# Patient Record
Sex: MALE | Race: WHITE | Employment: OTHER | URBAN - METROPOLITAN AREA
[De-identification: names, ages, dates, MRNs, and addresses within clinical notes are randomized per-mention and may not be internally consistent; named-entity substitution may affect disease eponyms.]

---

## 2017-05-12 ENCOUNTER — HOSPITAL ENCOUNTER (OUTPATIENT)
Dept: OTHER | Age: 77
Discharge: HOME | End: 2017-05-12
Attending: INTERNAL MEDICINE | Admitting: INTERNAL MEDICINE

## 2017-05-12 LAB
ANION GAP SERPL CALCULATED.3IONS-SCNC: 10 MMOL/L (ref 3–16)
BUN BLDV-MCNC: 28 MG/DL (ref 7–20)
CALCIUM SERPL-MCNC: 9.1 MG/DL (ref 8.3–10.6)
CHLORIDE BLD-SCNC: 104 MMOL/L (ref 99–110)
CHOLESTEROL, TOTAL: 94 MG/DL (ref 0–199)
CO2: 26 MMOL/L (ref 21–32)
CREAT SERPL-MCNC: 1 MG/DL (ref 0.8–1.3)
GFR AFRICAN AMERICAN: >60
GFR NON-AFRICAN AMERICAN: >60
GLUCOSE BLD-MCNC: 85 MG/DL (ref 70–99)
HDLC SERPL-MCNC: 46 MG/DL (ref 40–60)
LDL CHOLESTEROL CALCULATED: 32 MG/DL
POTASSIUM SERPL-SCNC: 4.2 MMOL/L (ref 3.5–5.1)
SODIUM BLD-SCNC: 140 MMOL/L (ref 136–145)
TRIGL SERPL-MCNC: 78 MG/DL (ref 0–150)
VLDLC SERPL CALC-MCNC: 16 MG/DL

## 2017-12-06 PROBLEM — J10.1 INFLUENZA A: Status: ACTIVE | Noted: 2017-12-06

## 2017-12-06 PROBLEM — J02.9 SORE THROAT: Status: ACTIVE | Noted: 2017-12-06

## 2017-12-06 PROBLEM — R55 SYNCOPE AND COLLAPSE: Status: ACTIVE | Noted: 2017-12-06

## 2017-12-06 PROBLEM — R00.0 TACHYCARDIA: Status: ACTIVE | Noted: 2017-12-06

## 2017-12-07 PROBLEM — A41.9 SEPSIS (HCC): Status: RESOLVED | Noted: 2017-12-06 | Resolved: 2017-12-07

## 2017-12-07 PROBLEM — R55 SYNCOPE AND COLLAPSE: Status: RESOLVED | Noted: 2017-12-06 | Resolved: 2017-12-07

## 2017-12-07 PROBLEM — R00.0 TACHYCARDIA: Status: RESOLVED | Noted: 2017-12-06 | Resolved: 2017-12-07

## 2018-01-15 PROBLEM — K85.10 ACUTE GALLSTONE PANCREATITIS: Status: ACTIVE | Noted: 2018-01-15

## 2018-01-15 PROBLEM — K83.09 ASCENDING CHOLANGITIS: Status: ACTIVE | Noted: 2018-01-15

## 2018-01-15 PROBLEM — J10.1 INFLUENZA A: Status: RESOLVED | Noted: 2017-12-06 | Resolved: 2018-01-15

## 2018-01-19 ENCOUNTER — TELEPHONE (OUTPATIENT)
Dept: SURGERY | Age: 78
End: 2018-01-19

## 2018-01-19 DIAGNOSIS — R11.0 NAUSEA: Primary | ICD-10-CM

## 2018-01-19 RX ORDER — ONDANSETRON 4 MG/1
4 TABLET, FILM COATED ORAL EVERY 4 HOURS PRN
Qty: 15 TABLET | Refills: 0 | Status: SHIPPED | OUTPATIENT
Start: 2018-01-19

## 2018-01-23 ENCOUNTER — OFFICE VISIT (OUTPATIENT)
Dept: SURGERY | Age: 78
End: 2018-01-23

## 2018-01-23 VITALS — WEIGHT: 176 LBS | DIASTOLIC BLOOD PRESSURE: 80 MMHG | SYSTOLIC BLOOD PRESSURE: 120 MMHG | BODY MASS INDEX: 24.55 KG/M2

## 2018-01-23 DIAGNOSIS — K85.90 ACUTE PANCREATITIS, UNSPECIFIED COMPLICATION STATUS, UNSPECIFIED PANCREATITIS TYPE: ICD-10-CM

## 2018-01-23 DIAGNOSIS — K85.10 ACUTE GALLSTONE PANCREATITIS: ICD-10-CM

## 2018-01-23 DIAGNOSIS — K85.90 ACUTE PANCREATITIS, UNSPECIFIED COMPLICATION STATUS, UNSPECIFIED PANCREATITIS TYPE: Primary | ICD-10-CM

## 2018-01-23 LAB
A/G RATIO: 1.1 (ref 1.1–2.2)
ALBUMIN SERPL-MCNC: 3.4 G/DL (ref 3.4–5)
ALP BLD-CCNC: 132 U/L (ref 40–129)
ALT SERPL-CCNC: 85 U/L (ref 10–40)
ANION GAP SERPL CALCULATED.3IONS-SCNC: 10 MMOL/L (ref 3–16)
AST SERPL-CCNC: 74 U/L (ref 15–37)
BASOPHILS ABSOLUTE: 0 K/UL (ref 0–0.2)
BASOPHILS RELATIVE PERCENT: 0.1 %
BILIRUB SERPL-MCNC: 1.4 MG/DL (ref 0–1)
BUN BLDV-MCNC: 28 MG/DL (ref 7–20)
CALCIUM SERPL-MCNC: 8.7 MG/DL (ref 8.3–10.6)
CHLORIDE BLD-SCNC: 104 MMOL/L (ref 99–110)
CO2: 26 MMOL/L (ref 21–32)
CREAT SERPL-MCNC: 1.1 MG/DL (ref 0.8–1.3)
EOSINOPHILS ABSOLUTE: 0.1 K/UL (ref 0–0.6)
EOSINOPHILS RELATIVE PERCENT: 0.7 %
GFR AFRICAN AMERICAN: >60
GFR NON-AFRICAN AMERICAN: >60
GLOBULIN: 3 G/DL
GLUCOSE BLD-MCNC: 111 MG/DL (ref 70–99)
HCT VFR BLD CALC: 43.3 % (ref 40.5–52.5)
HEMOGLOBIN: 14.5 G/DL (ref 13.5–17.5)
LIPASE: 218 U/L (ref 13–60)
LYMPHOCYTES ABSOLUTE: 1.3 K/UL (ref 1–5.1)
LYMPHOCYTES RELATIVE PERCENT: 16.5 %
MCH RBC QN AUTO: 31.6 PG (ref 26–34)
MCHC RBC AUTO-ENTMCNC: 33.5 G/DL (ref 31–36)
MCV RBC AUTO: 94.2 FL (ref 80–100)
MONOCYTES ABSOLUTE: 0.9 K/UL (ref 0–1.3)
MONOCYTES RELATIVE PERCENT: 11.1 %
NEUTROPHILS ABSOLUTE: 5.6 K/UL (ref 1.7–7.7)
NEUTROPHILS RELATIVE PERCENT: 71.6 %
PDW BLD-RTO: 14.1 % (ref 12.4–15.4)
PLATELET # BLD: 299 K/UL (ref 135–450)
PMV BLD AUTO: 8.2 FL (ref 5–10.5)
POTASSIUM SERPL-SCNC: 4.4 MMOL/L (ref 3.5–5.1)
RBC # BLD: 4.6 M/UL (ref 4.2–5.9)
SODIUM BLD-SCNC: 140 MMOL/L (ref 136–145)
TOTAL PROTEIN: 6.4 G/DL (ref 6.4–8.2)
WBC # BLD: 7.8 K/UL (ref 4–11)

## 2018-01-23 PROCEDURE — 99024 POSTOP FOLLOW-UP VISIT: CPT | Performed by: SURGERY

## 2018-01-23 NOTE — PROGRESS NOTES
[facility]                      PATIENT NAME: Grisel Ren     TODAY'S DATE: 1/23/2018    SUBJECTIVE:      Pt. returns to the office today following a laparoscopic cholecystectomy. He had surgery on 1/17 at [facility]. He has been recovering well to date, with progression towards normal activity and diet. He has some complaints today of some mid tenderness and nausea. OBJECTIVE:     VITALS:  /80   Wt 176 lb (79.8 kg)   BMI 24.55 kg/m²                                  CONSTITUTIONAL:  awake and alert  LUNGS:  clear to auscultation  ABDOMEN:   normal bowel sounds, soft, non-distended, non-tender   INCISIONS: clean, dry, no drainage, healing      Data:    Radiology Review:  Intraoperative cholangiogram was normal.      ASSESSMENT AND PLAN:    76 y.o. male status post LC, IOC. Pathology shows chronic cholecystitis and cholelithiasis. Liver had cholestasis from CBD stone. This was reviewed with the patient today. His recovery is progressing uneventfully, and he is released to normal activity.   Will check fu labs and CT to fu on pancreatitis, still having some panc sx's      Powell Couch

## 2018-01-30 ENCOUNTER — TELEPHONE (OUTPATIENT)
Dept: SURGERY | Age: 78
End: 2018-01-30

## 2018-01-30 NOTE — TELEPHONE ENCOUNTER
1/17 LAPAROSCOPIC CHOLECYSTECTOMY WITH CHOLANGIOGRAM/nausea and not eating much    Pt is calling stating he's lost about 15lbs total and he keeps losing. He's not in pain.      Thank You

## 2018-01-31 ENCOUNTER — HOSPITAL ENCOUNTER (OUTPATIENT)
Dept: CT IMAGING | Age: 78
Discharge: HOME | End: 2018-01-31
Attending: SURGERY | Admitting: SURGERY

## 2018-01-31 DIAGNOSIS — K85.90 ACUTE PANCREATITIS, UNSPECIFIED COMPLICATION STATUS, UNSPECIFIED PANCREATITIS TYPE: Primary | ICD-10-CM

## 2018-01-31 DIAGNOSIS — K85.90 ACUTE PANCREATITIS WITHOUT INFECTION OR NECROSIS: ICD-10-CM

## 2018-01-31 DIAGNOSIS — K85.90 ACUTE PANCREATITIS, UNSPECIFIED COMPLICATION STATUS, UNSPECIFIED PANCREATITIS TYPE: ICD-10-CM

## 2018-04-12 PROBLEM — J02.9 SORE THROAT: Status: RESOLVED | Noted: 2017-12-06 | Resolved: 2018-04-12

## 2018-07-12 ENCOUNTER — HOSPITAL ENCOUNTER (OUTPATIENT)
Dept: OTHER | Age: 78
Discharge: HOME | End: 2018-07-12
Attending: INTERNAL MEDICINE | Admitting: INTERNAL MEDICINE

## 2018-07-12 LAB
ALBUMIN SERPL-MCNC: 3.8 G/DL (ref 3.4–5)
ANION GAP SERPL CALCULATED.3IONS-SCNC: 12 MMOL/L (ref 3–16)
BUN BLDV-MCNC: 23 MG/DL (ref 7–20)
CALCIUM SERPL-MCNC: 8.8 MG/DL (ref 8.3–10.6)
CHLORIDE BLD-SCNC: 107 MMOL/L (ref 99–110)
CHOLESTEROL, TOTAL: 89 MG/DL (ref 0–199)
CO2: 24 MMOL/L (ref 21–32)
CREAT SERPL-MCNC: 1.2 MG/DL (ref 0.8–1.3)
GFR AFRICAN AMERICAN: >60
GFR NON-AFRICAN AMERICAN: 58
GLUCOSE BLD-MCNC: 91 MG/DL (ref 70–99)
HCT VFR BLD CALC: 45.9 % (ref 40.5–52.5)
HDLC SERPL-MCNC: 49 MG/DL (ref 40–60)
HEMOGLOBIN: 15.8 G/DL (ref 13.5–17.5)
LDL CHOLESTEROL CALCULATED: 24 MG/DL
MCH RBC QN AUTO: 31.4 PG (ref 26–34)
MCHC RBC AUTO-ENTMCNC: 34.3 G/DL (ref 31–36)
MCV RBC AUTO: 91.4 FL (ref 80–100)
PDW BLD-RTO: 13.9 % (ref 12.4–15.4)
PHOSPHORUS: 2.9 MG/DL (ref 2.5–4.9)
PLATELET # BLD: 164 K/UL (ref 135–450)
PMV BLD AUTO: 8.1 FL (ref 5–10.5)
POTASSIUM SERPL-SCNC: 4.2 MMOL/L (ref 3.5–5.1)
RBC # BLD: 5.02 M/UL (ref 4.2–5.9)
SODIUM BLD-SCNC: 143 MMOL/L (ref 136–145)
TRIGL SERPL-MCNC: 81 MG/DL (ref 0–150)
VLDLC SERPL CALC-MCNC: 16 MG/DL
WBC # BLD: 6.2 K/UL (ref 4–11)

## 2018-08-24 ENCOUNTER — HOSPITAL ENCOUNTER (OUTPATIENT)
Dept: OTHER | Age: 78
Discharge: HOME | End: 2018-08-24
Attending: INTERNAL MEDICINE | Admitting: INTERNAL MEDICINE

## 2018-08-24 LAB
ALBUMIN SERPL-MCNC: 4 G/DL (ref 3.4–5)
ANION GAP SERPL CALCULATED.3IONS-SCNC: 9 MMOL/L (ref 3–16)
BUN BLDV-MCNC: 26 MG/DL (ref 7–20)
CALCIUM SERPL-MCNC: 9.1 MG/DL (ref 8.3–10.6)
CHLORIDE BLD-SCNC: 108 MMOL/L (ref 99–110)
CO2: 27 MMOL/L (ref 21–32)
CREAT SERPL-MCNC: 1.2 MG/DL (ref 0.8–1.3)
GFR AFRICAN AMERICAN: >60
GFR NON-AFRICAN AMERICAN: 58
GLUCOSE BLD-MCNC: 130 MG/DL (ref 70–99)
PHOSPHORUS: 2.8 MG/DL (ref 2.5–4.9)
POTASSIUM SERPL-SCNC: 4.4 MMOL/L (ref 3.5–5.1)
SODIUM BLD-SCNC: 144 MMOL/L (ref 136–145)

## 2019-10-29 ENCOUNTER — HOSPITAL ENCOUNTER (OUTPATIENT)
Age: 79
Discharge: HOME | End: 2019-10-29
Payer: MEDICARE

## 2019-10-29 LAB
ALBUMIN SERPL-MCNC: 4.2 G/DL (ref 3.4–5)
ANION GAP SERPL CALCULATED.3IONS-SCNC: 11 MMOL/L (ref 3–16)
BUN BLDV-MCNC: 20 MG/DL (ref 7–20)
CALCIUM SERPL-MCNC: 9.1 MG/DL (ref 8.3–10.6)
CHLORIDE BLD-SCNC: 108 MMOL/L (ref 99–110)
CHOLESTEROL, TOTAL: 80 MG/DL (ref 0–199)
CO2: 23 MMOL/L (ref 21–32)
CREAT SERPL-MCNC: 1.3 MG/DL (ref 0.8–1.3)
GFR AFRICAN AMERICAN: >60
GFR NON-AFRICAN AMERICAN: 53
GLUCOSE BLD-MCNC: 95 MG/DL (ref 70–99)
HCT VFR BLD CALC: 46 % (ref 40.5–52.5)
HDLC SERPL-MCNC: 43 MG/DL (ref 40–60)
HEMOGLOBIN: 15.3 G/DL (ref 13.5–17.5)
LDL CHOLESTEROL CALCULATED: 22 MG/DL
MCH RBC QN AUTO: 31 PG (ref 26–34)
MCHC RBC AUTO-ENTMCNC: 33.2 G/DL (ref 31–36)
MCV RBC AUTO: 93.3 FL (ref 80–100)
PDW BLD-RTO: 13.5 % (ref 12.4–15.4)
PHOSPHORUS: 2.9 MG/DL (ref 2.5–4.9)
PLATELET # BLD: 178 K/UL (ref 135–450)
PMV BLD AUTO: 8.3 FL (ref 5–10.5)
POTASSIUM SERPL-SCNC: 4.6 MMOL/L (ref 3.5–5.1)
RBC # BLD: 4.93 M/UL (ref 4.2–5.9)
SODIUM BLD-SCNC: 142 MMOL/L (ref 136–145)
TRIGL SERPL-MCNC: 76 MG/DL (ref 0–150)
VLDLC SERPL CALC-MCNC: 15 MG/DL
WBC # BLD: 5.8 K/UL (ref 4–11)

## 2019-10-29 PROCEDURE — 80061 LIPID PANEL: CPT

## 2019-10-29 PROCEDURE — 36415 COLL VENOUS BLD VENIPUNCTURE: CPT

## 2019-10-29 PROCEDURE — 80069 RENAL FUNCTION PANEL: CPT

## 2019-10-29 PROCEDURE — 85027 COMPLETE CBC AUTOMATED: CPT

## 2020-10-01 ENCOUNTER — HOSPITAL ENCOUNTER (OUTPATIENT)
Age: 80
Discharge: HOME | End: 2020-10-01
Payer: MEDICARE

## 2020-10-01 LAB
ANION GAP SERPL CALCULATED.3IONS-SCNC: 10 MMOL/L (ref 3–16)
BUN BLDV-MCNC: 24 MG/DL (ref 7–20)
CALCIUM SERPL-MCNC: 9.2 MG/DL (ref 8.3–10.6)
CHLORIDE BLD-SCNC: 107 MMOL/L (ref 99–110)
CHOLESTEROL, TOTAL: 85 MG/DL (ref 0–199)
CO2: 25 MMOL/L (ref 21–32)
CREAT SERPL-MCNC: 1.2 MG/DL (ref 0.8–1.3)
GFR AFRICAN AMERICAN: >60
GFR NON-AFRICAN AMERICAN: 58
GLUCOSE BLD-MCNC: 101 MG/DL (ref 70–99)
HCT VFR BLD CALC: 47.6 % (ref 40.5–52.5)
HDLC SERPL-MCNC: 41 MG/DL (ref 40–60)
HEMOGLOBIN: 16.3 G/DL (ref 13.5–17.5)
LDL CHOLESTEROL CALCULATED: 25 MG/DL
MCH RBC QN AUTO: 31.3 PG (ref 26–34)
MCHC RBC AUTO-ENTMCNC: 34.3 G/DL (ref 31–36)
MCV RBC AUTO: 91 FL (ref 80–100)
PDW BLD-RTO: 13.4 % (ref 12.4–15.4)
PLATELET # BLD: 188 K/UL (ref 135–450)
PMV BLD AUTO: 7.9 FL (ref 5–10.5)
POTASSIUM SERPL-SCNC: 4.5 MMOL/L (ref 3.5–5.1)
RBC # BLD: 5.23 M/UL (ref 4.2–5.9)
SODIUM BLD-SCNC: 142 MMOL/L (ref 136–145)
TRIGL SERPL-MCNC: 94 MG/DL (ref 0–150)
VLDLC SERPL CALC-MCNC: 19 MG/DL
WBC # BLD: 6.1 K/UL (ref 4–11)

## 2020-10-01 PROCEDURE — 80061 LIPID PANEL: CPT

## 2020-10-01 PROCEDURE — 36415 COLL VENOUS BLD VENIPUNCTURE: CPT

## 2020-10-01 PROCEDURE — 85027 COMPLETE CBC AUTOMATED: CPT

## 2020-10-01 PROCEDURE — 80048 BASIC METABOLIC PNL TOTAL CA: CPT

## 2021-04-28 ENCOUNTER — APPOINTMENT (OUTPATIENT)
Dept: CT IMAGING | Age: 81
End: 2021-04-28
Payer: MEDICARE

## 2021-04-28 ENCOUNTER — APPOINTMENT (OUTPATIENT)
Dept: GENERAL RADIOLOGY | Age: 81
End: 2021-04-28
Payer: MEDICARE

## 2021-04-28 ENCOUNTER — HOSPITAL ENCOUNTER (EMERGENCY)
Age: 81
Discharge: HOME | End: 2021-04-28
Attending: EMERGENCY MEDICINE
Payer: MEDICARE

## 2021-04-28 VITALS
TEMPERATURE: 98.4 F | SYSTOLIC BLOOD PRESSURE: 134 MMHG | RESPIRATION RATE: 18 BRPM | HEART RATE: 66 BPM | BODY MASS INDEX: 23.62 KG/M2 | WEIGHT: 165 LBS | OXYGEN SATURATION: 99 % | DIASTOLIC BLOOD PRESSURE: 74 MMHG | HEIGHT: 70 IN

## 2021-04-28 DIAGNOSIS — K76.89 HEPATIC CYST: ICD-10-CM

## 2021-04-28 DIAGNOSIS — R10.13 ABDOMINAL PAIN, EPIGASTRIC: Primary | ICD-10-CM

## 2021-04-28 LAB
A/G RATIO: 1.1 (ref 1.1–2.2)
ALBUMIN SERPL-MCNC: 3.9 G/DL (ref 3.4–5)
ALP BLD-CCNC: 73 U/L (ref 40–129)
ALT SERPL-CCNC: 18 U/L (ref 10–40)
ANION GAP SERPL CALCULATED.3IONS-SCNC: 11 MMOL/L (ref 3–16)
AST SERPL-CCNC: 22 U/L (ref 15–37)
BASOPHILS ABSOLUTE: 0 K/UL (ref 0–0.2)
BASOPHILS RELATIVE PERCENT: 0.2 %
BILIRUB SERPL-MCNC: 0.5 MG/DL (ref 0–1)
BILIRUBIN URINE: ABNORMAL
BLOOD, URINE: NEGATIVE
BUN BLDV-MCNC: 23 MG/DL (ref 7–20)
CALCIUM SERPL-MCNC: 9.3 MG/DL (ref 8.3–10.6)
CHLORIDE BLD-SCNC: 110 MMOL/L (ref 99–110)
CLARITY: ABNORMAL
CO2: 21 MMOL/L (ref 21–32)
COLOR: YELLOW
CREAT SERPL-MCNC: 1.2 MG/DL (ref 0.8–1.3)
EOSINOPHILS ABSOLUTE: 0 K/UL (ref 0–0.6)
EOSINOPHILS RELATIVE PERCENT: 0.5 %
EPITHELIAL CELLS, UA: 1 /HPF (ref 0–5)
GFR AFRICAN AMERICAN: >60
GFR NON-AFRICAN AMERICAN: 58
GLOBULIN: 3.4 G/DL
GLUCOSE BLD-MCNC: 158 MG/DL (ref 70–99)
GLUCOSE URINE: 250 MG/DL
HCT VFR BLD CALC: 44.6 % (ref 40.5–52.5)
HEMOGLOBIN: 14.3 G/DL (ref 13.5–17.5)
HYALINE CASTS: 4 /LPF (ref 0–8)
KETONES, URINE: ABNORMAL MG/DL
LEUKOCYTE ESTERASE, URINE: ABNORMAL
LIPASE: 41 U/L (ref 13–60)
LYMPHOCYTES ABSOLUTE: 1.5 K/UL (ref 1–5.1)
LYMPHOCYTES RELATIVE PERCENT: 17.9 %
MCH RBC QN AUTO: 28.6 PG (ref 26–34)
MCHC RBC AUTO-ENTMCNC: 32.1 G/DL (ref 31–36)
MCV RBC AUTO: 89.1 FL (ref 80–100)
MICROSCOPIC EXAMINATION: YES
MONOCYTES ABSOLUTE: 0.9 K/UL (ref 0–1.3)
MONOCYTES RELATIVE PERCENT: 11.4 %
NEUTROPHILS ABSOLUTE: 5.8 K/UL (ref 1.7–7.7)
NEUTROPHILS RELATIVE PERCENT: 70 %
NITRITE, URINE: NEGATIVE
PDW BLD-RTO: 13.9 % (ref 12.4–15.4)
PH UA: 5.5 (ref 5–8)
PLATELET # BLD: 195 K/UL (ref 135–450)
PMV BLD AUTO: 7.6 FL (ref 5–10.5)
POTASSIUM SERPL-SCNC: 3.6 MMOL/L (ref 3.5–5.1)
PROTEIN UA: NEGATIVE MG/DL
RBC # BLD: 5.01 M/UL (ref 4.2–5.9)
RBC UA: 3 /HPF (ref 0–4)
SODIUM BLD-SCNC: 142 MMOL/L (ref 136–145)
SPECIFIC GRAVITY UA: 1.02 (ref 1–1.03)
TOTAL PROTEIN: 7.3 G/DL (ref 6.4–8.2)
TROPONIN: <0.01 NG/ML
URINE REFLEX TO CULTURE: ABNORMAL
URINE TYPE: ABNORMAL
UROBILINOGEN, URINE: 0.2 E.U./DL
WBC # BLD: 8.3 K/UL (ref 4–11)
WBC UA: 5 /HPF (ref 0–5)

## 2021-04-28 PROCEDURE — 74176 CT ABD & PELVIS W/O CONTRAST: CPT

## 2021-04-28 PROCEDURE — 99283 EMERGENCY DEPT VISIT LOW MDM: CPT

## 2021-04-28 PROCEDURE — 93005 ELECTROCARDIOGRAM TRACING: CPT | Performed by: EMERGENCY MEDICINE

## 2021-04-28 PROCEDURE — 85025 COMPLETE CBC W/AUTO DIFF WBC: CPT

## 2021-04-28 PROCEDURE — 6370000000 HC RX 637 (ALT 250 FOR IP): Performed by: PHYSICIAN ASSISTANT

## 2021-04-28 PROCEDURE — 81001 URINALYSIS AUTO W/SCOPE: CPT

## 2021-04-28 PROCEDURE — 83690 ASSAY OF LIPASE: CPT

## 2021-04-28 PROCEDURE — 84484 ASSAY OF TROPONIN QUANT: CPT

## 2021-04-28 PROCEDURE — 71045 X-RAY EXAM CHEST 1 VIEW: CPT

## 2021-04-28 PROCEDURE — 80053 COMPREHEN METABOLIC PANEL: CPT

## 2021-04-28 RX ORDER — LORATADINE 10 MG/1
10 TABLET ORAL PRN
COMMUNITY

## 2021-04-28 RX ORDER — FAMOTIDINE 20 MG/1
20 TABLET, FILM COATED ORAL ONCE
Status: COMPLETED | OUTPATIENT
Start: 2021-04-28 | End: 2021-04-28

## 2021-04-28 RX ADMIN — FAMOTIDINE 20 MG: 20 TABLET, FILM COATED ORAL at 15:33

## 2021-04-28 RX ADMIN — ALUMINUM HYDROXIDE, MAGNESIUM HYDROXIDE, AND SIMETHICONE: 200; 200; 20 SUSPENSION ORAL at 15:33

## 2021-04-28 ASSESSMENT — ENCOUNTER SYMPTOMS
WHEEZING: 0
RHINORRHEA: 0
DIARRHEA: 0
COUGH: 0
ABDOMINAL PAIN: 1
NAUSEA: 0
SHORTNESS OF BREATH: 0
VOMITING: 0

## 2021-04-28 ASSESSMENT — PAIN SCALES - GENERAL: PAINLEVEL_OUTOF10: 1

## 2021-04-28 NOTE — ED PROVIDER NOTES
[address] [facility]        Pt Name: Neville Dow  MRN: [id number]  [place] 1940  Date of evaluation: 4/28/2021  Provider: Dru Jacome PA-C  PCP: Vinayak Rhoades MD     I have seen and evaluated this patient with my supervising physician Dr. Jonathan Hanson. CHIEF COMPLAINT       Chief Complaint   Patient presents with    Abdominal Pain     pt with c/o abd pain began last night - no n/v/d       HISTORY OF PRESENT ILLNESS   (Location, Timing/Onset, Context/Setting, Quality, Duration, Modifying Factors, Severity, Associated Signs and Symptoms)  Note limiting factors. Neville Dow is a 78 y.o. male who presents for evaluation of epigastric and periumbilical abdominal pain that started last night while eating dinner. States he was eating spaghetti, hot dog and eating fruit at the time. States he had to leave dinner several times because he felt like he needed to pass gas or have a bowel movement but was not able to. States that he is feeling mostly better. Never had nausea or vomiting. No diarrhea or urinary complaints, however, states that he talked to his PCP who wanted him to come to the ER for cardiac evaluation. Patient denies any chest pain or shortness of breath. There is no other radiation of the pain. No dizziness/lightheadedness, weakness, visual disturbance or syncope. He does have history of reflux and takes Prilosec for this. He has no other complaints or concerns at this time. Nursing Notes were all reviewed and agreed with or any disagreements were addressed in the HPI. REVIEW OF SYSTEMS    (2-9 systems for level 4, 10 or more for level 5)     Review of Systems   Constitutional: Negative for appetite change, chills and fever. HENT: Negative for congestion and rhinorrhea. Respiratory: Negative for cough, shortness of breath and wheezing. Cardiovascular: Negative for chest pain.    Gastrointestinal: Positive for abdominal pain. Negative for diarrhea, nausea and vomiting. Genitourinary: Negative for difficulty urinating, dysuria and hematuria. Musculoskeletal: Negative for neck pain and neck stiffness. Skin: Negative for rash. Neurological: Negative for headaches. Positives and Pertinent negatives as per HPI. Except as noted above in the ROS, all other systems were reviewed and negative.        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    Esophageal reflux 9/6/2007    Essential hypertension 12/7/2007    Overview:  ICD-10 Transition    Glaucoma     left eye    Influenza 12/06/2017    Insomnia 9/24/2008    Other and unspecified hyperlipidemia 9/24/2008         SURGICAL HISTORY     Past Surgical History:   Procedure Laterality Date    CHOLECYSTECTOMY, LAPAROSCOPIC  01/17/2018    LAPAROSCOPIC CHOLECYSTECTOMY WITH CHOLANGIOGRAM    COLONOSCOPY      total of 3, first one polyps, 2nd and 3rd no polyps    [place] [facility]      Dr Lita Apley ERCP  01/16/2018    EYE SURGERY      bilateral cataracts removed, \"floaters\"    HERNIA REPAIR      bilateral inguinal hernia repairs         CURRENTMEDICATIONS       Discharge Medication List as of 4/28/2021  6:14 PM      CONTINUE these medications which have NOT CHANGED    Details   loratadine (CLARITIN) 10 MG tablet Take 10 mg by mouth as neededHistorical Med      pantoprazole (PROTONIX) 20 MG tablet Take 1 tablet by mouth daily, Disp-30 tablet, R-0Normal      aspirin 81 MG tablet Take 81 mg by mouth dailyHistorical Med      atenolol (TENORMIN) 25 MG tablet Take 50 mg by mouth daily Historical Med      ALPHAGAN P 0.1 % SOLN Place 1 drop into the left eye 3 times daily , DAWHistorical Med dorzolamide (TRUSOPT) 2 % ophthalmic solution Place 1 drop into the right eye 3 times daily Historical Med      lisinopril (PRINIVIL;ZESTRIL) 5 MG tablet Take 5 mg by mouth daily Historical Med      simvastatin (ZOCOR) 40 MG tablet Take 40 mg by mouth nightly Historical Med      zolpidem (AMBIEN) 5 MG tablet TAKE ONE TABLET BY MOUTH EVERY NIGHT AT BEDTIME AS NEEDEDHistorical Med      ondansetron (ZOFRAN) 4 MG tablet Take 1 tablet by mouth every 4 hours as needed for Nausea or Vomiting, Disp-15 tablet, R-0Normal      acetaminophen (TYLENOL) 325 MG tablet Take 2 tablets by mouth every 4 hours as needed for Pain or Fever, Disp-120 tablet, R-3Normal               ALLERGIES     Patient has no known allergies. FAMILYHISTORY       Family History   Problem Relation Age of Onset    Cancer Mother     Dementia Mother     Stroke Father           SOCIAL HISTORY       Social History     Tobacco Use    Smoking status: Former Smoker     Packs/day: 1.00     Years: 35.00     Pack years: 35.00     Types: Cigarettes     Quit date: 1988     Years since quittin.6    Smokeless tobacco: Never Used   Substance Use Topics    Alcohol use: Yes     Comment: beer occasionally    Drug use: No       SCREENINGS             PHYSICAL EXAM    (up to 7 for level 4, 8 or more for level 5)     ED Triage Vitals   BP Temp Temp src Pulse Resp SpO2 Height Weight   -- -- -- -- -- -- -- --       Physical Exam  Vitals signs and nursing note reviewed. Constitutional:       General: He is not in acute distress. Appearance: He is well-developed. He is not ill-appearing, toxic-appearing or diaphoretic. HENT:      Head: Normocephalic and atraumatic. Right Ear: External ear normal.      Left Ear: External ear normal.      Nose: Nose normal.   Eyes:      General:         Right eye: No discharge. Left eye: No discharge. Neck:      Musculoskeletal: Normal range of motion and neck supple.    Cardiovascular:      Rate and Rhythm: Normal rate and regular rhythm. Heart sounds: Normal heart sounds. Pulmonary:      Effort: Pulmonary effort is normal. No respiratory distress. Breath sounds: Normal breath sounds. Chest:      Chest wall: No tenderness. Abdominal:      General: Abdomen is flat. Bowel sounds are normal. There is no distension. Palpations: Abdomen is soft. There is no mass. Tenderness: There is no abdominal tenderness. There is no guarding or rebound. Hernia: No hernia is present. Musculoskeletal: Normal range of motion. Skin:     General: Skin is warm and dry. Neurological:      Mental Status: He is alert and oriented to person, place, and time.    Psychiatric:         Behavior: Behavior normal.         DIAGNOSTIC RESULTS   LABS:    Labs Reviewed   COMPREHENSIVE METABOLIC PANEL - Abnormal; Notable for the following components:       Result Value    Glucose 158 (*)     BUN 23 (*)     GFR Non- 58 (*)     All other components within normal limits    Narrative:     Performed at:  [facility] [address], [place]   Phone [phone]   URINE RT REFLEX TO CULTURE - Abnormal; Notable for the following components:    Clarity, UA CLOUDY (*)     Glucose, Ur 250 (*)     Bilirubin Urine SMALL (*)     Ketones, Urine TRACE (*)     Leukocyte Esterase, Urine TRACE (*)     All other components within normal limits    Narrative:     Performed at:  [facility] [address], [place]   Phone [phone]   CBC WITH AUTO DIFFERENTIAL    Narrative:     Performed at:  [facility] [address], [place]   Phone [phone]   LIPASE    Narrative:     Performed at:  [facility] [address],  [place], [place]   Phone [phone]   TROPONIN    Narrative:     Performed at:  [facility]  [address], [place]   Phone [phone]   MICROSCOPIC URINALYSIS    Narrative:     Performed at:  [facility]  [address], [place]   Phone [phone]       All other labs were within normal range or not returned as of this dictation. EKG: All EKG's are interpreted by the Emergency Department Physician in the absence of a cardiologist.  Please see their note for interpretation of EKG. RADIOLOGY:   Non-plain film images such as CT, Ultrasound and MRI are read by the radiologist. Plain radiographic images are visualized and preliminarily interpreted by the ED Provider with the below findings:        Interpretation per the Radiologist below, if available at the time of this note:    CT ABDOMEN PELVIS WO CONTRAST Additional Contrast? None   Final Result   1. Sliding hiatal hernia. 2. Mild emphysema and coronary artery calcifications noted at lung bases. 3. Hepatic cysts largest 3.5 cm in the right lobe. 4. No acute infective or inflammatory process. 5. No urinary tract calculi. XR CHEST PORTABLE   Final Result   No acute cardiac or pulmonary disease. No results found. PROCEDURES   Unless otherwise noted below, none     Procedures    CRITICAL CARE TIME   N/A    CONSULTS:  None      EMERGENCY DEPARTMENT COURSE and DIFFERENTIAL DIAGNOSIS/MDM:   Vitals:    Vitals:    04/28/21 1514   BP: 134/74   Pulse: 66   Resp: 18   Temp: 98.4 °F (36.9 °C)   TempSrc: Oral   SpO2: 99%   Weight: 165 lb (74.8 kg)   Height: 5' 10\" (1.778 m)       Patient was given the following medications:  Medications   aluminum & magnesium hydroxide-simethicone (MAALOX) 30 mL, lidocaine viscous hcl (XYLOCAINE) 5 mL (GI COCKTAIL) ( Oral Given 4/28/21 1533)   famotidine (PEPCID) tablet 20 mg (20 mg Oral Given 4/28/21 1533)           Patient presents for evaluation of epigastric abdominal pain.   On exam, he is resting comfortably in bed no acute distress nontoxic. Vitals are stable and he is afebrile. Lungs are clear to auscultation bilaterally, chest is nontender and abdomen is benign without focal reproducible tenderness or peritoneal signs. He is not distended or rigid. He was given GI cocktail and Pepcid for symptomatic relief and will be reevaluated. Please see attending note for EKG interpretation. CBC and CMP are unremarkable. Lipase 41. Troponin is negative. Urinalysis negative. Chest x-ray shows no acute process. CT abdomen pelvis shows mild emphysema and a sliding hiatal hernia. There is no infective or inflammatory process. Incidental finding of hepatic cyst in the right lobe. Patient improvement of symptoms on reevaluation. The patient has been evaluated and the history and physical exam suggest a benign etiology. I see nothing to suggest acute coronary syndrome, myocardial infarction, pulmonary embolism, thoracic aortic dissection, significant pericarditis, pneumonia, pneumothorax, or acute abdomen. I see nothing that would suggest an acute abdomen at this time. Based on history, physical exam, risk factors, and tests my suspicion for bowel obstruction, incarcerated hernia, acute pancreatitis, intra-abdominal abscess, perforated viscus, diverticulitis, cholecystitis, appendicitis, testicular torsion and cardiac ischemia is very low. There is no evidence of peritonitis, sepsis or toxicity at this time. I feel the patient can be managed as an outpatient with follow-up with his family doctor in 24-48 hours. Instructions have been given for the patient to return to the emergency department for worsening of the pain, high fevers, intractable vomiting, bleeding or any other concerns. He is agreeable to this plan and stable for discharge at this time. FINAL IMPRESSION      1. Abdominal pain, epigastric    2.  Hepatic cyst          DISPOSITION/PLAN   DISPOSITION Decision To Discharge 04/28/2021 06:05:06 PM      PATIENT REFERREDTO:  Alicja Corral MD  [address] [facility]    Call   For a re-check in  2-3  days    [facility] Emergency Department  [address]  Go to   If symptoms worsen      DISCHARGE MEDICATIONS:  Discharge Medication List as of 4/28/2021  6:14 PM          DISCONTINUED MEDICATIONS:  Discharge Medication List as of 4/28/2021  6:14 PM                 (Please note that portions of this note were completed with a voice recognition program.  Efforts were made to edit the dictations but occasionally words are mis-transcribed.)    Jordin Ignacio PA-C (electronically signed)           Lu Che [place]  04/28/21 [id number]

## 2021-04-28 NOTE — ED PROVIDER NOTES
This patient was seen by the Mid-Level Provider. I have seen and examined the patient, agree with the workup, evaluation, management and diagnosis. Care plan has been discussed. My assessment reveals an 64-year-old male who presents with abdominal pain. This is an 64-year-old male who presents with some abdominal pain he had last evening. The patient was apparently sent in by his primary care physician to make sure this was not cardiac related. He denies any chest pain or having any. Radiology results:    CT ABDOMEN PELVIS WO CONTRAST Additional Contrast? None   Final Result   1. Sliding hiatal hernia. 2. Mild emphysema and coronary artery calcifications noted at lung bases. 3. Hepatic cysts largest 3.5 cm in the right lobe. 4. No acute infective or inflammatory process. 5. No urinary tract calculi. XR CHEST PORTABLE   Final Result   No acute cardiac or pulmonary disease.                LABS:    Labs Reviewed   COMPREHENSIVE METABOLIC PANEL - Abnormal; Notable for the following components:       Result Value    Glucose 158 (*)     BUN 23 (*)     GFR Non- 58 (*)     All other components within normal limits    Narrative:     Performed at:  [facility] [address] [place]   Phone [phone]   URINE RT REFLEX TO CULTURE - Abnormal; Notable for the following components:    Clarity, UA CLOUDY (*)     Glucose, Ur 250 (*)     Bilirubin Urine SMALL (*)     Ketones, Urine TRACE (*)     Leukocyte Esterase, Urine TRACE (*)     All other components within normal limits    Narrative:     Performed at:  [facility] [address], [place]   Phone [phone]   CBC WITH AUTO DIFFERENTIAL    Narrative:     Performed at:  [facility] [address] [employer]   Phone [phone]   LIPASE    Narrative:     Performed at:  [facility]  [address]  [place], [place]   Phone [phone]   TROPONIN    Narrative:     Performed at:  [facility]  [address],  [place], [address]   Phone [phone]   MICROSCOPIC URINALYSIS    Narrative:     Performed at:  [facility]  [address]  [place], [address] [employer] [address]   Phone [phone]           EKG:    Sinus rhythm at a rate of 63 beats a minute with no acute ST elevations or depressions or pathologic Q waves. Normal axis. Exam:    Well-nourished male in no acute distress. He was sitting up in bed comfortably. He had no pain to palpation of his abdomen. Medical decision makin-year-old male presents with some abdominal pain last evening. The patient's work-up was unremarkable normal including a CT that shows some incidental findings and a cardiac work-up that was negative. The patient will be discharged with referral back to his primary physician and instructed to return if worse. FINAL IMPRESSION:    1.  Abdominal pain, epigastric           Yvette Kemp MD  [id number]

## 2021-04-29 LAB
EKG ATRIAL RATE: 63 BPM
EKG DIAGNOSIS: NORMAL
EKG P AXIS: 95 DEGREES
EKG P-R INTERVAL: 208 MS
EKG Q-T INTERVAL: 432 MS
EKG QRS DURATION: 80 MS
EKG QTC CALCULATION (BAZETT): 442 MS
EKG R AXIS: 63 DEGREES
EKG T AXIS: 47 DEGREES
EKG VENTRICULAR RATE: 63 BPM

## 2021-04-29 PROCEDURE — 93010 ELECTROCARDIOGRAM REPORT: CPT | Performed by: INTERNAL MEDICINE

## 2022-02-01 ENCOUNTER — APPOINTMENT (OUTPATIENT)
Dept: CT IMAGING | Age: 82
DRG: 378 | End: 2022-02-01
Payer: MEDICARE

## 2022-02-01 ENCOUNTER — ANESTHESIA EVENT (OUTPATIENT)
Dept: ENDOSCOPY | Age: 82
DRG: 378 | End: 2022-02-01
Payer: MEDICARE

## 2022-02-01 ENCOUNTER — APPOINTMENT (OUTPATIENT)
Dept: GENERAL RADIOLOGY | Age: 82
DRG: 378 | End: 2022-02-01
Payer: MEDICARE

## 2022-02-01 ENCOUNTER — HOSPITAL ENCOUNTER (INPATIENT)
Age: 82
LOS: 3 days | Discharge: HOME | DRG: 378 | End: 2022-02-04
Attending: INTERNAL MEDICINE | Admitting: INTERNAL MEDICINE
Payer: MEDICARE

## 2022-02-01 DIAGNOSIS — K76.9 LIVER LESION: ICD-10-CM

## 2022-02-01 DIAGNOSIS — R91.8 MULTIPLE LUNG NODULES ON CT: ICD-10-CM

## 2022-02-01 DIAGNOSIS — R59.0 MEDIASTINAL LYMPHADENOPATHY: ICD-10-CM

## 2022-02-01 DIAGNOSIS — R53.83 FATIGUE, UNSPECIFIED TYPE: ICD-10-CM

## 2022-02-01 DIAGNOSIS — R77.8 ELEVATED TROPONIN: ICD-10-CM

## 2022-02-01 DIAGNOSIS — D50.0 BLOOD LOSS ANEMIA: Primary | ICD-10-CM

## 2022-02-01 PROBLEM — K92.2 ACUTE GI BLEEDING: Status: ACTIVE | Noted: 2022-02-01

## 2022-02-01 LAB
ABO/RH: NORMAL
ALBUMIN SERPL-MCNC: 3.3 G/DL (ref 3.4–5)
ALP BLD-CCNC: 91 U/L (ref 40–129)
ALT SERPL-CCNC: 11 U/L (ref 10–40)
ANION GAP SERPL CALCULATED.3IONS-SCNC: 10 MMOL/L (ref 3–16)
ANTIBODY SCREEN: NORMAL
APTT: 28.2 SEC (ref 26.2–38.6)
AST SERPL-CCNC: 17 U/L (ref 15–37)
BASOPHILS ABSOLUTE: 0 K/UL (ref 0–0.2)
BASOPHILS RELATIVE PERCENT: 0.4 %
BILIRUB SERPL-MCNC: 0.6 MG/DL (ref 0–1)
BILIRUBIN DIRECT: 0.3 MG/DL (ref 0–0.3)
BILIRUBIN, INDIRECT: 0.3 MG/DL (ref 0–1)
BLOOD BANK DISPENSE STATUS: NORMAL
BLOOD BANK PRODUCT CODE: NORMAL
BPU ID: NORMAL
BUN BLDV-MCNC: 35 MG/DL (ref 7–20)
CALCIUM SERPL-MCNC: 8.2 MG/DL (ref 8.3–10.6)
CHLORIDE BLD-SCNC: 111 MMOL/L (ref 99–110)
CO2: 18 MMOL/L (ref 21–32)
CREAT SERPL-MCNC: 0.9 MG/DL (ref 0.8–1.3)
DESCRIPTION BLOOD BANK: NORMAL
EKG ATRIAL RATE: 72 BPM
EKG DIAGNOSIS: NORMAL
EKG P AXIS: 101 DEGREES
EKG P-R INTERVAL: 222 MS
EKG Q-T INTERVAL: 438 MS
EKG QRS DURATION: 78 MS
EKG QTC CALCULATION (BAZETT): 479 MS
EKG R AXIS: 47 DEGREES
EKG T AXIS: 12 DEGREES
EKG VENTRICULAR RATE: 72 BPM
EOSINOPHILS ABSOLUTE: 0.1 K/UL (ref 0–0.6)
EOSINOPHILS RELATIVE PERCENT: 2.2 %
FERRITIN: 29.2 NG/ML (ref 30–400)
FOLATE: 13.69 NG/ML (ref 4.78–24.2)
GFR AFRICAN AMERICAN: >60
GFR NON-AFRICAN AMERICAN: >60
GLUCOSE BLD-MCNC: 111 MG/DL (ref 70–99)
HCT VFR BLD CALC: 21.2 % (ref 40.5–52.5)
HCT VFR BLD CALC: 23.7 % (ref 40.5–52.5)
HEMOGLOBIN: 6.5 G/DL (ref 13.5–17.5)
HEMOGLOBIN: 7.3 G/DL (ref 13.5–17.5)
INR BLD: 1.16 (ref 0.88–1.12)
IRON SATURATION: 10 % (ref 20–50)
IRON: 27 UG/DL (ref 59–158)
LYMPHOCYTES ABSOLUTE: 0.5 K/UL (ref 1–5.1)
LYMPHOCYTES RELATIVE PERCENT: 10.4 %
MCH RBC QN AUTO: 27.1 PG (ref 26–34)
MCHC RBC AUTO-ENTMCNC: 30.7 G/DL (ref 31–36)
MCV RBC AUTO: 88.2 FL (ref 80–100)
MONOCYTES ABSOLUTE: 0.5 K/UL (ref 0–1.3)
MONOCYTES RELATIVE PERCENT: 11 %
NEUTROPHILS ABSOLUTE: 3.7 K/UL (ref 1.7–7.7)
NEUTROPHILS RELATIVE PERCENT: 76 %
OCCULT BLOOD DIAGNOSTIC: ABNORMAL
PDW BLD-RTO: 18.2 % (ref 12.4–15.4)
PLATELET # BLD: 277 K/UL (ref 135–450)
PMV BLD AUTO: 7 FL (ref 5–10.5)
POTASSIUM REFLEX MAGNESIUM: 3.9 MMOL/L (ref 3.5–5.1)
PROTHROMBIN TIME: 13.2 SEC (ref 9.9–12.7)
RBC # BLD: 2.4 M/UL (ref 4.2–5.9)
SARS-COV-2, NAAT: NOT DETECTED
SODIUM BLD-SCNC: 139 MMOL/L (ref 136–145)
TOTAL IRON BINDING CAPACITY: 267 UG/DL (ref 260–445)
TOTAL PROTEIN: 5.6 G/DL (ref 6.4–8.2)
TROPONIN: 0.03 NG/ML
VITAMIN B-12: 456 PG/ML (ref 211–911)
WBC # BLD: 4.9 K/UL (ref 4–11)

## 2022-02-01 PROCEDURE — 71045 X-RAY EXAM CHEST 1 VIEW: CPT

## 2022-02-01 PROCEDURE — 71250 CT THORAX DX C-: CPT

## 2022-02-01 PROCEDURE — 85730 THROMBOPLASTIN TIME PARTIAL: CPT

## 2022-02-01 PROCEDURE — 83550 IRON BINDING TEST: CPT

## 2022-02-01 PROCEDURE — 86850 RBC ANTIBODY SCREEN: CPT

## 2022-02-01 PROCEDURE — 6370000000 HC RX 637 (ALT 250 FOR IP): Performed by: PHYSICIAN ASSISTANT

## 2022-02-01 PROCEDURE — 99223 1ST HOSP IP/OBS HIGH 75: CPT | Performed by: INTERNAL MEDICINE

## 2022-02-01 PROCEDURE — P9016 RBC LEUKOCYTES REDUCED: HCPCS

## 2022-02-01 PROCEDURE — 86900 BLOOD TYPING SEROLOGIC ABO: CPT

## 2022-02-01 PROCEDURE — 85018 HEMOGLOBIN: CPT

## 2022-02-01 PROCEDURE — 2580000003 HC RX 258: Performed by: PHYSICIAN ASSISTANT

## 2022-02-01 PROCEDURE — 83540 ASSAY OF IRON: CPT

## 2022-02-01 PROCEDURE — 86901 BLOOD TYPING SEROLOGIC RH(D): CPT

## 2022-02-01 PROCEDURE — 84484 ASSAY OF TROPONIN QUANT: CPT

## 2022-02-01 PROCEDURE — 85014 HEMATOCRIT: CPT

## 2022-02-01 PROCEDURE — 82728 ASSAY OF FERRITIN: CPT

## 2022-02-01 PROCEDURE — 6360000002 HC RX W HCPCS: Performed by: PHYSICIAN ASSISTANT

## 2022-02-01 PROCEDURE — 87635 SARS-COV-2 COVID-19 AMP PRB: CPT

## 2022-02-01 PROCEDURE — C9113 INJ PANTOPRAZOLE SODIUM, VIA: HCPCS | Performed by: PHYSICIAN ASSISTANT

## 2022-02-01 PROCEDURE — 93010 ELECTROCARDIOGRAM REPORT: CPT | Performed by: INTERNAL MEDICINE

## 2022-02-01 PROCEDURE — 96374 THER/PROPH/DIAG INJ IV PUSH: CPT

## 2022-02-01 PROCEDURE — 82607 VITAMIN B-12: CPT

## 2022-02-01 PROCEDURE — 99284 EMERGENCY DEPT VISIT MOD MDM: CPT

## 2022-02-01 PROCEDURE — 93005 ELECTROCARDIOGRAM TRACING: CPT | Performed by: PHYSICIAN ASSISTANT

## 2022-02-01 PROCEDURE — 2060000000 HC ICU INTERMEDIATE R&B

## 2022-02-01 PROCEDURE — 85610 PROTHROMBIN TIME: CPT

## 2022-02-01 PROCEDURE — G0328 FECAL BLOOD SCRN IMMUNOASSAY: HCPCS

## 2022-02-01 PROCEDURE — 86923 COMPATIBILITY TEST ELECTRIC: CPT

## 2022-02-01 PROCEDURE — 36430 TRANSFUSION BLD/BLD COMPNT: CPT

## 2022-02-01 PROCEDURE — 80048 BASIC METABOLIC PNL TOTAL CA: CPT

## 2022-02-01 PROCEDURE — 6360000004 HC RX CONTRAST MEDICATION: Performed by: NURSE PRACTITIONER

## 2022-02-01 PROCEDURE — 36415 COLL VENOUS BLD VENIPUNCTURE: CPT

## 2022-02-01 PROCEDURE — 6360000004 HC RX CONTRAST MEDICATION

## 2022-02-01 PROCEDURE — 74177 CT ABD & PELVIS W/CONTRAST: CPT

## 2022-02-01 PROCEDURE — 85025 COMPLETE CBC W/AUTO DIFF WBC: CPT

## 2022-02-01 PROCEDURE — 82746 ASSAY OF FOLIC ACID SERUM: CPT

## 2022-02-01 PROCEDURE — 96361 HYDRATE IV INFUSION ADD-ON: CPT

## 2022-02-01 PROCEDURE — 80076 HEPATIC FUNCTION PANEL: CPT

## 2022-02-01 RX ORDER — PANTOPRAZOLE SODIUM 40 MG/10ML
80 INJECTION, POWDER, LYOPHILIZED, FOR SOLUTION INTRAVENOUS ONCE
Status: COMPLETED | OUTPATIENT
Start: 2022-02-01 | End: 2022-02-01

## 2022-02-01 RX ORDER — ACETAMINOPHEN 325 MG/1
650 TABLET ORAL EVERY 6 HOURS PRN
Status: DISCONTINUED | OUTPATIENT
Start: 2022-02-01 | End: 2022-02-04 | Stop reason: HOSPADM

## 2022-02-01 RX ORDER — ONDANSETRON 4 MG/1
4 TABLET, ORALLY DISINTEGRATING ORAL EVERY 8 HOURS PRN
Status: DISCONTINUED | OUTPATIENT
Start: 2022-02-01 | End: 2022-02-04 | Stop reason: HOSPADM

## 2022-02-01 RX ORDER — PEG-3350, SODIUM SULFATE, SODIUM CHLORIDE, POTASSIUM CHLORIDE, SODIUM ASCORBATE AND ASCORBIC ACID 7.5-2.691G
100 KIT ORAL ONCE
Status: COMPLETED | OUTPATIENT
Start: 2022-02-01 | End: 2022-02-01

## 2022-02-01 RX ORDER — 0.9 % SODIUM CHLORIDE 0.9 %
500 INTRAVENOUS SOLUTION INTRAVENOUS ONCE
Status: COMPLETED | OUTPATIENT
Start: 2022-02-01 | End: 2022-02-01

## 2022-02-01 RX ORDER — SODIUM CHLORIDE 9 MG/ML
INJECTION, SOLUTION INTRAVENOUS PRN
Status: DISCONTINUED | OUTPATIENT
Start: 2022-02-01 | End: 2022-02-04 | Stop reason: HOSPADM

## 2022-02-01 RX ORDER — SODIUM CHLORIDE 0.9 % (FLUSH) 0.9 %
5-40 SYRINGE (ML) INJECTION PRN
Status: DISCONTINUED | OUTPATIENT
Start: 2022-02-01 | End: 2022-02-04 | Stop reason: HOSPADM

## 2022-02-01 RX ORDER — SODIUM CHLORIDE 0.9 % (FLUSH) 0.9 %
5-40 SYRINGE (ML) INJECTION EVERY 12 HOURS SCHEDULED
Status: DISCONTINUED | OUTPATIENT
Start: 2022-02-01 | End: 2022-02-04 | Stop reason: HOSPADM

## 2022-02-01 RX ORDER — SODIUM CHLORIDE 9 MG/ML
25 INJECTION, SOLUTION INTRAVENOUS PRN
Status: DISCONTINUED | OUTPATIENT
Start: 2022-02-01 | End: 2022-02-04 | Stop reason: HOSPADM

## 2022-02-01 RX ORDER — ACETAMINOPHEN 650 MG/1
650 SUPPOSITORY RECTAL EVERY 6 HOURS PRN
Status: DISCONTINUED | OUTPATIENT
Start: 2022-02-01 | End: 2022-02-04 | Stop reason: HOSPADM

## 2022-02-01 RX ORDER — POLYETHYLENE GLYCOL 3350 17 G/17G
17 POWDER, FOR SOLUTION ORAL DAILY PRN
Status: DISCONTINUED | OUTPATIENT
Start: 2022-02-01 | End: 2022-02-04 | Stop reason: HOSPADM

## 2022-02-01 RX ORDER — ONDANSETRON 2 MG/ML
4 INJECTION INTRAMUSCULAR; INTRAVENOUS EVERY 6 HOURS PRN
Status: DISCONTINUED | OUTPATIENT
Start: 2022-02-01 | End: 2022-02-04 | Stop reason: HOSPADM

## 2022-02-01 RX ADMIN — PANTOPRAZOLE SODIUM 80 MG: 40 INJECTION, POWDER, FOR SOLUTION INTRAVENOUS at 10:40

## 2022-02-01 RX ADMIN — PEG-3350, SODIUM SULFATE, SODIUM CHLORIDE, POTASSIUM CHLORIDE, SODIUM ASCORBATE AND ASCORBIC ACID 100 G: KIT at 18:26

## 2022-02-01 RX ADMIN — IOPAMIDOL 75 ML: 755 INJECTION, SOLUTION INTRAVENOUS at 18:10

## 2022-02-01 RX ADMIN — SODIUM CHLORIDE 500 ML: 9 INJECTION, SOLUTION INTRAVENOUS at 10:06

## 2022-02-01 RX ADMIN — IOHEXOL: 240 INJECTION, SOLUTION INTRATHECAL; INTRAVASCULAR; INTRAVENOUS; ORAL at 15:45

## 2022-02-01 RX ADMIN — PEG-3350, SODIUM SULFATE, SODIUM CHLORIDE, POTASSIUM CHLORIDE, SODIUM ASCORBATE AND ASCORBIC ACID 100 G: KIT at 20:34

## 2022-02-01 ASSESSMENT — ENCOUNTER SYMPTOMS
VOMITING: 0
DIARRHEA: 0
SHORTNESS OF BREATH: 0
BLOOD IN STOOL: 0
NAUSEA: 0
RECTAL PAIN: 0
CONSTIPATION: 0
COLOR CHANGE: 0
ABDOMINAL PAIN: 0

## 2022-02-01 NOTE — ACP (ADVANCE CARE PLANNING)
Advanced Care Planning Note.     Purpose of Encounter: Advanced care planning in light of hospitalization  Parties In Attendance: Patient,    Decisional Capacity: Yes  Subjective: Patient  understand that this conversation is to address long term care goal  Objective: patient admitted with gi bleed and blood loss anemia  Goals of Care Determination: Patient would not want cpr if required, but would consider intubation if required and possiblity of being extubated  Code Status: DNR CCA yes to intubated if required   Time spent on Advanced care Plannin minutes  Advanced Care Planning Documents: documented patient's wishes, would like Wife Zuleima Pascal to make medical decisions if unable to make decisions    Jalen Thibodeaux MD  2022 12:30 PM

## 2022-02-01 NOTE — CONSULTS
[facility]   CONSULT NOTE    2/1/2022 3:02 PM    Patient Information: Jeffrey Venegas   Date of Admit:  2/1/2022  Primary Care Physician:  Jude Ayon MD  Requesting Physician:  Joey Nolan MD    Reason for consult:   Evaluation and recommendations for lung nodules, hepatic mass    Chief complaint:    Chief Complaint   Patient presents with    Fatigue     Pt brought in by EMS from home states he had blood drawn yesterday and was told by his pcp his hgb is low and to come to ED       History of Present Illness:  Jeffrey Venegas is a 78 y.o. male on Joey Nolan MD service who was admitted on 2/1/2022 for fatigue. He was found to have severe anemia upon admission. He denies melena and hematochezia. Guaiac positive. CT of the chest today shows multiple bibasilar pulmonary nodules, mediastinal and hilar adenopathy, low attenuation lesions in the liver, retroperitoneal adenopathy and nodularity throughout the wall of the esophagus. He has no prior history of cancer. He was recently treated for iron deficiency by Dr. Delilah Mortimer with [facility]. He received 2 doses of Injectafer in December 2021. He has noticed decreased appetite over the past 6 months and has lost about 10 lbs in that time. He is a former smoker and quit in the early 90's, he reports smoking 1 ppd and pipe. He drinks one beer every 2 weeks. Past Medical History:     has a past medical history of Allergic rhinitis, Anxiety state, CAD (coronary artery disease), Cataract, Chronic ischemic heart disease, Esophageal reflux, Essential hypertension, Glaucoma, Influenza, Insomnia, and Other and unspecified hyperlipidemia.      Past Surgical History:    Past Surgical History:   Procedure Laterality Date    CHOLECYSTECTOMY, LAPAROSCOPIC  01/17/2018    LAPAROSCOPIC CHOLECYSTECTOMY WITH CHOLANGIOGRAM    COLONOSCOPY      total of 3, first one polyps, 2nd and 3rd no polyps    [address]      Dr Sayra Prather ERCP  01/16/2018    EYE SURGERY      bilateral cataracts removed, \"floaters\"    HERNIA REPAIR      bilateral inguinal hernia repairs        Current Medications:     sodium chloride flush  5-40 mL IntraVENous 2 times per day    PEG-KCl-NaCl-NaSulf-Na Asc-C  100 g Oral Once    PEG-KCl-NaCl-NaSulf-Na Asc-C  100 g Oral Once       Allergies:    No Known Allergies     Social History:    reports that he quit smoking about 33 years ago. His smoking use included cigarettes. He has a 35.00 pack-year smoking history. He has never used smokeless tobacco. He reports current alcohol use. He reports that he does not use drugs. Family History:     family history includes Cancer in his mother; Dementia in his mother; Stroke in his father. ROS:      Constitutional: No fever, No chills, No night sweats.  +Fatigue, 10 lb weight loss  Eyes:  No impairment or change in vision  ENT / Mouth:  No pain, abnormal ulceration, bleeding, nasal drip or change in voice or hearing  Cardiovascular:  No chest pain, palpitations, new edema, or calf discomfort  Respiratory:  No pain, hemoptysis, change to breathing  Gastrointestinal:  No pain, cramping, jaundice, change to eating and bowel habits  Urinary:  No pain, bleeding or change in continence  Musculoskeletal:  No redness, pain, edema or weakness  Skin:  No pruritus, rash, change to nodules or lesions  Neurologic:  No discomfort, change in mental status, speech, sensory or motor activity  Psychiatric:  No change in concentration or change to affect or mood  Endocrine:  No hot flashes, increased thirst, or change to urine production  Hematologic: No petechiae, ecchymosis or bleeding  Lymphatic:  No lymphadenopathy or lymphedema  Allergy / Immunologic:  No eczema, hives, frequent or recurrent infections      PHYSICAL EXAM:    Vitals:  Vitals:    02/01/22 1409   BP: 136/63   Pulse: 56   Resp: 15   Temp: 98.2 °F (36.8 °C)   SpO2: 100%      No intake or output data in the 24 hours ending 02/01/22 1502   Wt Readings from Last 3 Encounters:   02/01/22 155 lb (70.3 kg)   04/28/21 165 lb (74.8 kg)   01/23/18 176 lb (79.8 kg)        General appearance: Appears comfortable. Pale. Eyes: Sclera clear, pupils equal  ENT: Moist mucus membranes, no thrush  Neck: Trachea midline, symmetrical  Cardiovascular: Regular rhythm, normal S1, S2. No murmur, gallop, rub. No edema in  lower extremities  Respiratory: Clear to auscultation bilaterally. No wheeze. Good inspiratory effort  Gastrointestinal: Abdomen soft, not tender, not distended, normal bowel sounds  Musculoskeletal: No cyanosis in digits, warm extremities  Neurologic: Cranial nerves grossly intact, no motor or speech deficits. Psychiatric: Normal affect. Alert and oriented to time, place and person.   Skin: Warm, dry, normal turgor, no rash    DATA:  CBC:   Lab Results   Component Value Date    WBC 4.9 02/01/2022    RBC 2.40 02/01/2022    HGB 6.5 02/01/2022    HCT 21.2 02/01/2022    MCV 88.2 02/01/2022    MCH 27.1 02/01/2022    MCHC 30.7 02/01/2022    RDW 18.2 02/01/2022     02/01/2022    MPV 7.0 02/01/2022     BMP:  Lab Results   Component Value Date     02/01/2022    K 3.9 02/01/2022     02/01/2022    CO2 18 02/01/2022    BUN 35 02/01/2022    CREATININE 0.9 02/01/2022    CALCIUM 8.2 02/01/2022    GFRAA >60 02/01/2022    GFRAA >60 04/12/2013    LABGLOM >60 02/01/2022    GLUCOSE 111 02/01/2022     Magnesium:   Lab Results   Component Value Date    MG 1.90 01/16/2018    MG 2.10 01/15/2018    MG 1.70 12/06/2017     LIVER PROFILE:   Recent Labs     02/01/22  1427   AST 17   ALT 11   BILIDIR 0.3   BILITOT 0.6   ALKPHOS 91     PT/INR:    Lab Results   Component Value Date    PROTIME 13.2 02/01/2022    PROTIME 12.8 01/15/2018    PROTIME 14.2 12/06/2017    INR 1.16 02/01/2022    INR 1.13 01/15/2018    INR 1.26 12/06/2017     IMAGING:    Narrative   EXAMINATION:   ONE XRAY VIEW OF THE CHEST       2/1/2022 9:43 am       COMPARISON:   Chest radiograph 04/28/2021       HISTORY:   ORDERING SYSTEM PROVIDED HISTORY: fatigue   TECHNOLOGIST PROVIDED HISTORY:   Reason for exam:->fatigue   Reason for Exam: Fatigue (Pt brought in by EMS from home states he had blood   drawn yesterday and was told by his pcp his hgb is low and to come to ED)       FINDINGS:   Interval development of a rounded opacity seen in the left lung base. Lungs   otherwise clear. No pleural fluid or pneumothorax. Cardiomediastinal   silhouette is unchanged with mild tortuosity of the descending thoracic aorta           Impression   New opacity seen at the left lung base indeterminate etiology. Pneumonia,   atelectasis or neoplasm are all on differential.  Recommend a follow-up   noncontrast chest CT for further evaluation. i         Narrative   EXAMINATION:   CT OF THE CHEST WITHOUT CONTRAST 2/1/2022 11:09 am       TECHNIQUE:   CT of the chest was performed without the administration of intravenous   contrast. Multiplanar reformatted images are provided for review. Dose   modulation, iterative reconstruction, and/or weight based adjustment of the   mA/kV was utilized to reduce the radiation dose to as low as reasonably   achievable. COMPARISON:   None. HISTORY:   ORDERING SYSTEM PROVIDED HISTORY: new left lung opacity   TECHNOLOGIST PROVIDED HISTORY:   Reason for exam:->new left lung opacity   Decision Support Exception - unselect if not a suspected or confirmed   emergency medical condition->Emergency Medical Condition (MA)   Reason for Exam: new left lung opacity       FINDINGS:   Mediastinum: Small mediastinal and small bilateral hilar lymph nodes evident. Not optimally evaluated on this noncontrast chest CT. No pericardial   effusion. Coronary artery calcifications. Lungs/pleura: 16 mm nodule right lower lobe. Multiple small solid nodules in   the left lower lobe.   These nodules are all new compared to the previous   exam.  The largest nodule in the left lower lobe measures 14 mm. No pleural   effusion. No pneumothorax. Central airways are patent. Nodularity seen   throughout the esophageal wall. This appears new compared to the previous   exams. Upper Abdomen: Hiatal hernia. Low-attenuation lesions seen in the liver. Retro peer Antonietta Bear in the visualized superior aspect of the abdomen. Soft Tissues/Bones: No acute fracture. No lytic or blastic lesion. Impression   Multiple solid nodules seen in the lower lobes, largest measuring 16 mm in   the superior segment of the right lower lobe. There are mediastinal and   hilar lymph nodes. Most of these are not pathologically enlarged. However,   there is a 15 mm lymph nodes seen in the AP window. Low-attenuation lesions in the liver. This also is suboptimally evaluated   with out contrast.  However, several of these lesions appear new. Metastatic   disease in the liver needs to be considered. There also appears to be   retroperitoneal adenopathy in the visualized portion of the superior aspect   of the abdomen. Findings in the lungs, mediastinum, visualized portion of the retroperitoneum   and the liver suggest a neoplastic process       In addition, nodularity is seen throughout the wall of the esophagus, not   evident on the previous exam.  Esophagitis could give this appearance. RECOMMENDATIONS:   Unavailable           IMPRESSION/RECOMMENDATIONS:    Active Problems:    Acute GI bleeding  Resolved Problems:    * No resolved hospital problems. *       78 year old male with a history of allergic rhinitis, CAD, GERD, glaucoma, HTN, and hyperlipidemia. He has:    1.  CT of the chest with multiple bibasilar pulmonary nodules, mediastinal and hilar adenopathy, low attenuation lesions in the liver, retroperitoneal adenopathy and nodularity throughout the wall of the esophagus  -Findings suggestive of metastatic disease.   -Will need tissue biopsy for diagnosis.    -Will obtain CT of the abdomen and pelvis with contrast.  -GI following. Planning for EGD and colonoscopy tomorrow.   -Pulmonology is following. If no source of malignancy is found during EGD or colonoscopy a CT guided biopsy of the right lower lobe nodule will be done. 2. Anemia  -Due to GI blood loss. -Will have EGD and colonoscopy tomorrow.  -Check iron studies, B12 and folate.  -Transfuse if hgb <7. This plan was discussed with the patient and he/she verbalized understanding. Thank you for allowing us to participate in the care of this patient. Angeline Crvaen, [place]  [facility] 13  [phone]      Agree with above note. Follow up on EGD and Colonoscopy.        Kev Steen MD  [facility]

## 2022-02-01 NOTE — CONSULTS
PULMONARY AND CRITICAL CARE CONSULTATION NOTE    CONSULTING PHYSICIAN:      REASON FOR CONSULT:   Chief Complaint   Patient presents with    Fatigue     Pt brought in by EMS from home states he had blood drawn yesterday and was told by his pcp his hgb is low and to come to ED       DATE OF CONSULT: 2/1/2022    HISTORY OF PRESENT ILLNESS: 78y.o. year old male with past medical history of CAD on aspirin 81 mg, GERD who presented to hospital with complaints of fatigue with poor appetite weight loss and dysphagia. Patient has the symptoms going on for almost 10 days. He had outpatient labs drawn by his primary care physician that showed a hemoglobin of 6.5 and patient was sent to the hospital.  Patient stated that he has been noticing some dark stools but no active bleeding in stools. No vomiting of blood. Denies any nausea vomiting or diarrhea. Denies any shortness of breath cough or chest pain or hemoptysis. He was noted to have positive stool occult. I noted platelets within normal limits. Patient stated that he was taking Tylenol, Excedrin as well as indomethacin 25 mg daily for pain. He was having some facial pain for which she was seen by neurologist and was advised to stop Excedrin and start indomethacin 25 mg daily. He took indomethacin for roughly 10 days and then stop the medication. Patient underwent CT chest that showed bilateral lung nodules, the largest of which was 1.6 cm in right lower lobe. Also mediastinal lymphadenopathy involving 4R lymph nodes with fatty hilum and subcarinal lymphadenopathy and small bilateral hilar lymphadenopathy. Esophagus was dilated with nodularity. Liver lesions were also noted. I personally viewed and interpreted the images. Patient is has been seen by GI and plan is to undergo EGD and colonoscopy tomorrow. Patient denies any history of cancer. He has remote history of smoking.   He had colonoscopy roughly 5 years ago which was normal.    REVIEW OF SYSTEMS:   CONSTITUTIONAL SYMPTOMS: The patient denies fever, fatigue, night sweats, weight loss or weight gain. HEENT: No vision changes. No tinnitus, Denies sinus pain. No hoarseness, or dysphagia. NECK: Patient denies swelling in the neck. CARDIOVASCULAR: Denies chest pain, palpitation, syncope. RESPIRATORY: See above  GASTROINTESTINAL: Denies nausea, abdominal pain or change in bowel function. GENITOURINARY: Denies obstructive symptoms. No history of incontinence. BREASTS: No masses or lumps in the breasts. SKIN: No rashes or itching. MUSCULOSKELETAL: Denies weakness or bone pain. NEUROLOGICAL: No headaches or seizures. PSYCHIATRIC: Denies mood swings or depression. ENDOCRINE: Denies heat or cold intolerance or excessive thirst.  HEMATOLOGIC/LYMPHATIC: Denies easy bruising or lymph node swelling. ALLERGIC/IMMUNOLOGIC: No environmental allergies.     PAST MEDICAL HISTORY:   Past Medical History:   Diagnosis Date    Allergic rhinitis 8/17/2009    Anxiety state 9/6/2007    Overview:  ICD-10 Transition    CAD (coronary artery disease)     Cataract 12/7/2007    Overview:  ICD-10 Transition    Chronic ischemic heart disease 9/24/2008    Esophageal reflux 9/6/2007    Essential hypertension 12/7/2007    Overview:  ICD-10 Transition    Glaucoma     left eye    Influenza 12/06/2017    Insomnia 9/24/2008    Other and unspecified hyperlipidemia 9/24/2008       PAST SURGICAL HISTORY:   Past Surgical History:   Procedure Laterality Date    CHOLECYSTECTOMY, LAPAROSCOPIC  01/17/2018    LAPAROSCOPIC CHOLECYSTECTOMY WITH CHOLANGIOGRAM    COLONOSCOPY      total of 3, first one polyps, 2nd and 3rd no polyps   [address]      Dr Abhi Ko ERCP  01/16/2018    EYE SURGERY      bilateral cataracts removed, \"floaters\"    HERNIA REPAIR      bilateral inguinal hernia repairs       SOCIAL HISTORY:   Social History     Tobacco Use    Smoking status: Former Smoker Packs/day: 1.00     Years: 35.00     Pack years: 35.00     Types: Cigarettes     Quit date: 1988     Years since quittin.4    Smokeless tobacco: Never Used   Substance Use Topics    Alcohol use: Yes     Comment: beer occasionally    Drug use: No       FAMILY HISTORY:   Family History   Problem Relation Age of Onset    Cancer Mother     Dementia Mother     Stroke Father        MEDICATIONS:     No current facility-administered medications on file prior to encounter.      Current Outpatient Medications on File Prior to Encounter   Medication Sig Dispense Refill    loratadine (CLARITIN) 10 MG tablet Take 10 mg by mouth as needed      ondansetron (ZOFRAN) 4 MG tablet Take 1 tablet by mouth every 4 hours as needed for Nausea or Vomiting 15 tablet 0    pantoprazole (PROTONIX) 20 MG tablet Take 1 tablet by mouth daily 30 tablet 0    acetaminophen (TYLENOL) 325 MG tablet Take 2 tablets by mouth every 4 hours as needed for Pain or Fever 120 tablet 3    aspirin 81 MG tablet Take 81 mg by mouth daily      atenolol (TENORMIN) 25 MG tablet Take 50 mg by mouth daily       ALPHAGAN P 0.1 % SOLN Place 1 drop into the left eye 3 times daily       dorzolamide (TRUSOPT) 2 % ophthalmic solution Place 1 drop into the right eye 3 times daily       lisinopril (PRINIVIL;ZESTRIL) 5 MG tablet Take 5 mg by mouth daily       simvastatin (ZOCOR) 40 MG tablet Take 40 mg by mouth nightly       zolpidem (AMBIEN) 5 MG tablet TAKE ONE TABLET BY MOUTH EVERY NIGHT AT BEDTIME AS NEEDED          sodium chloride flush  5-40 mL IntraVENous 2 times per day    PEG-KCl-NaCl-NaSulf-Na Asc-C  100 g Oral Once    PEG-KCl-NaCl-NaSulf-Na Asc-C  100 g Oral Once      sodium chloride      sodium chloride       sodium chloride, sodium chloride flush, sodium chloride, ondansetron **OR** ondansetron, polyethylene glycol, acetaminophen **OR** acetaminophen    ALLERGIES:   Allergies as of 2022    (No Known Allergies)      OBJECTIVE: height is 5' 10\" (1.778 m) and weight is 155 lb (70.3 kg). His oral temperature is 98.2 °F (36.8 °C). His blood pressure is 136/63 and his pulse is 56. His respiration is 15 and oxygen saturation is 100%. No intake/output data recorded. PHYSICAL EXAM:  CONSTITUTIONAL: He is a 78y.o.-year-old who appears his stated age. He is alert and oriented x 3 and in no acute distress. HEENT: PERRL. No scleral icterus. No thrush, atraumatic, normocephalic. NECK: Supple, without cervical or supraclavicular lymphadenopathy:  CARDIOVASCULAR: S1 S2 RRR. Without murmer  RESPIRATORY & CHEST: Lungs are clear to auscultation and percussion. No wheezing, no crackles. Good air movement  GASTROINTESTINAL & ABDOMEN: Soft, nontender, positive bowel sounds in all quadrants, non-distended, without hepatosplenomegaly. GENITOURINARY: Deferred. MUSCULOSKELETAL: No tenderness to palpation of the axial skeleton. There is no clubbing. No cyanosis. No edema of the lower extremities. SKIN OF BODY: No rash or jaundice. PSYCHIATRIC EVALUATION: Normal affect. Patient answers questions appropriately. HEMATOLOGIC/LYMPHATIC/ IMMUNOLOGIC: No palpable lymphadenopathy. NEUROLOGIC: Alert and oriented x 3. Groslly non-focal. Motor strength is 5+/5 in all muscle groups. The patient has a normal sensorium globally.       LABS:  Lab Results   Component Value Date    WBC 4.9 02/01/2022    HGB 6.5 (LL) 02/01/2022    HCT 21.2 (L) 02/01/2022     02/01/2022    CHOL 85 10/01/2020    TRIG 94 10/01/2020    HDL 41 10/01/2020    ALT 11 02/01/2022    AST 17 02/01/2022     02/01/2022    K 3.9 02/01/2022     (H) 02/01/2022    CREATININE 0.9 02/01/2022    BUN 35 (H) 02/01/2022    CO2 18 (L) 02/01/2022    INR 1.16 (H) 02/01/2022       Lab Results   Component Value Date    GLUCOSE 111 (H) 02/01/2022    CALCIUM 8.2 (L) 02/01/2022     02/01/2022    K 3.9 02/01/2022    CO2 18 (L) 02/01/2022     (H) 02/01/2022    BUN 35 (H) 02/01/2022 CREATININE 0.9 02/01/2022       IMPRESSION:   Bilateral pulmonary nodules  Mediastinal hilar lymphadenopathy  Dilated esophagus  Hepatic lesions  Upper GI bleed  Acute blood loss anemia      RECOMMENDATION:   Patient presents with likely upper GI bleed with low hemoglobin. Imaging studies showed bilateral multiple lung nodules likely metastatic along with small mediastinal and hilar lymphadenopathy. Esophagus was dilated with some liver lesions. Findings highly suggestive of malignancy, likely GI origin. Patient is scheduled for EGD and colonoscopy tomorrow. If endoscopy is suggestive of GI malignancy, then patient does not need any further work-up. If endoscopy findings are benign, then I would recommend to proceed with IR guided biopsy of left lower lobe lung nodule. Plan was discussed with patient and his wife. Thank you for your consultation. We will continue to follow the patient. Johny Lundberg MD  P[facility] Care and [facility] Medicine  2/1/2022, 3:22 PM    This note was completed using dragon medical speech recognition software. Grammatical errors, random word insertions, pronoun errors and incomplete sentences are occasional consequences of this technology due to software limitations. If there are questions or concerns about the content of this note of information contained within the body of this dictation they should be addressed with the provider for clarification.

## 2022-02-01 NOTE — ANESTHESIA PRE PROCEDURE
Department of Anesthesiology  Preprocedure Note       Name:  Monica Troncoso   Age:  78 y.o.  :  1940                                          MRN:  [id number]         Date:  2022      Surgeon: Doug Otoole):  Aurora Teresa MD    Procedure: Procedure(s):  EGD DIAGNOSTIC ONLY  COLONOSCOPY DIAGNOSTIC    Medications prior to admission:   Prior to Admission medications    Medication Sig Start Date End Date Taking?  Authorizing Provider   loratadine (CLARITIN) 10 MG tablet Take 10 mg by mouth as needed    Historical Provider, MD   ondansetron (ZOFRAN) 4 MG tablet Take 1 tablet by mouth every 4 hours as needed for Nausea or Vomiting 18   Celeste Bowen MD   pantoprazole (PROTONIX) 20 MG tablet Take 1 tablet by mouth daily 18   Fernando Sullivan MD   acetaminophen (TYLENOL) 325 MG tablet Take 2 tablets by mouth every 4 hours as needed for Pain or Fever 17   Fernando Sullivan MD   aspirin 81 MG tablet Take 81 mg by mouth daily    Historical Provider, MD   atenolol (TENORMIN) 25 MG tablet Take 50 mg by mouth daily  10/15/17   Historical Provider, MD   ALPHAGAN P 0.1 % SOLN Place 1 drop into the left eye 3 times daily  17   Historical Provider, MD   dorzolamide (TRUSOPT) 2 % ophthalmic solution Place 1 drop into the right eye 3 times daily  3/6/16   Historical Provider, MD   lisinopril (PRINIVIL;ZESTRIL) 5 MG tablet Take 5 mg by mouth daily  10/15/17   Historical Provider, MD   simvastatin (ZOCOR) 40 MG tablet Take 40 mg by mouth nightly  17   Historical Provider, MD   zolpidem (AMBIEN) 5 MG tablet TAKE ONE TABLET BY MOUTH EVERY NIGHT AT BEDTIME AS NEEDED 10/25/16   Historical Provider, MD       Current medications:    Current Facility-Administered Medications   Medication Dose Route Frequency Provider Last Rate Last Admin    0.9 % sodium chloride infusion   IntraVENous PRN EVELYNE Winkler        PEG-KCl-NaCl-NaSulf-Na Asc-C (MOVIPREP) solution 100 g  880 g Oral Once Scarlet Peach MAINOR Gracia        PEG-KCl-NaCl-NaSulf-Na Asc-C (MOVIPREP) solution 100 g  109 g Oral Once Milly Wall PA-C         Current Outpatient Medications   Medication Sig Dispense Refill    loratadine (CLARITIN) 10 MG tablet Take 10 mg by mouth as needed      ondansetron (ZOFRAN) 4 MG tablet Take 1 tablet by mouth every 4 hours as needed for Nausea or Vomiting 15 tablet 0    pantoprazole (PROTONIX) 20 MG tablet Take 1 tablet by mouth daily 30 tablet 0    acetaminophen (TYLENOL) 325 MG tablet Take 2 tablets by mouth every 4 hours as needed for Pain or Fever 120 tablet 3    aspirin 81 MG tablet Take 81 mg by mouth daily      atenolol (TENORMIN) 25 MG tablet Take 50 mg by mouth daily       ALPHAGAN P 0.1 % SOLN Place 1 drop into the left eye 3 times daily       dorzolamide (TRUSOPT) 2 % ophthalmic solution Place 1 drop into the right eye 3 times daily       lisinopril (PRINIVIL;ZESTRIL) 5 MG tablet Take 5 mg by mouth daily       simvastatin (ZOCOR) 40 MG tablet Take 40 mg by mouth nightly       zolpidem (AMBIEN) 5 MG tablet TAKE ONE TABLET BY MOUTH EVERY NIGHT AT BEDTIME AS NEEDED         Allergies:  No Known Allergies    Problem List:    Patient Active Problem List   Diagnosis Code    Chronic ischemic heart disease I25.9    Cataract H26.9    Allergic rhinitis J30.9    Essential hypertension I10    Esophageal reflux K21.9    Other and unspecified hyperlipidemia E78.5    Insomnia G47.00    Glaucoma H40.9    Anxiety state F41.1    SVT (supraventricular tachycardia) (HCC) I47.1    Coronary artery disease involving native coronary artery of native heart without angina pectoris I25.10    Ascending cholangitis K83.09    Acute pancreatitis K85.90    Acute gallstone pancreatitis K85.10    Cholecystitis K81.9    Abnormal LFTs R94.5    Acute GI bleeding K92.2       Past Medical History:        Diagnosis Date    Allergic rhinitis 8/17/2009    Anxiety state 9/6/2007    Overview:  ICD-10 Transition    CAD (coronary artery disease)     Cataract 2007    Overview:  ICD-10 Transition    Chronic ischemic heart disease 2008    Esophageal reflux 2007    Essential hypertension 2007    Overview:  ICD-10 Transition    Glaucoma     left eye    Influenza 2017    Insomnia 2008    Other and unspecified hyperlipidemia 2008       Past Surgical History:        Procedure Laterality Date    CHOLECYSTECTOMY, LAPAROSCOPIC  2018    LAPAROSCOPIC CHOLECYSTECTOMY WITH CHOLANGIOGRAM    COLONOSCOPY      total of 3, first one polyps, 2nd and 3rd no polyps   Read Ours      Dr Julius Meade ERCP  2018    EYE SURGERY      bilateral cataracts removed, \"floaters\"    HERNIA REPAIR      bilateral inguinal hernia repairs       Social History:    Social History     Tobacco Use    Smoking status: Former Smoker     Packs/day: 1.00     Years: 35.00     Pack years: 35.00     Types: Cigarettes     Quit date: 1988     Years since quittin.4    Smokeless tobacco: Never Used   Substance Use Topics    Alcohol use: Yes     Comment: beer occasionally                                Counseling given: Not Answered      Vital Signs (Current):   Vitals:    22 1245 22 1300 22 1315 22 1330   BP: 133/66 122/62 125/71 129/61   Pulse: 56 57 60 58   Resp: 14 19 19 18   Temp:       TempSrc:       SpO2:  100% 100% 100%   Weight:                                                  BP Readings from Last 3 Encounters:   22 129/61   21 134/74   18 120/80       NPO Status:                                                                                 BMI:   Wt Readings from Last 3 Encounters:   22 155 lb (70.3 kg)   21 165 lb (74.8 kg)   18 176 lb (79.8 kg)     Body mass index is 22.24 kg/m².     CBC:   Lab Results   Component Value Date    WBC 4.9 2022    RBC 2.40 2022    HGB 6.5 2022 HCT 21.2 02/01/2022    MCV 88.2 02/01/2022    RDW 18.2 02/01/2022     02/01/2022       CMP:   Lab Results   Component Value Date     02/01/2022    K 3.9 02/01/2022     02/01/2022    CO2 18 02/01/2022    BUN 35 02/01/2022    CREATININE 0.9 02/01/2022    GFRAA >60 02/01/2022    GFRAA >60 04/12/2013    AGRATIO 1.1 04/28/2021    LABGLOM >60 02/01/2022    GLUCOSE 111 02/01/2022    PROT 7.3 04/28/2021    PROT 7.0 03/09/2012    CALCIUM 8.2 02/01/2022    BILITOT 0.5 04/28/2021    ALKPHOS 73 04/28/2021    AST 22 04/28/2021    ALT 18 04/28/2021       POC Tests: No results for input(s): POCGLU, POCNA, POCK, POCCL, POCBUN, POCHEMO, POCHCT in the last 72 hours. Coags:   Lab Results   Component Value Date    PROTIME 13.2 02/01/2022    INR 1.16 02/01/2022    APTT 28.2 02/01/2022       HCG (If Applicable): No results found for: PREGTESTUR, PREGSERUM, HCG, HCGQUANT     ABGs: No results found for: PHART, PO2ART, UWF2WBF, GPP4DKF, BEART, A0FFLEHE     Type & Screen (If Applicable):  No results found for: LABABO, LABRH    Drug/Infectious Status (If Applicable):  No results found for: HIV, HEPCAB    COVID-19 Screening (If Applicable): No results found for: COVID19        Anesthesia Evaluation  Nursing notes reviewed no history of anesthetic complications:   Airway:         Dental:          Pulmonary:                              Cardiovascular:    (+) hypertension:, CAD:, dysrhythmias (hx SVT):,                   Neuro/Psych:               GI/Hepatic/Renal:   (+) GERD:,          ROS comment: GI Bleed. Endo/Other:                     Abdominal:             Vascular: Other Findings:             Anesthesia Plan      MAC     ASA 3                 Plan discussed with attending.                   Eliseo WEBER, APRN - CRNA   2/1/2022

## 2022-02-01 NOTE — ED PROVIDER NOTES
I did not see this patient personally. I only interpreted their EKG.   Reveals:  Normal sinus rhythm  No ST changes  Heart rate 72  1st deg av block  Similar to prior EKG performed in 4/21       Emilie Shukla MD  02/01/22 [id number]

## 2022-02-01 NOTE — ED NOTES
Bed: 11  Expected date:   Expected time:   Means of arrival:   Comments:  [place]-low H/H     Lorena Canas RN  02/01/22 0054

## 2022-02-01 NOTE — CONSULTS
Gastroenterology Consult Note        Patient: Ritchie Goel  : 1940  Acct#:      Date:  2022    Subjective:       History of Present Illness  Patient is a 78 y.o.  male admitted with No admission diagnoses are documented for this encounter. who is seen in consult for anemia. History of hypertension, GERD, CAD. Patient was seen in 2019 for gallstone pancreatitis. Underwent ERCP and cholecystectomy as below. Patient states he began feeling sick 10 days ago. Had cold-like symptoms and then had fatigue and was in bed most of the week. Reports having 2 negative Covid tests. PCP checked labs and his hemoglobin was 6.5 so he was sent to the ER. Patient denies any black stools or bloody stools. No abdominal pain, nausea, vomiting. Did have some constipation this week. States has intermittent heartburn so just takes PPI as needed. Has not needed any of this lately. Has noticed some slight dysphagia over the past couple weeks. Lost about 5 pounds in the past week.     Past Medical History:   Diagnosis Date    Allergic rhinitis 2009    Anxiety state 2007    Overview:  ICD-10 Transition    CAD (coronary artery disease)     Cataract 2007    Overview:  ICD-10 Transition    Chronic ischemic heart disease 2008    Esophageal reflux 2007    Essential hypertension 2007    Overview:  ICD-10 Transition    Glaucoma     left eye    Influenza 2017    Insomnia 2008    Other and unspecified hyperlipidemia 2008      Past Surgical History:   Procedure Laterality Date    CHOLECYSTECTOMY, LAPAROSCOPIC  2018    LAPAROSCOPIC CHOLECYSTECTOMY WITH CHOLANGIOGRAM    COLONOSCOPY      total of 3, first one polyps, 2nd and 3rd no polyps    CORONARY Gabriel Spatz Dr Willy Dick ERCP  2018    EYE SURGERY      bilateral cataracts removed, \"floaters\"    HERNIA REPAIR      bilateral inguinal hernia repairs Past Endoscopic History  Patient reports last colonoscopy was over 5 years ago. ERCP 2018 with Dr Nerissa Gray    Indications: This is a 76y.o. year old male who presents today with obstructive jaundice with cholelithiasis and dilated common bile duct on CT. R/o Choledocholithiasis.       Impression:                1) Choledocholithiasis with obstruction                                      2) Biliary sphincterotomy and sludge extraction as above. Admission Meds  No current facility-administered medications on file prior to encounter.      Current Outpatient Medications on File Prior to Encounter   Medication Sig Dispense Refill    loratadine (CLARITIN) 10 MG tablet Take 10 mg by mouth as needed      ondansetron (ZOFRAN) 4 MG tablet Take 1 tablet by mouth every 4 hours as needed for Nausea or Vomiting 15 tablet 0    pantoprazole (PROTONIX) 20 MG tablet Take 1 tablet by mouth daily 30 tablet 0    acetaminophen (TYLENOL) 325 MG tablet Take 2 tablets by mouth every 4 hours as needed for Pain or Fever 120 tablet 3    aspirin 81 MG tablet Take 81 mg by mouth daily      atenolol (TENORMIN) 25 MG tablet Take 50 mg by mouth daily       ALPHAGAN P 0.1 % SOLN Place 1 drop into the left eye 3 times daily       dorzolamide (TRUSOPT) 2 % ophthalmic solution Place 1 drop into the right eye 3 times daily       lisinopril (PRINIVIL;ZESTRIL) 5 MG tablet Take 5 mg by mouth daily       simvastatin (ZOCOR) 40 MG tablet Take 40 mg by mouth nightly       zolpidem (AMBIEN) 5 MG tablet TAKE ONE TABLET BY MOUTH EVERY NIGHT AT BEDTIME AS NEEDED             Allergies  No Known Allergies   Social   Social History     Tobacco Use    Smoking status: Former Smoker     Packs/day: 1.00     Years: 35.00     Pack years: 35.00     Types: Cigarettes     Quit date: 1988     Years since quittin.4    Smokeless tobacco: Never Used   Substance Use Topics    Alcohol use: Yes     Comment: beer occasionally        Family History Problem Relation Age of Onset    Cancer Mother     Dementia Mother     Stroke Father           Review of Systems  Constitutional: negative for fevers, chills, sweats  + fatigue  Ears, nose, mouth, throat, and face: negative for nasal congestion and sore throat   Respiratory: negative for cough and shortness of breath   Cardiovascular: negative for chest pain and dyspnea   Gastrointestinal: see hpi   Genitourinary:negative for dysuria and frequency   Integument/breast: negative for pruritus and rash   Hematologic/lymphatic: negative for bleeding and easy bruising   Musculoskeletal:negative for arthralgias and myalgias   Neurological: negative for dizziness and weakness         Physical Exam  Blood pressure 124/70, pulse 54, temperature 97.9 °F (36.6 °C), resp. rate 16, weight 155 lb (70.3 kg), SpO2 100 %. General appearance: pale, alert, cooperative, no distress, appears stated age  Eyes: Anicteric  Head: Normocephalic, without obvious abnormality  Lungs: clear to auscultation bilaterally, Normal Effort  Heart: regular rate and rhythm, normal S1 and S2, no murmurs or rubs  Abdomen: soft, non-tender. Bowel sounds normal. No masses,  no organomegaly. Extremities: atraumatic, no cyanosis or edema  Skin: warm and dry, no jaundice  Neuro: Grossly intact, A&OX3  Musculoskeletal: 5/5  strength BUE      Data Review:    Recent Labs     02/01/22  0940   WBC 4.9   HGB 6.5*   HCT 21.2*   MCV 88.2        Recent Labs     02/01/22  0940      K 3.9   *   CO2 18*   BUN 35*   CREATININE 0.9     No results for input(s): AST, ALT, ALB, BILIDIR, BILITOT, ALKPHOS in the last 72 hours. No results for input(s): LIPASE, AMYLASE in the last 72 hours. Recent Labs     02/01/22  0940   PROTIME 13.2*   INR 1.16*     No results for input(s): PTT in the last 72 hours. No results for input(s): OCCULTBLD in the last 72 hours.     Imaging Studies:                 CT-scan of chest wo contrast 2/1/22  Impression Multiple solid nodules seen in the lower lobes, largest measuring 16 mm in   the superior segment of the right lower lobe.  There are mediastinal and   hilar lymph nodes.  Most of these are not pathologically enlarged.  However,   there is a 15 mm lymph nodes seen in the AP window.       Low-attenuation lesions in the liver.  This also is suboptimally evaluated   with out contrast.  However, several of these lesions appear new.  Metastatic   disease in the liver needs to be considered.  There also appears to be   retroperitoneal adenopathy in the visualized portion of the superior aspect   of the abdomen.       Findings in the lungs, mediastinum, visualized portion of the retroperitoneum   and the liver suggest a neoplastic process       In addition, nodularity is seen throughout the wall of the esophagus, not   evident on the previous exam.  Esophagitis could give this appearance.                        Assessment:     Active Problems:    * No active hospital problems. *  Resolved Problems:    * No resolved hospital problems. *    Anemia -hemoglobin is 14 last year. Hemoglobin currently 6.5 with MCV of 88. Patient denies any gross GI bleeding. Stools guaiac positive. Abnormal CT chest -CT with lung lesions, liver lesions concerning for metastatic disease, and lymphadenopathy. Also noted to have nodularity throughout the esophageal wall. Thurl Mutton Dysphagia -mild over past few weeks. Esophagus was abnormal on CT. Recommendations:   -PPI  -Getting 1 unit PRBC currently  -Monitor hemoglobin  -Await pulm and heme eval for abnormal CT findings  -Clear liquids  -Bowel prep today  -N.p.o. midnight  -EGD and colonoscopy tomorrow    Discussed with Dr. Nubia Wynne, PAALEXX  [facility]    I have personally performed a face to face diagnostic evaluation on this patient. I have interviewed and examined the patient and I agree with the findings and recommended plan of care.   In summary, my findings and plan are

## 2022-02-01 NOTE — ED PROVIDER NOTES
**EVALUATED BY RODOLFO**    [address] [facility]  eMERGENCY dEPARTMENT eNCOUnter    Pt Name: Santos Lopez  MRN: [id number]  [place] 1940  Date of evaluation: 2/1/2022  Provider: EVELYNE Luo    Chief Complaint:    Chief Complaint   Patient presents with    Fatigue     Pt brought in by EMS from home states he had blood drawn yesterday and was told by his pcp his hgb is low and to come to ED       Nursing Notes, Past Medical Hx, Past Surgical Hx, Social Hx, Allergies, and Family Hx were all reviewed and agreedwith or any disagreements were addressed in the HPI.    HPI:  (Location, Duration, Timing, Severity, Quality, Assoc Sx, Context, Modifying factors)  This is a  78 y.o. male who presents to the emergency department with complaints of feeling weak and fatigued for the past 7 to 10 days. Patient had blood drawn done yesterday and was told to come to the ER because hemoglobin was low at 6.6. Patient does have prior history of requiring blood transfusions. He states that he has had dark stools, not black but denies any bloody stools. Denies abdominal pain. He states at the beginning of last month he was having diarrhea but that has now subsided. Denies associated nausea or vomiting or urinary symptoms. Just feeling weak and fatigued. Denies any chest pain or shortness of breath. No aggravating or alleviating factors. All other systems were reviewed and are negative.      Past Medical/Surgical History:      Diagnosis Date    Allergic rhinitis 8/17/2009    Anxiety state 9/6/2007    Overview:  ICD-10 Transition    CAD (coronary artery disease)     Cataract 12/7/2007    Overview:  ICD-10 Transition    Chronic ischemic heart disease 9/24/2008    Esophageal reflux 9/6/2007    Essential hypertension 12/7/2007    Overview:  ICD-10 Transition    Glaucoma     left eye    Influenza 12/06/2017    Insomnia 9/24/2008    Other and unspecified hyperlipidemia 9/24/2008 Procedure Laterality Date    CHOLECYSTECTOMY, LAPAROSCOPIC  01/17/2018    LAPAROSCOPIC CHOLECYSTECTOMY WITH CHOLANGIOGRAM    COLONOSCOPY      total of 3, first one polyps, 2nd and 3rd no polyps    CORONARY ANGIOPLASTY Sarkis Colbert Conception    ERCP  01/16/2018    EYE SURGERY      bilateral cataracts removed, \"floaters\"    HERNIA REPAIR      bilateral inguinal hernia repairs       Medications:  Previous Medications    ACETAMINOPHEN (TYLENOL) 325 MG TABLET    Take 2 tablets by mouth every 4 hours as needed for Pain or Fever    ALPHAGAN P 0.1 % SOLN    Place 1 drop into the left eye 3 times daily     ASPIRIN 81 MG TABLET    Take 81 mg by mouth daily    ATENOLOL (TENORMIN) 25 MG TABLET    Take 50 mg by mouth daily     DORZOLAMIDE (TRUSOPT) 2 % OPHTHALMIC SOLUTION    Place 1 drop into the right eye 3 times daily     LISINOPRIL (PRINIVIL;ZESTRIL) 5 MG TABLET    Take 5 mg by mouth daily     LORATADINE (CLARITIN) 10 MG TABLET    Take 10 mg by mouth as needed    ONDANSETRON (ZOFRAN) 4 MG TABLET    Take 1 tablet by mouth every 4 hours as needed for Nausea or Vomiting    PANTOPRAZOLE (PROTONIX) 20 MG TABLET    Take 1 tablet by mouth daily    SIMVASTATIN (ZOCOR) 40 MG TABLET    Take 40 mg by mouth nightly     ZOLPIDEM (AMBIEN) 5 MG TABLET    TAKE ONE TABLET BY MOUTH EVERY NIGHT AT BEDTIME AS NEEDED     Review of Systems:  Review of Systems   Constitutional: Positive for fatigue. Negative for chills and fever. Respiratory: Negative for shortness of breath. Cardiovascular: Negative for chest pain. Gastrointestinal: Negative for abdominal pain, blood in stool, constipation, diarrhea, nausea, rectal pain and vomiting. Genitourinary: Negative for difficulty urinating, dysuria and hematuria. Skin: Negative for color change and rash. All other systems reviewed and are negative. Positives and Pertinent negatives as per HPI.   Except as noted above in the ROS, all other systems were reviewed/completed and are negative. Physical Exam:  Physical Exam  Vitals and nursing note reviewed. Constitutional:       Appearance: Normal appearance. He is well-developed. He is not toxic-appearing or diaphoretic. HENT:      Head: Normocephalic. Right Ear: External ear normal.      Left Ear: External ear normal.      Nose: Nose normal.   Eyes:      General:         Right eye: No discharge. Left eye: No discharge. Conjunctiva/sclera: Conjunctivae normal.   Cardiovascular:      Rate and Rhythm: Normal rate and regular rhythm. Heart sounds: Normal heart sounds. No murmur heard. No friction rub. No gallop. Pulmonary:      Effort: Pulmonary effort is normal. No respiratory distress. Breath sounds: Normal breath sounds. No wheezing or rales. Genitourinary:     Rectum: Guaiac result positive. Comments: Stool appears light brown in color, no alessandra blood. Hemoccult positive  Musculoskeletal:         General: Normal range of motion. Cervical back: Normal range of motion and neck supple. Skin:     General: Skin is warm and dry. Coloration: Skin is pale. Neurological:      Mental Status: He is alert and oriented to person, place, and time. Psychiatric:         Behavior: Behavior normal. Behavior is cooperative.          MEDICAL DECISION MAKING    Vitals:    Vitals:    02/01/22 1045 02/01/22 1100 02/01/22 1115 02/01/22 1130   BP: 125/63 130/77 (!) 128/57 124/69   Pulse: 57 69 59 56   Resp: 13 18 22 17   Temp:       TempSrc:       SpO2: 100% (!) 85%  100%   Weight:           LABS:   Results for orders placed or performed during the hospital encounter of 02/01/22   CBC Auto Differential   Result Value Ref Range    WBC 4.9 4.0 - 11.0 K/uL    RBC 2.40 (L) 4.20 - 5.90 M/uL    Hemoglobin 6.5 (LL) 13.5 - 17.5 g/dL    Hematocrit 21.2 (L) 40.5 - 52.5 %    MCV 88.2 80.0 - 100.0 fL    MCH 27.1 26.0 - 34.0 pg    MCHC 30.7 (L) 31.0 - 36.0 g/dL    RDW 18.2 (H) 12.4 - 15.4 % Platelets 333 244 - 618 K/uL    MPV 7.0 5.0 - 10.5 fL    Neutrophils % 76.0 %    Lymphocytes % 10.4 %    Monocytes % 11.0 %    Eosinophils % 2.2 %    Basophils % 0.4 %    Neutrophils Absolute 3.7 1.7 - 7.7 K/uL    Lymphocytes Absolute 0.5 (L) 1.0 - 5.1 K/uL    Monocytes Absolute 0.5 0.0 - 1.3 K/uL    Eosinophils Absolute 0.1 0.0 - 0.6 K/uL    Basophils Absolute 0.0 0.0 - 0.2 K/uL   Basic Metabolic Panel w/ Reflex to MG   Result Value Ref Range    Sodium 139 136 - 145 mmol/L    Potassium reflex Magnesium 3.9 3.5 - 5.1 mmol/L    Chloride 111 (H) 99 - 110 mmol/L    CO2 18 (L) 21 - 32 mmol/L    Anion Gap 10 3 - 16    Glucose 111 (H) 70 - 99 mg/dL    BUN 35 (H) 7 - 20 mg/dL    CREATININE 0.9 0.8 - 1.3 mg/dL    GFR Non-African American >60 >60    GFR African American >60 >60    Calcium 8.2 (L) 8.3 - 10.6 mg/dL   Protime-INR   Result Value Ref Range    Protime 13.2 (H) 9.9 - 12.7 sec    INR 1.16 (H) 0.88 - 1.12   APTT   Result Value Ref Range    aPTT 28.2 26.2 - 38.6 sec   Troponin   Result Value Ref Range    Troponin 0.03 (H) <0.01 ng/mL   Blood occult stool #1   Result Value Ref Range    Occult Blood Diagnostic Result: POSITIVE  Normal range: Negative   (A)    EKG 12 Lead   Result Value Ref Range    Ventricular Rate 72 BPM    Atrial Rate 72 BPM    P-R Interval 222 ms    QRS Duration 78 ms    Q-T Interval 438 ms    QTc Calculation (Bazett) 479 ms    P Axis 101 degrees    R Axis 47 degrees    T Axis 12 degrees    Diagnosis       Sinus rhythm with 1st degree A-V blockOtherwise normal ECG   TYPE AND SCREEN   Result Value Ref Range    ABO/Rh O POS     Antibody Screen NEG    PREPARE RBC (CROSSMATCH), 1 Units   Result Value Ref Range    Product Code Blood Bank [id number]     Description Blood Bank Red Blood Cells, Apheresis, Leuko-reduced     Unit Number [id number]     Dispense Status Blood Bank selected        Remainder of labs reviewed and were negative at this time or not returned at the time of this note.     RADIOLOGY: Non-plain film images suchas CT, Ultrasound and MRI are read by the radiologist. I, EVELYNE Ellington have directly visualized the radiologic plain film image(s) with the below findings:  Interpretation per the Radiologist below, if available at the time of this note:    CT CHEST WO CONTRAST   Final Result   Multiple solid nodules seen in the lower lobes, largest measuring 16 mm in   the superior segment of the right lower lobe. There are mediastinal and   hilar lymph nodes. Most of these are not pathologically enlarged. However,   there is a 15 mm lymph nodes seen in the AP window. Low-attenuation lesions in the liver. This also is suboptimally evaluated   with out contrast.  However, several of these lesions appear new. Metastatic   disease in the liver needs to be considered. There also appears to be   retroperitoneal adenopathy in the visualized portion of the superior aspect   of the abdomen. Findings in the lungs, mediastinum, visualized portion of the retroperitoneum   and the liver suggest a neoplastic process      In addition, nodularity is seen throughout the wall of the esophagus, not   evident on the previous exam.  Esophagitis could give this appearance. RECOMMENDATIONS:   Unavailable         XR CHEST PORTABLE   Final Result   New opacity seen at the left lung base indeterminate etiology. Pneumonia,   atelectasis or neoplasm are all on differential.  Recommend a follow-up   noncontrast chest CT for further evaluation. i           MEDICAL DECISION MAKING / ED COURSE:      PROCEDURES:   Procedures    Patient was given:  Medications   0.9 % sodium chloride infusion (has no administration in time range)   0.9 % sodium chloride bolus (500 mLs IntraVENous New Bag 2/1/22 1006)   pantoprazole (PROTONIX) injection 80 mg (80 mg IntraVENous Given 2/1/22 1040)   Patient presents to the emergency department with history of 10 days of fatigue and generalized weakness.   Had blood work done yesterday which revealed a hemoglobin of 6.6. Today's hemoglobin is 6.5. Previous hemoglobin of 14.3. He states that he has required blood transfusions in the past.  Denies any bloody or dark tarry stools, states that he has had some darker stools though. Guaiac is positive, stool is light brown in color. Platelets are normal.  BUN of 35 creatinine of 0.9. Troponin is elevated at 0.03. Chest x-ray was done with his fatigue which shows a new opacity seen at the left lung base, pneumonia, atelectasis or neoplasm differential, CT of chest for further evaluation has been ordered. Ordered 1 unit of blood. EKG no changes. IV bolus of Protonix given. CT imaging shows multiple solid nodules seen in lower lobes largest measuring 16 mm in the right lower lobe. Mediastinal and hilar lymph nodes. There are also lesions within the liver this is suboptimally evaluated without contrast however several of these lesions appear new. Metastatic disease of the liver is in consideration. There also appears to be retroperitoneal adenopathy and superior aspect of the abdomen. Findings within the lungs mediastinum and retroperitoneum and liver suggest neoplastic process. Discussed with hospitalist, will require admission for further care and management for blood loss anemia and concern for metastatic disease. The patient tolerated their visit well. I have evaluated the patient with physician available for consultation as needed. I have discussed the findings of today's workup with the patient and addressed the patient's questions and concerns. CLINICAL IMPRESSION:  1. Blood loss anemia    2. Elevated troponin    3. Fatigue, unspecified type    4. Multiple lung nodules on CT    5. Liver lesion    6. Mediastinal lymphadenopathy      DISPOSITION  admit    PATIENT REFERRED TO:  No follow-up provider specified.     DISCHARGE MEDICATIONS:  New Prescriptions    No medications on file              (Please note the MDM and HPI sections of this note were completed with avoice recognition program.  Efforts were made to edit the dictations but occasionally words are mis-transcribed.)    Electronically signed, EVELYNE Ellington,             EVELYNE Winkler  02/01/22 6209

## 2022-02-01 NOTE — PROGRESS NOTES
Patient called to inform this writer he has completed his oral contrast. Radiology notified; CT tentatively scheduled for 1745.

## 2022-02-01 NOTE — H&P
HOSPITALISTS HISTORY AND PHYSICAL    2/1/2022 12:26 PM    Patient Information:  Sheryl Nielson is a 78 y.o. male [id number]  PCP:  Concetta Lion MD (Tel: [phone] )    Chief complaint:    Chief Complaint   Patient presents with    Fatigue     Pt brought in by EMS from home states he had blood drawn yesterday and was told by his pcp his hgb is low and to come to ED       History of Present Illness:  Solitario Alcaraz is a 78 y.o. male having fatigue for the last 10 days no nausea vomiting abdominal pain or blood in the stool that he noticed. Patient denied any lightheadedness or dizziness no chest pain or shortness of breath. Patient had blood work done with PCP hemoglobin 6.4 test patient was sent to the ED patient has only on aspirin for blood thinner unsure if he had EGD in the past did have 3 - colonoscopies last one was 5 years ago. Patient did smoke in his 90s but since has not smoked occasional alcohol use      REVIEW OF SYSTEMS:   Constitutional: Negative for fever,chills or night sweats  ENT: Negative for rhinorrhea, epistaxis, hoarseness, sore throat. Respiratory: Negative for shortness of breath,wheezing  Cardiovascular: Negative for chest pain, palpitations   Gastrointestinal: Negative for nausea, vomiting, diarrhea  Genitourinary: Negative for polyuria, dysuria   Hematologic/Lymphatic: Negative for bleeding tendency, easy bruising  Musculoskeletal: Negative for myalgias and arthralgias  Neurologic: Negative for confusion,dysarthria. Skin: Negative for itching,rash  Psychiatric: Negative for depression,anxiety, agitation. Endocrine: Negative for polydipsia,polyuria,heat /cold intolerance.     Past Medical History:   has a past medical history of Allergic rhinitis, Anxiety state, CAD (coronary artery disease), Cataract, Chronic ischemic heart disease, Esophageal reflux, Essential hypertension, Glaucoma, Influenza, Insomnia, and Other and unspecified hyperlipidemia. Past Surgical History:   has a past surgical history that includes Coronary angioplasty with stent; eye surgery; hernia repair; Colonoscopy; ERCP (01/16/2018); and Cholecystectomy, laparoscopic (01/17/2018). Medications:  No current facility-administered medications on file prior to encounter. Current Outpatient Medications on File Prior to Encounter   Medication Sig Dispense Refill    loratadine (CLARITIN) 10 MG tablet Take 10 mg by mouth as needed      ondansetron (ZOFRAN) 4 MG tablet Take 1 tablet by mouth every 4 hours as needed for Nausea or Vomiting 15 tablet 0    pantoprazole (PROTONIX) 20 MG tablet Take 1 tablet by mouth daily 30 tablet 0    acetaminophen (TYLENOL) 325 MG tablet Take 2 tablets by mouth every 4 hours as needed for Pain or Fever 120 tablet 3    aspirin 81 MG tablet Take 81 mg by mouth daily      atenolol (TENORMIN) 25 MG tablet Take 50 mg by mouth daily       ALPHAGAN P 0.1 % SOLN Place 1 drop into the left eye 3 times daily       dorzolamide (TRUSOPT) 2 % ophthalmic solution Place 1 drop into the right eye 3 times daily       lisinopril (PRINIVIL;ZESTRIL) 5 MG tablet Take 5 mg by mouth daily       simvastatin (ZOCOR) 40 MG tablet Take 40 mg by mouth nightly       zolpidem (AMBIEN) 5 MG tablet TAKE ONE TABLET BY MOUTH EVERY NIGHT AT BEDTIME AS NEEDED         Allergies:  No Known Allergies     Social History:  Patient Lives at home   reports that he quit smoking about 33 years ago. His smoking use included cigarettes. He has a 35.00 pack-year smoking history. He has never used smokeless tobacco. He reports current alcohol use. He reports that he does not use drugs.      Family History:  family history includes Cancer in his mother; Dementia in his mother; Stroke in his father. ,     Physical Exam:  BP (!) 128/59   Pulse 61   Temp 98 °F (36.7 °C) (Oral)   Resp 23   Wt 155 lb (70.3 kg) SpO2 100%   BMI 22.24 kg/m²     General appearance:  Appears comfortable. AAOx3  HEENT: atraumatic, Pupils equal, muscous membranes moist, no masses appreciated  Cardiovascular: Regular rate and rhythm no murmurs appreciated  Respiratory: CTAB no wheezing  Gastrointestinal: Abdomen soft, non-tender, BS+  EXT: no edema  Neurology: no gross focal deficts  Psychiatry: Appropriate affect. Not agitated  Skin: Warm, dry, no rashes appreciated    Labs:  CBC:   Lab Results   Component Value Date    WBC 4.9 02/01/2022    RBC 2.40 02/01/2022    HGB 6.5 02/01/2022    HCT 21.2 02/01/2022    MCV 88.2 02/01/2022    MCH 27.1 02/01/2022    MCHC 30.7 02/01/2022    RDW 18.2 02/01/2022     02/01/2022    MPV 7.0 02/01/2022     BMP:    Lab Results   Component Value Date     02/01/2022    K 3.9 02/01/2022     02/01/2022    CO2 18 02/01/2022    BUN 35 02/01/2022    CREATININE 0.9 02/01/2022    CALCIUM 8.2 02/01/2022    GFRAA >60 02/01/2022    GFRAA >60 04/12/2013    LABGLOM >60 02/01/2022    GLUCOSE 111 02/01/2022     CT CHEST WO CONTRAST   Final Result   Multiple solid nodules seen in the lower lobes, largest measuring 16 mm in   the superior segment of the right lower lobe. There are mediastinal and   hilar lymph nodes. Most of these are not pathologically enlarged. However,   there is a 15 mm lymph nodes seen in the AP window. Low-attenuation lesions in the liver. This also is suboptimally evaluated   with out contrast.  However, several of these lesions appear new. Metastatic   disease in the liver needs to be considered. There also appears to be   retroperitoneal adenopathy in the visualized portion of the superior aspect   of the abdomen.       Findings in the lungs, mediastinum, visualized portion of the retroperitoneum   and the liver suggest a neoplastic process      In addition, nodularity is seen throughout the wall of the esophagus, not   evident on the previous exam.  Esophagitis could give this appearance. RECOMMENDATIONS:   Unavailable         XR CHEST PORTABLE   Final Result   New opacity seen at the left lung base indeterminate etiology. Pneumonia,   atelectasis or neoplasm are all on differential.  Recommend a follow-up   noncontrast chest CT for further evaluation. i             Recent imaging reviewed    Problem List  Active Problems:    Acute GI bleeding  Resolved Problems:    * No resolved hospital problems. *        Assessment/Plan:   Acute blood loss anemia secondary to acute GI bleed likely upper  - iv protonix gtt  - transfuse 1 unit prbcs  - h/h q6hrs transufe to keep >7  - gi consult    Multiple lung nodules 16mm, liver lesion former smoker  - onc and pulm consult      DVT prophylaxis scds  Code status DNR CCA yes to intubation if needed        Admit as inpatient I anticipate hospitalization spanning more than two midnights for investigation and treatment of the above medically necessary diagnoses. Please note that some part of this chart was generated using Dragon dictation software. Although every effort was made to ensure the accuracy of this automated transcription, some errors in transcription may have occurred inadvertently. If you may need any clarification, please do not hesitate to contact me through [facility].        Tonny Phalen, MD    2/1/2022 12:26 PM

## 2022-02-01 NOTE — PROGRESS NOTES
Patient has begun drinking the oral contrast for ordered CT. Patient and his wife instructed to call for nurse when he is finished drinking the contrast solution. Both v/u.

## 2022-02-02 ENCOUNTER — ANESTHESIA (OUTPATIENT)
Dept: ENDOSCOPY | Age: 82
DRG: 378 | End: 2022-02-02
Payer: MEDICARE

## 2022-02-02 VITALS
RESPIRATION RATE: 1 BRPM | OXYGEN SATURATION: 99 % | DIASTOLIC BLOOD PRESSURE: 57 MMHG | SYSTOLIC BLOOD PRESSURE: 98 MMHG

## 2022-02-02 LAB
ANION GAP SERPL CALCULATED.3IONS-SCNC: 14 MMOL/L (ref 3–16)
BUN BLDV-MCNC: 29 MG/DL (ref 7–20)
CALCIUM SERPL-MCNC: 8.2 MG/DL (ref 8.3–10.6)
CHLORIDE BLD-SCNC: 112 MMOL/L (ref 99–110)
CO2: 15 MMOL/L (ref 21–32)
CREAT SERPL-MCNC: 1 MG/DL (ref 0.8–1.3)
GFR AFRICAN AMERICAN: >60
GFR NON-AFRICAN AMERICAN: >60
GLUCOSE BLD-MCNC: 96 MG/DL (ref 70–99)
HCT VFR BLD CALC: 24.8 % (ref 40.5–52.5)
HEMOGLOBIN: 8 G/DL (ref 13.5–17.5)
LACTATE DEHYDROGENASE: 234 U/L (ref 100–190)
MAGNESIUM: 1.9 MG/DL (ref 1.8–2.4)
POTASSIUM REFLEX MAGNESIUM: 4.1 MMOL/L (ref 3.5–5.1)
SODIUM BLD-SCNC: 141 MMOL/L (ref 136–145)

## 2022-02-02 PROCEDURE — 7100000001 HC PACU RECOVERY - ADDTL 15 MIN: Performed by: INTERNAL MEDICINE

## 2022-02-02 PROCEDURE — 36415 COLL VENOUS BLD VENIPUNCTURE: CPT

## 2022-02-02 PROCEDURE — 3700000000 HC ANESTHESIA ATTENDED CARE: Performed by: INTERNAL MEDICINE

## 2022-02-02 PROCEDURE — 7100000000 HC PACU RECOVERY - FIRST 15 MIN: Performed by: INTERNAL MEDICINE

## 2022-02-02 PROCEDURE — 2580000003 HC RX 258: Performed by: REGISTERED NURSE

## 2022-02-02 PROCEDURE — 0DB58ZX EXCISION OF ESOPHAGUS, VIA NATURAL OR ARTIFICIAL OPENING ENDOSCOPIC, DIAGNOSTIC: ICD-10-PCS | Performed by: INTERNAL MEDICINE

## 2022-02-02 PROCEDURE — 2580000003 HC RX 258: Performed by: INTERNAL MEDICINE

## 2022-02-02 PROCEDURE — 83615 LACTATE (LD) (LDH) ENZYME: CPT

## 2022-02-02 PROCEDURE — 2060000000 HC ICU INTERMEDIATE R&B

## 2022-02-02 PROCEDURE — 6360000002 HC RX W HCPCS: Performed by: REGISTERED NURSE

## 2022-02-02 PROCEDURE — 3609010600 HC COLONOSCOPY POLYPECTOMY SNARE/COLD BIOPSY: Performed by: INTERNAL MEDICINE

## 2022-02-02 PROCEDURE — 83735 ASSAY OF MAGNESIUM: CPT

## 2022-02-02 PROCEDURE — 80048 BASIC METABOLIC PNL TOTAL CA: CPT

## 2022-02-02 PROCEDURE — 3609012400 HC EGD TRANSORAL BIOPSY SINGLE/MULTIPLE: Performed by: INTERNAL MEDICINE

## 2022-02-02 PROCEDURE — 3700000001 HC ADD 15 MINUTES (ANESTHESIA): Performed by: INTERNAL MEDICINE

## 2022-02-02 PROCEDURE — 85014 HEMATOCRIT: CPT

## 2022-02-02 PROCEDURE — 2500000003 HC RX 250 WO HCPCS: Performed by: REGISTERED NURSE

## 2022-02-02 PROCEDURE — 6370000000 HC RX 637 (ALT 250 FOR IP): Performed by: INTERNAL MEDICINE

## 2022-02-02 PROCEDURE — 0DBK8ZX EXCISION OF ASCENDING COLON, VIA NATURAL OR ARTIFICIAL OPENING ENDOSCOPIC, DIAGNOSTIC: ICD-10-PCS | Performed by: INTERNAL MEDICINE

## 2022-02-02 PROCEDURE — 6360000002 HC RX W HCPCS: Performed by: INTERNAL MEDICINE

## 2022-02-02 PROCEDURE — C9113 INJ PANTOPRAZOLE SODIUM, VIA: HCPCS | Performed by: INTERNAL MEDICINE

## 2022-02-02 PROCEDURE — 88305 TISSUE EXAM BY PATHOLOGIST: CPT

## 2022-02-02 PROCEDURE — 2709999900 HC NON-CHARGEABLE SUPPLY: Performed by: INTERNAL MEDICINE

## 2022-02-02 PROCEDURE — 85018 HEMOGLOBIN: CPT

## 2022-02-02 RX ORDER — PANTOPRAZOLE SODIUM 40 MG/10ML
40 INJECTION, POWDER, LYOPHILIZED, FOR SOLUTION INTRAVENOUS DAILY
Status: DISCONTINUED | OUTPATIENT
Start: 2022-02-02 | End: 2022-02-04 | Stop reason: HOSPADM

## 2022-02-02 RX ORDER — SODIUM CHLORIDE 9 MG/ML
INJECTION, SOLUTION INTRAVENOUS CONTINUOUS PRN
Status: DISCONTINUED | OUTPATIENT
Start: 2022-02-02 | End: 2022-02-02 | Stop reason: SDUPTHER

## 2022-02-02 RX ORDER — PROPOFOL 10 MG/ML
INJECTION, EMULSION INTRAVENOUS PRN
Status: DISCONTINUED | OUTPATIENT
Start: 2022-02-02 | End: 2022-02-02 | Stop reason: SDUPTHER

## 2022-02-02 RX ORDER — ATORVASTATIN CALCIUM 20 MG/1
20 TABLET, FILM COATED ORAL DAILY
Status: DISCONTINUED | OUTPATIENT
Start: 2022-02-02 | End: 2022-02-04 | Stop reason: HOSPADM

## 2022-02-02 RX ORDER — ATENOLOL 50 MG/1
50 TABLET ORAL DAILY
Status: DISCONTINUED | OUTPATIENT
Start: 2022-02-02 | End: 2022-02-04 | Stop reason: HOSPADM

## 2022-02-02 RX ORDER — LIDOCAINE HYDROCHLORIDE 20 MG/ML
INJECTION, SOLUTION EPIDURAL; INFILTRATION; INTRACAUDAL; PERINEURAL PRN
Status: DISCONTINUED | OUTPATIENT
Start: 2022-02-02 | End: 2022-02-02 | Stop reason: SDUPTHER

## 2022-02-02 RX ORDER — PROPOFOL 10 MG/ML
INJECTION, EMULSION INTRAVENOUS CONTINUOUS PRN
Status: DISCONTINUED | OUTPATIENT
Start: 2022-02-02 | End: 2022-02-02 | Stop reason: SDUPTHER

## 2022-02-02 RX ORDER — LISINOPRIL 5 MG/1
5 TABLET ORAL DAILY
Status: DISCONTINUED | OUTPATIENT
Start: 2022-02-02 | End: 2022-02-04 | Stop reason: HOSPADM

## 2022-02-02 RX ADMIN — Medication 10 ML: at 20:03

## 2022-02-02 RX ADMIN — PROPOFOL 80 MG: 10 INJECTION, EMULSION INTRAVENOUS at 10:04

## 2022-02-02 RX ADMIN — SODIUM CHLORIDE: 9 INJECTION, SOLUTION INTRAVENOUS at 09:58

## 2022-02-02 RX ADMIN — Medication 10 ML: at 00:32

## 2022-02-02 RX ADMIN — PHENYLEPHRINE HYDROCHLORIDE 100 MCG: 10 INJECTION INTRAVENOUS at 10:54

## 2022-02-02 RX ADMIN — ACETAMINOPHEN 650 MG: 325 TABLET ORAL at 19:59

## 2022-02-02 RX ADMIN — LISINOPRIL 5 MG: 5 TABLET ORAL at 20:00

## 2022-02-02 RX ADMIN — ATORVASTATIN CALCIUM 20 MG: 20 TABLET, FILM COATED ORAL at 20:00

## 2022-02-02 RX ADMIN — PANTOPRAZOLE SODIUM 40 MG: 40 INJECTION, POWDER, FOR SOLUTION INTRAVENOUS at 14:58

## 2022-02-02 RX ADMIN — PHENYLEPHRINE HYDROCHLORIDE 50 MCG: 10 INJECTION INTRAVENOUS at 10:49

## 2022-02-02 RX ADMIN — PROPOFOL 120 MCG/KG/MIN: 10 INJECTION, EMULSION INTRAVENOUS at 10:04

## 2022-02-02 RX ADMIN — ATENOLOL 50 MG: 50 TABLET ORAL at 20:00

## 2022-02-02 RX ADMIN — LIDOCAINE HYDROCHLORIDE 80 MG: 20 INJECTION, SOLUTION EPIDURAL; INFILTRATION; INTRACAUDAL; PERINEURAL at 10:04

## 2022-02-02 ASSESSMENT — PAIN SCALES - GENERAL
PAINLEVEL_OUTOF10: 0
PAINLEVEL_OUTOF10: 0
PAINLEVEL_OUTOF10: 2

## 2022-02-02 ASSESSMENT — PULMONARY FUNCTION TESTS
PIF_VALUE: 0
PIF_VALUE: 1
PIF_VALUE: 0

## 2022-02-02 ASSESSMENT — ENCOUNTER SYMPTOMS: SHORTNESS OF BREATH: 0

## 2022-02-02 NOTE — PROGRESS NOTES
Pt transferred to room 378 at this time. A&O with no signs of distress. Report given to  RN. V/u and denies questions or further needs at this time.

## 2022-02-02 NOTE — ANESTHESIA POSTPROCEDURE EVALUATION
Department of Anesthesiology  Postprocedure Note    Patient: Mayda Edwards  MRN: [id number]  YOB: 1940  Date of evaluation: 2/2/2022  Time:  12:31 PM     Procedure Summary     Date: 02/02/22 Room / Location: [address] [place] [address] / [facility]    Anesthesia Start: 7296 Anesthesia Stop: 0473    Procedures:       EGD BIOPSY (N/A Abdomen)      COLONOSCOPY POLYPECTOMY SNARE/COLD BIOPSY (N/A ) Diagnosis: (dysphagia/anemia)    Surgeons: Samson Fernandez MD Responsible Provider: Taylor Reed MD    Anesthesia Type: MAC ASA Status: 3          Anesthesia Type: MAC    Charles Phase I: Charles Score: 10    Charles Phase II:      Last vitals: Reviewed and per EMR flowsheets. Anesthesia Post Evaluation    Patient location during evaluation: PACU  Level of consciousness: awake  Airway patency: patent  Complications: no  Cardiovascular status: hemodynamically stable  Respiratory status: acceptable  There was medical reason for not using a multimodal analgesia pain management approach.

## 2022-02-02 NOTE — PROGRESS NOTES
Resting quietly in bed, finish with bowel prep, having yellow, clear,  watery stools.   SALINA brown

## 2022-02-02 NOTE — PROGRESS NOTES
Procedures on chart, colon small polyps and tics, esophagus extensive nonobstructive tumors biopsied, cardia tumor and nonbleeding ulcer, hiatal hernia  Rec; Follow up biopsies sent stat to pathology  Follow up with oncology for tumor results/treatment; I spoke with dr Windy Arvizu for very soft diet  ppi although nonbleeding ulcer is tumor related  Check mg and replete if low per primary team  I discussed results/plan with patient  Otherwise will sign off, call if needed.

## 2022-02-02 NOTE — PROGRESS NOTES
Pt stable and able to be transferred from PACU to room 378, awaiting  to speak with pt. Pt A&O , VSS, with no complaints at this time. 3T RN called, report given and notified that pt will be being transferred out of PACU and to room.

## 2022-02-02 NOTE — PROGRESS NOTES
Pt arrived from endo to PACU bay 4. Reported received from endo staff. Pt arousable to voice Pt on 4L simple mask, SR with PVCs, and VSS. Will continue to monitor.

## 2022-02-02 NOTE — PROGRESS NOTES
Awake, NPO except for bowel prep, tolerating well. Up to bathroom frequently, gait weak but steady. Denies having any pain, no nausea, no SOB.  VSS. Assessment completed, see flow charts. No distress noted.  kevin

## 2022-02-02 NOTE — PROGRESS NOTES
Patient with approximately two minutes of SVT while up to use bathroom. Completely asymptomatic and returned to [facility] when he ambulated back to bed.  Secure message sent to Dr Lugenia Halsted.

## 2022-02-02 NOTE — ANESTHESIA PRE PROCEDURE
Department of Anesthesiology  Preprocedure Note       Name:  Kaitlin Bland   Age:  78 y.o.  :  1940                                          MRN:  [id number]         Date:  2022      Surgeon: Mimi Sheridan):  Simon Zavala MD    Procedure: Procedure(s):  EGD DIAGNOSTIC ONLY  COLONOSCOPY DIAGNOSTIC    Medications prior to admission:   Prior to Admission medications    Medication Sig Start Date End Date Taking? Authorizing Provider   loratadine (CLARITIN) 10 MG tablet Take 10 mg by mouth as needed    Historical Provider, MD   ondansetron (ZOFRAN) 4 MG tablet Take 1 tablet by mouth every 4 hours as needed for Nausea or Vomiting 18   Landon Crawford MD   pantoprazole (PROTONIX) 20 MG tablet Take 1 tablet by mouth daily 18   Eliane Huynh MD   acetaminophen (TYLENOL) 325 MG tablet Take 2 tablets by mouth every 4 hours as needed for Pain or Fever 17   Eliane Huynh MD   aspirin 81 MG tablet Take 81 mg by mouth daily    Historical Provider, MD   atenolol (TENORMIN) 25 MG tablet Take 50 mg by mouth daily  10/15/17   Historical Provider, MD   ALPHAGAN P 0.1 % SOLN Place 1 drop into the left eye 3 times daily  17   Historical Provider, MD   dorzolamide (TRUSOPT) 2 % ophthalmic solution Place 1 drop into the right eye 3 times daily  3/6/16   Historical Provider, MD   lisinopril (PRINIVIL;ZESTRIL) 5 MG tablet Take 5 mg by mouth daily  10/15/17   Historical Provider, MD   simvastatin (ZOCOR) 40 MG tablet Take 40 mg by mouth nightly  17   Historical Provider, MD   zolpidem (AMBIEN) 5 MG tablet TAKE ONE TABLET BY MOUTH EVERY NIGHT AT BEDTIME AS NEEDED 10/25/16   Historical Provider, MD       Current medications:    No current facility-administered medications for this visit. No current outpatient medications on file.      Facility-Administered Medications Ordered in Other Visits   Medication Dose Route Frequency Provider Last Rate Last Admin    0.9 % sodium chloride infusion IntraVENous PRN EVELYNE Winkler        sodium chloride flush 0.9 % injection 5-40 mL  5-40 mL IntraVENous 2 times per day Will MD Shirley   10 mL at 02/02/22 0032    sodium chloride flush 0.9 % injection 5-40 mL  5-40 mL IntraVENous PRN Will MD Shirley        0.9 % sodium chloride infusion  25 mL IntraVENous PRN Will MD Shirley        ondansetron (ZOFRAN-ODT) disintegrating tablet 4 mg  4 mg Oral Q8H PRN Will MD Shirley        Or    ondansetron (ZOFRAN) injection 4 mg  4 mg IntraVENous Q6H PRN Will MD Shirley        polyethylene glycol (GLYCOLAX) packet 17 g  17 g Oral Daily PRN Will MD Shirley        acetaminophen (TYLENOL) tablet 650 mg  650 mg Oral Q6H PRN Will MD Shirley        Or   [employer] acetaminophen (TYLENOL) suppository 650 mg  650 mg Rectal Q6H PRN Will MD Shirley           Allergies:  No Known Allergies    Problem List:    Patient Active Problem List   Diagnosis Code    Chronic ischemic heart disease I25.9    Cataract H26.9    Allergic rhinitis J30.9    Essential hypertension I10    Esophageal reflux K21.9    Other and unspecified hyperlipidemia E78.5    Insomnia G47.00    Glaucoma H40.9    Anxiety state F41.1    SVT (supraventricular tachycardia) (HCC) I47.1    Coronary artery disease involving native coronary artery of native heart without angina pectoris I25.10    Ascending cholangitis K83.09    Acute pancreatitis K85.90    Acute gallstone pancreatitis K85.10    Cholecystitis K81.9    Abnormal LFTs R94.5    Acute GI bleeding K92.2       Past Medical History:        Diagnosis Date    Allergic rhinitis 8/17/2009    Anxiety state 9/6/2007    Overview:  ICD-10 Transition    CAD (coronary artery disease)     Cataract 12/7/2007    Overview:  ICD-10 Transition    Chronic ischemic heart disease 9/24/2008    Esophageal reflux 9/6/2007    Essential hypertension 12/7/2007    Overview:  ICD-10 Transition    Glaucoma     left eye    Influenza 2017    Insomnia 2008    Other and unspecified hyperlipidemia 2008       Past Surgical History:        Procedure Laterality Date    CHOLECYSTECTOMY, LAPAROSCOPIC  2018    LAPAROSCOPIC CHOLECYSTECTOMY WITH CHOLANGIOGRAM    COLONOSCOPY      total of 3, first one polyps, 2nd and 3rd no polyps   [address]      Dr Lacey Walter ERCP  2018    EYE SURGERY      bilateral cataracts removed, \"floaters\"    HERNIA REPAIR      bilateral inguinal hernia repairs       Social History:    Social History     Tobacco Use    Smoking status: Former Smoker     Packs/day: 1.00     Years: 35.00     Pack years: 35.00     Types: Cigarettes     Quit date: 1988     Years since quittin.4    Smokeless tobacco: Never Used   Substance Use Topics    Alcohol use: Yes     Comment: beer occasionally                                Counseling given: Not Answered      Vital Signs (Current): There were no vitals filed for this visit.                                            BP Readings from Last 3 Encounters:   22 (!) 135/55   21 134/74   18 120/80       NPO Status:                                                                                 BMI:   Wt Readings from Last 3 Encounters:   22 156 lb (70.8 kg)   21 165 lb (74.8 kg)   18 176 lb (79.8 kg)     There is no height or weight on file to calculate BMI.    CBC:   Lab Results   Component Value Date    WBC 4.9 2022    RBC 2.40 2022    HGB 7.3 2022    HCT 23.7 2022    MCV 88.2 2022    RDW 18.2 2022     2022       CMP:   Lab Results   Component Value Date     2022    K 4.1 2022     2022    CO2 15 2022    BUN 29 2022    CREATININE 1.0 2022    GFRAA >60 2022    GFRAA >60 2013    AGRATIO 1.1 2021    LABGLOM >60 2022    GLUCOSE 96 2022    PROT 5.6 2022    PROT 7.0 03/09/2012    CALCIUM 8.2 02/02/2022    BILITOT 0.6 02/01/2022    ALKPHOS 91 02/01/2022    AST 17 02/01/2022    ALT 11 02/01/2022       POC Tests: No results for input(s): POCGLU, POCNA, POCK, POCCL, POCBUN, POCHEMO, POCHCT in the last 72 hours. Coags:   Lab Results   Component Value Date    PROTIME 13.2 02/01/2022    INR 1.16 02/01/2022    APTT 28.2 02/01/2022       HCG (If Applicable): No results found for: PREGTESTUR, PREGSERUM, HCG, HCGQUANT     ABGs: No results found for: PHART, PO2ART, ZZP1JPC, ZBG0POY, BEART, V2ZNBJQT     Type & Screen (If Applicable):  No results found for: LABABO, LABRH    Drug/Infectious Status (If Applicable):  No results found for: HIV, HEPCAB    COVID-19 Screening (If Applicable):   Lab Results   Component Value Date    COVID19 Not Detected 02/01/2022           Anesthesia Evaluation  Patient summary reviewed and Nursing notes reviewed no history of anesthetic complications:   Airway: Mallampati: II  TM distance: >3 FB   Neck ROM: full  Mouth opening: > = 3 FB Dental: normal exam         Pulmonary:       (-) asthma and shortness of breath                           Cardiovascular:    (+) hypertension:, CAD:, CABG/stent: no interval change, dysrhythmias (hx SVT):,     (-)  angina                Neuro/Psych:      (-) CVA           GI/Hepatic/Renal:   (+) GERD:,      (-) liver disease      ROS comment: GI Bleed. Endo/Other:        (-) diabetes mellitus, hypothyroidism               Abdominal:             Vascular:     - PVD. Other Findings:             Anesthesia Plan      MAC     ASA 3       Induction: intravenous. Anesthetic plan and risks discussed with patient. Plan discussed with CRNA.                 Debbie Barron MD   2/2/2022

## 2022-02-02 NOTE — PROGRESS NOTES
[facility]    2/2/2022 2:29 PM        Name: Ernie Glez . Admitted: 2/1/2022  Primary Care Provider: Severo Reagin, MD (Tel: [phone])          Subjective:    Bed no chest pain shortness breath fevers or chills    Reviewed interval ancillary notes    Current Medications  pantoprazole (PROTONIX) injection 40 mg, Daily  0.9 % sodium chloride infusion, PRN  sodium chloride flush 0.9 % injection 5-40 mL, 2 times per day  sodium chloride flush 0.9 % injection 5-40 mL, PRN  0.9 % sodium chloride infusion, PRN  ondansetron (ZOFRAN-ODT) disintegrating tablet 4 mg, Q8H PRN   Or  ondansetron (ZOFRAN) injection 4 mg, Q6H PRN  polyethylene glycol (GLYCOLAX) packet 17 g, Daily PRN  acetaminophen (TYLENOL) tablet 650 mg, Q6H PRN   Or  acetaminophen (TYLENOL) suppository 650 mg, Q6H PRN        Objective:  /68   Pulse 59   Temp 97 °F (36.1 °C) (Infrared)   Resp 16   Ht 5' 10\" (1.778 m)   Wt 156 lb (70.8 kg)   SpO2 93%   BMI 22.38 kg/m²     Intake/Output Summary (Last 24 hours) at 2/2/2022 1429  Last data filed at 2/2/2022 1135  Gross per 24 hour   Intake 550 ml   Output    Net 550 ml      Wt Readings from Last 3 Encounters:   02/02/22 156 lb (70.8 kg)   04/28/21 165 lb (74.8 kg)   01/23/18 176 lb (79.8 kg)       General appearance:  Appears comfortable. AAOx3  HEENT: atraumatic, Pupils equal, muscous membranes moist, no masses appreciated  Cardiovascular: Regular rate and rhythm no murmurs appreciated  Respiratory: CTAB no wheezing  Gastrointestinal: Abdomen soft, non-tender, BS+  EXT: no edema  Neurology: no gross focal deficts  Psychiatry: Appropriate affect.  Not agitated  Skin: Warm, dry, no rashes appreciated    Labs and Tests:  CBC:   Recent Labs     02/01/22  0940 02/01/22  1427   WBC 4.9  --    HGB 6.5* 7.3*     --      BMP:    Recent Labs     02/01/22  0940 02/02/22  0542    794 K 3.9 4.1   * 112*   CO2 18* 15*   BUN 35* 29*   CREATININE 0.9 1.0   GLUCOSE 111* 96     Hepatic:   Recent Labs     02/01/22  1427   AST 17   ALT 11   BILITOT 0.6   ALKPHOS 80     CT ABDOMEN PELVIS W IV CONTRAST Additional Contrast? Oral   Final Result   Findings consistent with diffuse metastatic disease including multiple   pulmonary masses, mediastinal lymphadenopathy, right hilar lymphadenopathy,   upper abdominal lymphadenopathy and retroperitoneal lymphadenopathy as well   as numerous hepatic masses. Abdomen findings are new since prior 04/28/2021,   CT. See separate chest CT report for more detailed discussion of lower chest   findings. CT CHEST WO CONTRAST   Final Result   Multiple solid nodules seen in the lower lobes, largest measuring 16 mm in   the superior segment of the right lower lobe. There are mediastinal and   hilar lymph nodes. Most of these are not pathologically enlarged. However,   there is a 15 mm lymph nodes seen in the AP window. Low-attenuation lesions in the liver. This also is suboptimally evaluated   with out contrast.  However, several of these lesions appear new. Metastatic   disease in the liver needs to be considered. There also appears to be   retroperitoneal adenopathy in the visualized portion of the superior aspect   of the abdomen. Findings in the lungs, mediastinum, visualized portion of the retroperitoneum   and the liver suggest a neoplastic process      In addition, nodularity is seen throughout the wall of the esophagus, not   evident on the previous exam.  Esophagitis could give this appearance. RECOMMENDATIONS:   Unavailable         XR CHEST PORTABLE   Final Result   New opacity seen at the left lung base indeterminate etiology. Pneumonia,   atelectasis or neoplasm are all on differential.  Recommend a follow-up   noncontrast chest CT for further evaluation.  i             Recent imaging reviewed    Problem List  Active Problems: Acute GI bleeding  Resolved Problems:    * No resolved hospital problems.  *       Assessment/Plan:   Acute blood loss anemia secondary to acute GI bleed likely upper, s/p 1 units prbcs  - iv protonix gtt  - h/h q6hrs transufe to keep >7  - egd today     Multiple lung nodules 16mm, liver lesion former smoker  - onc and pulm on board        DVT prophylaxis scds  Code status DNR CCA yes to intubation if needed    Joy Ellis MD   2/2/2022 2:29 PM

## 2022-02-02 NOTE — PROGRESS NOTES
-- DO NOT REPLY / DO NOT REPLY ALL --  -- Message is from the [facility]--    General Patient Message      Reason for Call: Patient called to return attempt to call. Please follow up with patient after 6 pm at patient preference.    Caller Information       Type Contact Phone    02/05/2020 12:27 PM Phone (Incoming) Bernard Solis (Self) [phone] (M)     pt wants to discuss possibly decresing anafranil due to getting sweats all the time and ED-pt of Dr. Cain needs to speak with a md-          Alternative phone number: na    Turnaround time given to caller:   \"This message will be sent to [state Provider's name]. The clinical team will fulfill your request as soon as they review your message.\"}     Dr. Vitaliy Plata bedside to speak with pt.

## 2022-02-03 LAB
ANION GAP SERPL CALCULATED.3IONS-SCNC: 10 MMOL/L (ref 3–16)
BASOPHILS ABSOLUTE: 0 K/UL (ref 0–0.2)
BASOPHILS RELATIVE PERCENT: 0.3 %
BUN BLDV-MCNC: 27 MG/DL (ref 7–20)
CALCIUM SERPL-MCNC: 8.4 MG/DL (ref 8.3–10.6)
CHLORIDE BLD-SCNC: 113 MMOL/L (ref 99–110)
CO2: 18 MMOL/L (ref 21–32)
CREAT SERPL-MCNC: 1 MG/DL (ref 0.8–1.3)
EOSINOPHILS ABSOLUTE: 0.1 K/UL (ref 0–0.6)
EOSINOPHILS RELATIVE PERCENT: 1.3 %
GFR AFRICAN AMERICAN: >60
GFR NON-AFRICAN AMERICAN: >60
GLUCOSE BLD-MCNC: 102 MG/DL (ref 70–99)
HCT VFR BLD CALC: 22.6 % (ref 40.5–52.5)
HCT VFR BLD CALC: 24 % (ref 40.5–52.5)
HCT VFR BLD CALC: 24.4 % (ref 40.5–52.5)
HEMOGLOBIN: 7.3 G/DL (ref 13.5–17.5)
HEMOGLOBIN: 7.5 G/DL (ref 13.5–17.5)
HEMOGLOBIN: 7.7 G/DL (ref 13.5–17.5)
LYMPHOCYTES ABSOLUTE: 0.8 K/UL (ref 1–5.1)
LYMPHOCYTES RELATIVE PERCENT: 11 %
MCH RBC QN AUTO: 26.7 PG (ref 26–34)
MCHC RBC AUTO-ENTMCNC: 32.3 G/DL (ref 31–36)
MCV RBC AUTO: 82.4 FL (ref 80–100)
MONOCYTES ABSOLUTE: 0.7 K/UL (ref 0–1.3)
MONOCYTES RELATIVE PERCENT: 10.1 %
NEUTROPHILS ABSOLUTE: 5.7 K/UL (ref 1.7–7.7)
NEUTROPHILS RELATIVE PERCENT: 77.3 %
PDW BLD-RTO: 18.1 % (ref 12.4–15.4)
PLATELET # BLD: 244 K/UL (ref 135–450)
PMV BLD AUTO: 7.1 FL (ref 5–10.5)
POTASSIUM REFLEX MAGNESIUM: 4 MMOL/L (ref 3.5–5.1)
RBC # BLD: 2.74 M/UL (ref 4.2–5.9)
SODIUM BLD-SCNC: 141 MMOL/L (ref 136–145)
WBC # BLD: 7.4 K/UL (ref 4–11)

## 2022-02-03 PROCEDURE — 85018 HEMOGLOBIN: CPT

## 2022-02-03 PROCEDURE — 97161 PT EVAL LOW COMPLEX 20 MIN: CPT

## 2022-02-03 PROCEDURE — 6360000002 HC RX W HCPCS: Performed by: INTERNAL MEDICINE

## 2022-02-03 PROCEDURE — 85025 COMPLETE CBC W/AUTO DIFF WBC: CPT

## 2022-02-03 PROCEDURE — 97530 THERAPEUTIC ACTIVITIES: CPT

## 2022-02-03 PROCEDURE — C9113 INJ PANTOPRAZOLE SODIUM, VIA: HCPCS | Performed by: INTERNAL MEDICINE

## 2022-02-03 PROCEDURE — 97535 SELF CARE MNGMENT TRAINING: CPT

## 2022-02-03 PROCEDURE — 36415 COLL VENOUS BLD VENIPUNCTURE: CPT

## 2022-02-03 PROCEDURE — 2060000000 HC ICU INTERMEDIATE R&B

## 2022-02-03 PROCEDURE — 99233 SBSQ HOSP IP/OBS HIGH 50: CPT | Performed by: INTERNAL MEDICINE

## 2022-02-03 PROCEDURE — 80048 BASIC METABOLIC PNL TOTAL CA: CPT

## 2022-02-03 PROCEDURE — 6370000000 HC RX 637 (ALT 250 FOR IP): Performed by: INTERNAL MEDICINE

## 2022-02-03 PROCEDURE — 85014 HEMATOCRIT: CPT

## 2022-02-03 PROCEDURE — 2580000003 HC RX 258: Performed by: INTERNAL MEDICINE

## 2022-02-03 PROCEDURE — 97165 OT EVAL LOW COMPLEX 30 MIN: CPT

## 2022-02-03 RX ADMIN — Medication 10 ML: at 21:25

## 2022-02-03 RX ADMIN — ATORVASTATIN CALCIUM 20 MG: 20 TABLET, FILM COATED ORAL at 09:41

## 2022-02-03 RX ADMIN — ATENOLOL 50 MG: 50 TABLET ORAL at 09:41

## 2022-02-03 RX ADMIN — LISINOPRIL 5 MG: 5 TABLET ORAL at 09:41

## 2022-02-03 RX ADMIN — PANTOPRAZOLE SODIUM 40 MG: 40 INJECTION, POWDER, FOR SOLUTION INTRAVENOUS at 09:41

## 2022-02-03 RX ADMIN — Medication 10 ML: at 09:41

## 2022-02-03 NOTE — PLAN OF CARE
Pt remains free from falls and physical injury at this time. Bed alarm engaged. Pt at baseline functional status. VSS on room air at this time. Pt updated on plan of care. See flowsheet for assessment.    Problem: Falls - Risk of:  Goal: Will remain free from falls  Description: Will remain free from falls  Outcome: Ongoing  Goal: Absence of physical injury  Description: Absence of physical injury  Outcome: Ongoing     Problem: Musculor/Skeletal Functional Status  Goal: Highest potential functional level  Outcome: Ongoing  Goal: Absence of falls  Outcome: Ongoing

## 2022-02-03 NOTE — PROGRESS NOTES
[facility] PROGRESS NOTE    2/3/2022 9:16 AM        Name: Muna Street . Admitted: 2/1/2022  Primary Care Provider: Chelsi Tam MD (Tel: [phone])          Subjective:    Lying bed no chest pain shortness breath fevers or chills    Reviewed interval ancillary notes    Current Medications  pantoprazole (PROTONIX) injection 40 mg, Daily  atenolol (TENORMIN) tablet 50 mg, Daily  lisinopril (PRINIVIL;ZESTRIL) tablet 5 mg, Daily  atorvastatin (LIPITOR) tablet 20 mg, Daily  0.9 % sodium chloride infusion, PRN  sodium chloride flush 0.9 % injection 5-40 mL, 2 times per day  sodium chloride flush 0.9 % injection 5-40 mL, PRN  0.9 % sodium chloride infusion, PRN  ondansetron (ZOFRAN-ODT) disintegrating tablet 4 mg, Q8H PRN   Or  ondansetron (ZOFRAN) injection 4 mg, Q6H PRN  polyethylene glycol (GLYCOLAX) packet 17 g, Daily PRN  acetaminophen (TYLENOL) tablet 650 mg, Q6H PRN   Or  acetaminophen (TYLENOL) suppository 650 mg, Q6H PRN        Objective:  /68   Pulse 66   Temp 98.2 °F (36.8 °C) (Oral)   Resp 18   Ht 5' 10\" (1.778 m)   Wt 156 lb (70.8 kg)   SpO2 97%   BMI 22.38 kg/m²     Intake/Output Summary (Last 24 hours) at 2/3/2022 0916  Last data filed at 2/2/2022 1135  Gross per 24 hour   Intake 550 ml   Output    Net 550 ml      Wt Readings from Last 3 Encounters:   02/02/22 156 lb (70.8 kg)   04/28/21 165 lb (74.8 kg)   01/23/18 176 lb (79.8 kg)       General appearance:  Appears comfortable. AAOx3  HEENT: atraumatic, Pupils equal, muscous membranes moist, no masses appreciated  Cardiovascular: Regular rate and rhythm no murmurs appreciated  Respiratory: CTAB no wheezing  Gastrointestinal: Abdomen soft, non-tender, BS+  EXT: no edema  Neurology: no gross focal deficts  Psychiatry: Appropriate affect.  Not agitated  Skin: Warm, dry, no rashes appreciated    Labs and Tests:  CBC:   Recent Labs 02/01/22  0940 02/01/22  0940 02/01/22  1427 02/02/22  1818 02/03/22  0635   WBC 4.9  --   --   --  7.4   HGB 6.5*   < > 7.3* 8.0* 7.3*     --   --   --  244    < > = values in this interval not displayed. BMP:    Recent Labs     02/01/22  0940 02/02/22  0542 02/03/22  0635    141 141   K 3.9 4.1 4.0   * 112* 113*   CO2 18* 15* 18*   BUN 35* 29* 27*   CREATININE 0.9 1.0 1.0   GLUCOSE 111* 96 102*     Hepatic:   Recent Labs     02/01/22  1427   AST 17   ALT 11   BILITOT 0.6   ALKPHOS 80     CT ABDOMEN PELVIS W IV CONTRAST Additional Contrast? Oral   Final Result   Findings consistent with diffuse metastatic disease including multiple   pulmonary masses, mediastinal lymphadenopathy, right hilar lymphadenopathy,   upper abdominal lymphadenopathy and retroperitoneal lymphadenopathy as well   as numerous hepatic masses. Abdomen findings are new since prior 04/28/2021,   CT. See separate chest CT report for more detailed discussion of lower chest   findings. CT CHEST WO CONTRAST   Final Result   Multiple solid nodules seen in the lower lobes, largest measuring 16 mm in   the superior segment of the right lower lobe. There are mediastinal and   hilar lymph nodes. Most of these are not pathologically enlarged. However,   there is a 15 mm lymph nodes seen in the AP window. Low-attenuation lesions in the liver. This also is suboptimally evaluated   with out contrast.  However, several of these lesions appear new. Metastatic   disease in the liver needs to be considered. There also appears to be   retroperitoneal adenopathy in the visualized portion of the superior aspect   of the abdomen. Findings in the lungs, mediastinum, visualized portion of the retroperitoneum   and the liver suggest a neoplastic process      In addition, nodularity is seen throughout the wall of the esophagus, not   evident on the previous exam.  Esophagitis could give this appearance.

## 2022-02-03 NOTE — PROGRESS NOTES
Physical Therapy    Facility/Department: [address]  Initial Assessment/DischargeSummary     NAME: Asad Leong  : 1940  MRN: [id number]    Date of Service: 2/3/2022    Discharge Recommendations:  Asad Leong scored a 24/24 on the AM-PAC short mobility form. At this time, no further PT is recommended upon discharge due to independent function. Recommend patient returns to prior setting with prior services. Home independently   PT Equipment Recommendations  Equipment Needed: No    Assessment   Assessment: Pt presenting close to his baseline. Slightly weaker due to immobility from hospitalization and lower blood levels. Still independent and safe with mobility. No further PT needs at this time. Prognosis: Good  Decision Making: Low Complexity  PT Education: PT Role  Patient Education: verbalized understanding  Barriers to Learning: hearing  REQUIRES PT FOLLOW UP: No  Activity Tolerance  Activity Tolerance: Patient Tolerated treatment well       Patient Diagnosis(es): The primary encounter diagnosis was Blood loss anemia. Diagnoses of Elevated troponin, Fatigue, unspecified type, Multiple lung nodules on CT, Liver lesion, and Mediastinal lymphadenopathy were also pertinent to this visit. has a past medical history of Allergic rhinitis, Anxiety state, CAD (coronary artery disease), Cataract, Chronic ischemic heart disease, Esophageal reflux, Essential hypertension, Glaucoma, Influenza, Insomnia, and Other and unspecified hyperlipidemia. has a past surgical history that includes Coronary angioplasty with stent; eye surgery; hernia repair; Colonoscopy; ERCP (2018); Cholecystectomy, laparoscopic (2018); Upper gastrointestinal endoscopy (N/A, 2022); and Colonoscopy (N/A, 2022). Restrictions  Restrictions/Precautions  Restrictions/Precautions: Fall Risk (med fall risk)  Position Activity Restriction  Other position/activity restrictions: pt admitted with low hgb. colonoscopy and EGD done.  biopsy taken from mass found  Vision/Hearing  Vision: Impaired  Vision Exceptions: Wears glasses at all times  Hearing: Exceptions to [facility]  Hearing Exceptions: Bilateral hearing aid;Hard of hearing/hearing concerns     Subjective  General  Chart Reviewed: Yes  Family / Caregiver Present: No  Diagnosis: acute GI bleed  General Comment  Comments: supine in bed at arrival  Subjective  Subjective: pleasant and agreeable to therapy evaluation  Pain Screening  Patient Currently in Pain: Denies  Vital Signs  Patient Currently in Pain: Denies       Orientation  Orientation  Overall Orientation Status: Within Functional Limits  Social/Functional History  Social/Functional History  Lives With: Spouse  Type of Home: House  Home Layout: One level,Laundry in basement,Work area in basement  Home Access: Stairs to enter with rails  Entrance Stairs - Number of Steps: 12 steps from garage entry  [address] Shower/Tub: Tub/Shower unit  [employer]: Standard  Bathroom Equipment: Grab bars in shower,Hand-held shower  ADL Assistance: Independent  Homemaking Responsibilities: Yes  Ambulation Assistance: Independent  Transfer Assistance: Independent  Active : Yes  Occupation: Retired  Leisure & Hobbies: dance with wife  Additional Comments: no falls reported  Cognition        Objective     Observation/Palpation  Posture: Good    AROM RLE (degrees)  RLE AROM: WFL  AROM LLE (degrees)  LLE AROM : WFL  Strength RLE  Strength RLE: WFL  Strength LLE  Strength LLE: WFL        Bed mobility  Supine to Sit: Modified independent  Sit to Supine: Modified independent  Scooting: Modified independent  Comment: HOB slighlty elevated  Transfers  Sit to Stand: Supervision  Stand to sit: Supervision  Comment: transfer from EOB x 3 and commode  Ambulation  Ambulation?: Yes  Ambulation 1  Surface: level tile  Device: No Device  Quality of Gait: mildly unsteady at first but no significant LOB, improved over time  Gait Deviations: Slow Sasha  Distance: 400 ft  Stairs/Curb  Stairs?: Yes  Stairs  # Steps : 12  Stairs Height: 6\"  Rails: Right ascending  Assistance: Supervision  Comment: slow but safe, reciprocal pattern     Balance  Sitting - Static: Good  Sitting - Dynamic: Good  Standing - Static: Good  Standing - Dynamic: Good  Comments: Stood for LB dressing with grab bar and grooming tasks. 15 minutes of standing.         Plan   Plan  Times per week: eval and dc  Safety Devices  Type of devices: Call light within reach,Left in bed,Nurse notified    G-Code       OutComes Score                                                  AM-PAC Score  AM-PAC Inpatient Mobility Raw Score : 24 (02/03/22 1143)  AM-PAC Inpatient T-Scale Score : 61.14 (02/03/22 1143)  Mobility Inpatient CMS 0-100% Score: 0 (02/03/22 1143)  Mobility Inpatient CMS G-Code Modifier : [address] (02/03/22 1143)          Goals  Short term goals  Time Frame for Short term goals: no acute goals       Therapy Time   Individual Concurrent Group Co-treatment   Time In 1046         Time Out 1143         Minutes 57         Timed Code Treatment Minutes: 5664  [facility], [id number]

## 2022-02-03 NOTE — PROGRESS NOTES
PULMONARY AND CRITICAL CARE MEDICINE PROGRESS NOTE      SUBJECTIVE: No acute events overnight. REVIEW OF SYSTEMS: 8 point ROS is negative beside mentioned in [address] CONSTITUTIONAL SYMPTOMS: The patient denies fever, fatigue, night sweats, weight loss or weight gain. HEENT: No vision changes. No tinnitus, Denies sinus pain. No hoarseness, or dysphagia. CARDIOVASCULAR: Denies chest pain, palpitation, syncope. RESPIRATORY: Denies shortness of breath or cough. GASTROINTESTINAL: Denies nausea, abdominal pain or change in bowel function. SKIN: No rashes or itching. MUSCULOSKELETAL: Denies weakness or bone pain. NEUROLOGICAL: No headaches or seizures. MEDICATIONS:      pantoprazole  40 mg IntraVENous Daily    atenolol  50 mg Oral Daily    lisinopril  5 mg Oral Daily    atorvastatin  20 mg Oral Daily    sodium chloride flush  5-40 mL IntraVENous 2 times per day      sodium chloride      sodium chloride       sodium chloride, sodium chloride flush, sodium chloride, ondansetron **OR** ondansetron, polyethylene glycol, acetaminophen **OR** acetaminophen     ALLERGIES:   Allergies as of 02/01/2022    (No Known Allergies)        OBJECTIVE:   height is 5' 10\" (1.778 m) and weight is 156 lb (70.8 kg). His temporal temperature is 97 °F (36.1 °C). His blood pressure is 102/57 (abnormal) and his pulse is 68. His respiration is 16 and oxygen saturation is 99%. No intake/output data recorded. PHYSICAL EXAM:  CONSTITUTIONAL: He is a 78y.o.-year-old who appears his stated age. He is alert and oriented x 3 and in no acute distress. CARDIOVASCULAR: S1 S2 RRR. Without murmer  RESPIRATORY & CHEST: Lungs are clear to auscultation and percussion. No wheezing, no crackles. Good air movement  GASTROINTESTINAL & ABDOMEN: Soft, nontender, positive bowel sounds in all quadrants, non-distended, without hepatosplenomegaly. GENITOURINARY: Deferred.    MUSCULOSKELETAL: No tenderness to palpation of the axial skeleton. There is no clubbing. No cyanosis. No edema of the lower extremities. SKIN OF BODY: No rash or jaundice. PSYCHIATRIC EVALUATION: Normal affect. Patient answers questions appropriately. HEMATOLOGIC/LYMPHATIC/ IMMUNOLOGIC: No palpable lymphadenopathy. NEUROLOGIC: Alert and oriented x 3. Groslly non-focal. Motor strength is 5+/5 in all muscle groups. The patient has a normal sensorium globally. LABS:   Lab Results   Component Value Date    WBC 7.4 02/03/2022    HGB 7.7 (L) 02/03/2022    HCT 24.0 (L) 02/03/2022     02/03/2022    CHOL 85 10/01/2020    TRIG 94 10/01/2020    HDL 41 10/01/2020    ALT 11 02/01/2022    AST 17 02/01/2022     02/03/2022    K 4.0 02/03/2022     (H) 02/03/2022    CREATININE 1.0 02/03/2022    BUN 27 (H) 02/03/2022    CO2 18 (L) 02/03/2022    INR 1.16 (H) 02/01/2022       Lab Results   Component Value Date    GLUCOSE 102 (H) 02/03/2022    CALCIUM 8.4 02/03/2022     02/03/2022    K 4.0 02/03/2022    CO2 18 (L) 02/03/2022     (H) 02/03/2022    BUN 27 (H) 02/03/2022    CREATININE 1.0 02/03/2022       Lab Results   Component Value Date    GLUCOSE 102 (H) 02/03/2022    CALCIUM 8.4 02/03/2022     02/03/2022    K 4.0 02/03/2022    CO2 18 (L) 02/03/2022     (H) 02/03/2022    BUN 27 (H) 02/03/2022    CREATININE 1.0 02/03/2022       IMPRESSION:   Bilateral pulmonary nodules  Mediastinal hilar lymphadenopathy  Dilated esophagus  Hepatic lesions  Upper GI bleed  Acute blood loss anemia        RECOMMENDATION:   Patient presents with likely upper GI bleed with low hemoglobin. Imaging studies showed bilateral multiple lung nodules likely metastatic along with small mediastinal and hilar lymphadenopathy. Esophagus was dilated with some liver lesions. Findings highly suggestive of malignancy, likely GI origin. Underwent EGD yesterday that showed multiple mass lesions in esophagus which were biopsied. Results are pending.   I do not think that patient would require any further biopsies. Pulmonary will sign off. Oracio Eldridge MD  Pulmonary Critical Care and Sleep Medicine  2/3/2022, 12:36 PM    This note was completed using dragon medical speech recognition software. Grammatical errors, random word insertions, pronoun errors and incomplete sentences are occasional consequences of this technology due to software limitations. If there are questions or concerns about the content of this note of information contained within the body of this dictation they should be addressed with the provider for clarification.

## 2022-02-03 NOTE — PROGRESS NOTES
Occupational Therapy   Occupational Therapy Initial Assessment  Date: 2/3/2022   Patient Name: Monica Troncoso  MRN: [id number]     : 1940    Date of Service: 2/3/2022    Discharge Recommendations:  Monica Troncoso scored a 20/24 on the AM-PAC ADL Inpatient form. At this time, no further OT is recommended upon discharge due to pt being close to baseline. Recommend patient returns to prior setting with prior services. OT Equipment Recommendations  Equipment Needed: No    Assessment   Assessment: eval and d/c. pt close to baseline, wife to assist pt upon d/c  Treatment Diagnosis: GI bleed  Prognosis: Good  Decision Making: Low Complexity  Patient Education: eval, discharge, energy conservation -pt independent  REQUIRES OT FOLLOW UP: No  Activity Tolerance  Activity Tolerance: Patient Tolerated treatment well  Safety Devices  Safety Devices in place: Yes  Type of devices: All fall risk precautions in place; Patient at risk for falls;Nurse notified;Call light within reach; Left in bed;Bed alarm in place           Patient Diagnosis(es): The primary encounter diagnosis was Blood loss anemia. Diagnoses of Elevated troponin, Fatigue, unspecified type, Multiple lung nodules on CT, Liver lesion, and Mediastinal lymphadenopathy were also pertinent to this visit. has a past medical history of Allergic rhinitis, Anxiety state, CAD (coronary artery disease), Cataract, Chronic ischemic heart disease, Esophageal reflux, Essential hypertension, Glaucoma, Influenza, Insomnia, and Other and unspecified hyperlipidemia. has a past surgical history that includes Coronary angioplasty with stent; eye surgery; hernia repair; Colonoscopy; ERCP (2018); Cholecystectomy, laparoscopic (2018); Upper gastrointestinal endoscopy (N/A, 2022); and Colonoscopy (N/A, 2022).     Treatment Diagnosis: GI bleed      Restrictions  Restrictions/Precautions  Restrictions/Precautions: Fall Risk (med fall risk)  Position Activity Restriction  Other position/activity restrictions: pt admitted with low hgb. colonoscopy and EGD done. biopsy taken from mass found    Subjective   General  Chart Reviewed: Yes  Family / Caregiver Present: No  Diagnosis: acute GI bleed  Subjective  Subjective: pt supine upon arrival and agreeable to OT eval, denies pain. Patient Currently in Pain: Denies  Vital Signs  Patient Currently in Pain: Denies  Social/Functional History  Social/Functional History  Lives With: Spouse  Type of Home: House  Home Layout: One level,Laundry in basement,Work area in basement  Home Access: Stairs to enter with rails  Entrance Stairs - Number of Steps: 12 steps from garage entry  Bathroom Shower/Tub: Tub/Shower unit  [employer]: Standard  Bathroom Equipment: Grab bars in shower,Hand-held shower  ADL Assistance: Independent  Homemaking Responsibilities: Yes  Ambulation Assistance: Independent  Transfer Assistance: Independent  Active : Yes  Occupation: Retired  Leisure & Hobbies: dance with wife  Additional Comments: no falls reported       Objective        Orientation  Overall Orientation Status: Within Normal Limits  Observation/Palpation  Posture: Good  Observation: mass on L lateral C-spine, reports it has been there for years  Balance  Sitting Balance: Independent  Standing Balance: Supervision  Standing Balance  Time: ~15 minutes  Activity: ~400 ft around unit, flight of stairs with unilateral hand support  Comment: supervision  Functional Mobility  Functional - Mobility Device: No device  Activity: Other; To/from bathroom  Assist Level: Supervision  Functional Mobility Comments: SPO2 at 100 % HR 61 BPM  Toilet Transfers  Toilet - Technique: Ambulating  Equipment Used: Standard toilet  Toilet Transfer: Modified independent  ADL  Grooming: Supervision  UE Bathing: Supervision  LE Bathing: Supervision  UE Dressing: Independent  LE Dressing: Supervision (use of grab bar in standing)  Toileting: Independent  Additional Comments: pt stood at sink for oral care and shaving, voided urine seated. stood during UB/LB ADLs, use of grab bar when donning brief, donned socks independently sitting EOB.  Stood at sink ~15 minutes  Tone RUE  RUE Tone: Normotonic  Tone LUE  LUE Tone: Normotonic  Coordination  Movements Are Fluid And Coordinated: Yes        Transfers  Sit to stand: Supervision  Stand to sit: Supervision     Cognition  Overall Cognitive Status: WNL  Perception  Overall Perceptual Status: WFL     Sensation  Overall Sensation Status: Impaired  Light Touch: Partial deficits in the RUE;Partial deficits in the LUE (reports numbness and \"white\"discoloration in 1st, 2nd, and 3rd digits)        LUE AROM (degrees)  LUE AROM : WNL  RUE AROM (degrees)  RUE AROM : WNL  LUE Strength  Gross LUE Strength: WFL  RUE Strength  Gross RUE Strength: WFL                   Plan   Plan  Times per week: eval and d/c      AM-PAC Score        AM-[facility] Inpatient Daily Activity Raw Score: 20 (02/03/22 1152)  AM-PAC Inpatient ADL T-Scale Score : 42.03 (02/03/22 1152)  ADL Inpatient CMS 0-100% Score: 38.32 (02/03/22 1152)  ADL Inpatient CMS G-Code Modifier : CJ (02/03/22 [facility]2)    Goals  Short term goals  Time Frame for Short term goals: eval and d/c. pt close to baseline, wife to assist pt upon d/c       Therapy Time   Individual Concurrent Group Co-treatment   Time In 1046         Time Out 1143         Minutes 57           Timed Code Treatment Minutes:  42 Minutes    Total Treatment Minutes:  57 minutes      Mariah Santana OTR/L [id number]      Mariah Santana OT

## 2022-02-03 NOTE — PROGRESS NOTES
Shift assessment complete. HR tachy and increases to 140s with ambulation. Temp 99.7, skin flushed. Atenolol given per [facility] and Tylenol given for temp. Patient alert and oriented. Will continue to monitor.

## 2022-02-04 VITALS
OXYGEN SATURATION: 98 % | RESPIRATION RATE: 16 BRPM | TEMPERATURE: 97.4 F | DIASTOLIC BLOOD PRESSURE: 62 MMHG | HEIGHT: 70 IN | HEART RATE: 62 BPM | WEIGHT: 156 LBS | SYSTOLIC BLOOD PRESSURE: 138 MMHG | BODY MASS INDEX: 22.33 KG/M2

## 2022-02-04 LAB
ANION GAP SERPL CALCULATED.3IONS-SCNC: 11 MMOL/L (ref 3–16)
BASOPHILS ABSOLUTE: 0 K/UL (ref 0–0.2)
BASOPHILS RELATIVE PERCENT: 0.5 %
BUN BLDV-MCNC: 27 MG/DL (ref 7–20)
CALCIUM SERPL-MCNC: 8.3 MG/DL (ref 8.3–10.6)
CHLORIDE BLD-SCNC: 110 MMOL/L (ref 99–110)
CO2: 19 MMOL/L (ref 21–32)
CREAT SERPL-MCNC: 0.9 MG/DL (ref 0.8–1.3)
EOSINOPHILS ABSOLUTE: 0.1 K/UL (ref 0–0.6)
EOSINOPHILS RELATIVE PERCENT: 2.5 %
GFR AFRICAN AMERICAN: >60
GFR NON-AFRICAN AMERICAN: >60
GLUCOSE BLD-MCNC: 99 MG/DL (ref 70–99)
HCT VFR BLD CALC: 24.4 % (ref 40.5–52.5)
HEMOGLOBIN: 7.7 G/DL (ref 13.5–17.5)
LYMPHOCYTES ABSOLUTE: 0.7 K/UL (ref 1–5.1)
LYMPHOCYTES RELATIVE PERCENT: 12.8 %
MCH RBC QN AUTO: 26.7 PG (ref 26–34)
MCHC RBC AUTO-ENTMCNC: 31.7 G/DL (ref 31–36)
MCV RBC AUTO: 84.1 FL (ref 80–100)
MONOCYTES ABSOLUTE: 0.6 K/UL (ref 0–1.3)
MONOCYTES RELATIVE PERCENT: 10.7 %
NEUTROPHILS ABSOLUTE: 4.2 K/UL (ref 1.7–7.7)
NEUTROPHILS RELATIVE PERCENT: 73.5 %
PDW BLD-RTO: 18.2 % (ref 12.4–15.4)
PLATELET # BLD: 231 K/UL (ref 135–450)
PMV BLD AUTO: 6.9 FL (ref 5–10.5)
POTASSIUM REFLEX MAGNESIUM: 4 MMOL/L (ref 3.5–5.1)
RBC # BLD: 2.9 M/UL (ref 4.2–5.9)
SODIUM BLD-SCNC: 140 MMOL/L (ref 136–145)
WBC # BLD: 5.7 K/UL (ref 4–11)

## 2022-02-04 PROCEDURE — C9113 INJ PANTOPRAZOLE SODIUM, VIA: HCPCS | Performed by: INTERNAL MEDICINE

## 2022-02-04 PROCEDURE — 6360000002 HC RX W HCPCS: Performed by: INTERNAL MEDICINE

## 2022-02-04 PROCEDURE — 2580000003 HC RX 258: Performed by: INTERNAL MEDICINE

## 2022-02-04 PROCEDURE — 36415 COLL VENOUS BLD VENIPUNCTURE: CPT

## 2022-02-04 PROCEDURE — 85025 COMPLETE CBC W/AUTO DIFF WBC: CPT

## 2022-02-04 PROCEDURE — 6370000000 HC RX 637 (ALT 250 FOR IP): Performed by: INTERNAL MEDICINE

## 2022-02-04 PROCEDURE — 80048 BASIC METABOLIC PNL TOTAL CA: CPT

## 2022-02-04 RX ORDER — PANTOPRAZOLE SODIUM 40 MG/1
40 TABLET, DELAYED RELEASE ORAL DAILY
Qty: 30 TABLET | Refills: 0 | Status: SHIPPED | OUTPATIENT
Start: 2022-02-04

## 2022-02-04 RX ADMIN — LISINOPRIL 5 MG: 5 TABLET ORAL at 09:29

## 2022-02-04 RX ADMIN — Medication 10 ML: at 09:31

## 2022-02-04 RX ADMIN — ATENOLOL 50 MG: 50 TABLET ORAL at 09:30

## 2022-02-04 RX ADMIN — PANTOPRAZOLE SODIUM 40 MG: 40 INJECTION, POWDER, FOR SOLUTION INTRAVENOUS at 09:30

## 2022-02-04 RX ADMIN — ATORVASTATIN CALCIUM 20 MG: 20 TABLET, FILM COATED ORAL at 09:29

## 2022-02-04 ASSESSMENT — PAIN SCALES - GENERAL: PAINLEVEL_OUTOF10: 0

## 2022-02-04 NOTE — DISCHARGE INSTR - COC
Continuity of Care Form    Patient Name: Dimas Evans   :  1940  MRN:  [id number]    Admit date:  2022  Discharge date:  ***    Code Status Order: Limited   Advance Directives:      Admitting Physician:  Ruchi Aragon MD  PCP: Gerson Valentin MD    Discharging Nurse: [facility]/Room#: [id number]-[id number]/3824-05  Discharging Unit Phone Number: ***    Emergency Contact:   Extended Emergency Contact Information  Primary Emergency Contact: Samcarloss Jr  Address: [place] [address].           [facility], [address] [place] [address].  Home Phone: [phone]  Work Phone: [phone]  Relation: Spouse    Past Surgical History:  Past Surgical History:   Procedure Laterality Date    CHOLECYSTECTOMY, LAPAROSCOPIC  2018    LAPAROSCOPIC CHOLECYSTECTOMY WITH CHOLANGIOGRAM    COLONOSCOPY      total of 3, first one polyps, 2nd and 3rd no polyps    COLONOSCOPY N/A 2022    COLONOSCOPY POLYPECTOMY SNARE/COLD BIOPSY performed by Cassius Evans MD at [facility]      Dr Jamila Ji    ERCP  2018    EYE SURGERY      bilateral cataracts removed, \"floaters\"    HERNIA REPAIR      bilateral inguinal hernia repairs    UPPER GASTROINTESTINAL ENDOSCOPY N/A 2022    EGD BIOPSY performed by Cassius Evans MD at [address] [place] [address]       Immunization History:   Immunization History   Administered Date(s) Administered    COVID-19, Pfizer Purple top, DILUTE for use, 12+ yrs, 30mcg/0.3mL dose 2021, 2021, 2021    Influenza Virus Vaccine 10/15/2017    Pneumococcal Polysaccharide ([id number]) 2012    Tdap (Boostrix, Adacel) 2017    Zoster Live (Zostavax) 2009       Active Problems:  Patient Active Problem List   Diagnosis Code    Chronic ischemic heart disease I25.9    Cataract H26.9    Allergic rhinitis J30.9    Essential hypertension I10    Esophageal reflux K21.9    Other and unspecified hyperlipidemia E78.5    Insomnia G47.00    Glaucoma H40.9    Anxiety state F41.1    SVT (supraventricular tachycardia) ([facility]) I47.1    Coronary artery disease involving native coronary artery of native heart without angina pectoris I25.10    Ascending cholangitis K83.09    Acute pancreatitis K85.90    Acute gallstone pancreatitis K85.10    Cholecystitis K81.9    Abnormal LFTs R94.5    Acute GI bleeding K92.2       Isolation/Infection:   Isolation            No Isolation          Patient Infection Status       None to display            Nurse Assessment:  Last Vital Signs: /62   Pulse 62   Temp 97.4 °F (36.3 °C) (Oral)   Resp 16   Ht 5' 10\" (1.778 m)   Wt 156 lb (70.8 kg)   SpO2 98%   BMI 22.38 kg/m²     Last documented pain score (0-10 scale): Pain Level: 0  Last Weight:   Wt Readings from Last 1 Encounters:   22 156 lb (70.8 kg)     Mental Status:  {IP PT MENTAL STATUS:03564}    IV Access:  { JOSE IV ACCESS:427920120}    Nursing Mobility/ADLs:  Walking   {[facility] DME OVRN:477121104}  Transfer  {[facility] DME NPMR:739909748}  Bathing  {[facility] DME HQQ}  Dressing  {[facility] DME LOYB:733078029}  Toileting  {[facility] DME SOEZ:299262760}  Feeding  {[facility] DME HFAY:817248350}  Med Admin  {[facility] DME RXXX:047194976}  Med Delivery   { JOSE MED Delivery:896138357}    Wound Care Documentation and Therapy:        Elimination:  Continence: Bowel: {YES / TJ:69592}  Bladder: {YES / QM:08444}  Urinary Catheter: {Urinary Catheter:291401003}   Colostomy/Ileostomy/Ileal Conduit: {YES / MT:55779}       Date of Last BM: ***    Intake/Output Summary (Last 24 hours) at 2022 1111  Last data filed at 2022 0931  Gross per 24 hour   Intake 200 ml   Output --   Net 200 ml     I/O last 3 completed shifts:   In: 5 [P.O.:420]  Out: -     Safety Concerns:     [address] Concerns:548731643}    Impairments/Disabilities:      [address] [facility] Impairments/Disabilities:252349413}    Nutrition Therapy:  Current Nutrition Therapy:   [address] G[address]IA Diet List:456228922}    Routes of Feeding: {P DME Other Feedings:342706266}  Liquids: {Slp liquid thickness:53657}  Daily Fluid Restriction: {CHP DME Yes amt example:346224534}  Last Modified Barium Swallow with Video (Video Swallowing Test): {Done Not Done TKSV:041951702}    Treatments at the Time of Hospital Discharge:   Respiratory Treatments: ***  Oxygen Therapy:  {Therapy; copd oxygen:04104}  Ventilator:    { CC Vent QVCJ:985612467}    Rehab Therapies: {THERAPEUTIC INTERVENTION:3848840553}  Weight Bearing Status/Restrictions: { CC Weight Bearin}  Other Medical Equipment (for information only, NOT a DME order):  {EQUIPMENT:950584428}  Other Treatments: ***    Patient's personal belongings (please select all that are sent with patient):  {[facility] DME Belongings:634167199}    RN SIGNATURE:  {Esignature:093386595}    CASE MANAGEMENT/SOCIAL WORK SECTION    Inpatient Status Date: ***    Readmission Risk Assessment Score:  Readmission Risk              Risk of Unplanned Readmission:  11           Discharging to Facility/ Agency   Name:   Address:  Phone:  Fax:    Dialysis Facility (if applicable)   Name:  Address:  Dialysis Schedule:  Phone:  Fax:    / signature: {Esignature:853137919}    PHYSICIAN SECTION    Prognosis: {Prognosis:0373272964}    Condition at Discharge: [address] [place] [address] Patient Condition:441242894}    Rehab Potential (if transferring to Rehab): {Prognosis:4308415322}    Recommended Labs or Other Treatments After Discharge: ***    Physician Certification: I certify the above information and transfer of Jane Larson  is necessary for the continuing treatment of the diagnosis listed and that he requires {Admit to Appropriate Level of Care:68094} for {GREATER/LESS:317809690} 30 days.      Update Admission H&P: {CHP DME Changes in NRQE}    PHYSICIAN SIGNATURE:  {Esignature:580019624}

## 2022-02-04 NOTE — PROGRESS NOTES
[facility] FOLLOW-UP:     Primary Oncologist: Dr. Nessa Beasley  Date: 2/4/2022    PCP: Severo Reagin, MD  Referring Provider: No ref. provider found    PROBLEM LIST:     Patient Active Problem List   Diagnosis    Chronic ischemic heart disease    Cataract    Allergic rhinitis    Essential hypertension    Esophageal reflux    Other and unspecified hyperlipidemia    Insomnia    Glaucoma    Anxiety state    SVT (supraventricular tachycardia) ([facility] [address]    Coronary artery disease involving native coronary artery of native heart without angina pectoris    Ascending cholangitis    Acute pancreatitis    Acute gallstone pancreatitis    Cholecystitis    Abnormal LFTs    Acute GI bleeding       Specialty Problems     None          INTERVAL HISTORY     Mr. Ernie Glez is a 78 y.o. male  Admitted for severe anemia due to GI blood loss. Had EGD and colonoscopy. Some weight loss. Denies pain. Received RBC transfusion. REVIEW OF SYSTEMS:     CONSTITUTIONAL: Denies fever, sweats, weight loss     EYES: No visual changes or diplopia. No scleral icterus. ENT: No Headaches, hearing loss or vertigo. No mouth sores or sore throat. CARDIOVASCULAR: No chest pain, dyspnea on exertion, palpitations or loss of consciousness. RESPIRATORY: No cough or wheezing, no sputum production. No hemoptysis. GASTROINTESTINAL: No abdominal pain, appetite loss, blood in stools. No change in bowel habits. GENITOURINARY: No dysuria, trouble voiding, or hematuria. MUSCULOSKELETAL: no generalized weakness. No joint complaints. INTEGUMENTARY: No rash or pruritus. NEUROLOGICAL: No headache, diplopia. No change in gait, balance, or coordination. No paresthesia. ENDOCRINE: No temperature intolerance. No excessive thirst, fluid intake, or urination. HEMATOLOGIC/LYMPHATIC: No abnormal bruising or ecchymosis, blood clots or swollen lymph nodes. ALLERGIC/IMMUNOLOGIC: No nasal congestion or hives.      PHYSICAL EXAM:     /62   Pulse 62   Temp 97.4 °F (36.3 °C) (Oral)   Resp 16   Ht 5' 10\" (1.778 m)   Wt 156 lb (70.8 kg)   SpO2 98%   BMI 22.38 kg/m²     WEIGHT:   Wt Readings from Last 3 Encounters:   02/02/22 156 lb (70.8 kg)   04/28/21 165 lb (74.8 kg)   01/23/18 176 lb (79.8 kg)     GENERAL APPEARANCE: alert and cooperative. NAD. Still looks pale.    HEAD: Normocephalic, without obvious abnormality, atraumatic  NECK: No palpable lymphadenopathy in supraclavicular, axillary or cervical chains  LUNGS: Clear to auscultation bilaterally, no audible rales, wheezes or crackles  HEART: Regular rate and rhythm, S1, S2 normal  ABDOMEN: Soft, non-tender; bowel sounds normal; no masses, no organomegaly  EXTREMITIES: without cyanosis, clubbing, edema or asymmetry  SKIN: No jaundice, purpura or petechiae    LABS:     CBC:  Lab Results   Component Value Date    WBC 5.7 02/04/2022    HGB 7.7 (L) 02/04/2022    HCT 24.4 (L) 02/04/2022    MCV 84.1 02/04/2022     02/04/2022    LYMPHOPCT 12.8 02/04/2022    RBC 2.90 (L) 02/04/2022    MCH 26.7 02/04/2022    MCHC 31.7 02/04/2022    RDW 18.2 (H) 02/04/2022       Lab Results   Component Value Date     02/04/2022    K 4.0 02/04/2022     02/04/2022    CO2 19 (L) 02/04/2022    BUN 27 (H) 02/04/2022    CREATININE 0.9 02/04/2022    GLUCOSE 99 02/04/2022    CALCIUM 8.3 02/04/2022    PROT 5.6 (L) 02/01/2022    LABALBU 3.3 (L) 02/01/2022    BILITOT 0.6 02/01/2022    ALKPHOS 91 02/01/2022    AST 17 02/01/2022    ALT 11 02/01/2022    LABGLOM >60 02/04/2022    GFRAA >60 02/04/2022    AGRATIO 1.1 04/28/2021    GLOB 3.4 04/28/2021       No results found for: PTINR    TUMOR MARKERS:    No results found for: PSA, CEA, , [id number],       IMAGING:          CT CHEST WO CONTRAST    Result Date: 2/1/2022  EXAMINATION: CT OF THE CHEST WITHOUT CONTRAST 2/1/2022 11:09 am TECHNIQUE: CT of the chest was performed without the administration of intravenous contrast. Multiplanar reformatted images are provided for review. Dose modulation, iterative reconstruction, and/or weight based adjustment of the mA/kV was utilized to reduce the radiation dose to as low as reasonably achievable. COMPARISON: None. HISTORY: ORDERING SYSTEM PROVIDED HISTORY: new left lung opacity TECHNOLOGIST PROVIDED HISTORY: Reason for exam:->new left lung opacity Decision Support Exception - unselect if not a suspected or confirmed emergency medical condition->Emergency Medical Condition (MA) Reason for Exam: new left lung opacity FINDINGS: Mediastinum: Small mediastinal and small bilateral hilar lymph nodes evident. Not optimally evaluated on this noncontrast chest CT. No pericardial effusion. Coronary artery calcifications. Lungs/pleura: 16 mm nodule right lower lobe. Multiple small solid nodules in the left lower lobe. These nodules are all new compared to the previous exam.  The largest nodule in the left lower lobe measures 14 mm. No pleural effusion. No pneumothorax. Central airways are patent. Nodularity seen throughout the esophageal wall. This appears new compared to the previous exams. Upper Abdomen: Hiatal hernia. Low-attenuation lesions seen in the liver. Retro peer Leanna Starling in the visualized superior aspect of the abdomen. Soft Tissues/Bones: No acute fracture. No lytic or blastic lesion. Multiple solid nodules seen in the lower lobes, largest measuring 16 mm in the superior segment of the right lower lobe. There are mediastinal and hilar lymph nodes. Most of these are not pathologically enlarged. However, there is a 15 mm lymph nodes seen in the AP window. Low-attenuation lesions in the liver. This also is suboptimally evaluated with out contrast.  However, several of these lesions appear new. Metastatic disease in the liver needs to be considered. There also appears to be retroperitoneal adenopathy in the visualized portion of the superior aspect of the abdomen.  Findings in the lungs, mediastinum, visualized portion of the retroperitoneum and the liver suggest a neoplastic process In addition, nodularity is seen throughout the wall of the esophagus, not evident on the previous exam.  Esophagitis could give this appearance. RECOMMENDATIONS: Unavailable     CT ABDOMEN PELVIS W IV CONTRAST Additional Contrast? Oral    Result Date: 2/2/2022  EXAMINATION: CT OF THE ABDOMEN AND PELVIS WITH CONTRAST 2/1/2022 6:10 pm TECHNIQUE: CT of the abdomen and pelvis was performed with the administration of intravenous contrast. Multiplanar reformatted images are provided for review. Dose modulation, iterative reconstruction, and/or weight based adjustment of the mA/kV was utilized to reduce the radiation dose to as low as reasonably achievable. COMPARISON: 04/28/2021 CT abdomen and pelvis. 02/01/2022 CT chest. HISTORY: ORDERING SYSTEM PROVIDED HISTORY: Lung and liver lesions TECHNOLOGIST PROVIDED HISTORY: Reason for exam:->Lung and liver lesions Additional Contrast?->Oral FINDINGS: Lower Chest: Again demonstrated are multiple solid nodules throughout both lung bases likely representing metastatic disease. Please see separately dictated CT chest report from same day for discussion of these findings. There is also partially imaged right hilar and mediastinal lymphadenopathy described in more detail on prior chest CT report. There is a small hiatal hernia present. Organs: There are multiple enhancing hepatic masses consistent with diffuse hepatic metastatic disease. For example: *Left hepatic lobe segment 2, 3, lesion measuring approximately 29 x 19 mm *Segment 8, 30 x 22 mm lesion. *Segment 5, 16 x 21 mm lesion, *Segment 6, 17 x 19 mm lesion Multiple other enhancing hypoattenuating masses are noted throughout the liver. There is also a stable segment 7, posterior right hepatic lobe cyst measuring 4.1 x 3.4 cm in size. Status post cholecystectomy. Portal vein is patent. Pancreas demonstrates no acute abnormality.   No focal adrenal nodules. No evidence of splenomegaly or focal splenic lesion. The kidneys enhance symmetrically without evidence of hydronephrosis. GI/Bowel: Moderate size hiatal hernia. Stomach and duodenal sweep demonstrate no acute abnormality. The appendix is visualized and is unremarkable. No evidence of acute appendicitis. There is diverticulosis without evidence of diverticulitis. There is no evidence of bowel obstruction. No evidence of abnormal bowel wall thickening or distension. Pelvis: Bladder is unremarkable. There dense calcifications in the prostate. Peritoneum/Retroperitoneum: There is extensive abdominal lymphadenopathy. There are large periportal and peripancreatic lymph nodes measuring up to 27 x 20 mm in size. There is another periportal lymph node measuring 37 x 17 mm in size. There is also gastrohepatic ligament lymphadenopathy and central mesenteric lymphadenopathy measuring up to 16 x 12 mm. Retroperitoneal lymphadenopathy in the left periaortic region measuring up to 28 x 20 mm in size. There is aortocaval lymphadenopathy measuring 16 x 23 mm. There is no evidence of pelvic or inguinal lymphadenopathy. No ascites. No free air. No evidence of abscess. Aorta is normal in caliber. Moderate atherosclerotic disease of the aorta and branch vessels. Bones/Soft Tissues:  Age related degenerative changes of the visualized osseous structures without focal destructive lesion. There is a sacral stimulator lead present. Findings consistent with diffuse metastatic disease including multiple pulmonary masses, mediastinal lymphadenopathy, right hilar lymphadenopathy, upper abdominal lymphadenopathy and retroperitoneal lymphadenopathy as well as numerous hepatic masses. Abdomen findings are new since prior 04/28/2021, CT. See separate chest CT report for more detailed discussion of lower chest findings.      XR CHEST PORTABLE    Result Date: 2/1/2022  EXAMINATION: ONE XRAY VIEW OF THE CHEST 2/1/2022 9:43 am COMPARISON: Chest radiograph 04/28/2021 HISTORY: ORDERING SYSTEM PROVIDED HISTORY: fatigue TECHNOLOGIST PROVIDED HISTORY: Reason for exam:->fatigue Reason for Exam: Fatigue (Pt brought in by EMS from home states he had blood drawn yesterday and was told by his pcp his hgb is low and to come to ED) FINDINGS: Interval development of a rounded opacity seen in the left lung base. Lungs otherwise clear. No pleural fluid or pneumothorax. Cardiomediastinal silhouette is unchanged with mild tortuosity of the descending thoracic aorta     New opacity seen at the left lung base indeterminate etiology. Pneumonia, atelectasis or neoplasm are all on differential.  Recommend a follow-up noncontrast chest CT for further evaluation. i       STAGING:     Cancer Staging  No matching staging information was found for the patient. 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 Pulmonary and hepatic metastasis. Lymphadenopathy. Severe iron deficiency anemia  Performance status ECOG 0. Reviewed his labs, pathology and imaging studies. Explained the diagnosis. He is feeling reasonably well and improved after RBC transfusion. Okay to discharge from hem/onc perspective and follow-up in the office next week. Luann Coulter M.D.; M.S. Medical Oncology/Hematology  Phone: [phone]  Fax: [phone]    [place] [address] [address] # [facility], [address] [facility]    [address]   [facility]

## 2022-02-04 NOTE — DISCHARGE SUMMARY
Hospital Medicine Discharge Summary    Patient: Hany Bagley     Gender: male  : 1940   Age: 78 y.o. 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 Discharge Exam:     BP (!) 124/59   Pulse 65   Temp 97.8 °F (36.6 °C) (Temporal)   Resp 16   Ht 5' 10\" (1.778 m)   Wt 156 lb (70.8 kg)   SpO2 100%   BMI 22.38 kg/m²   General appearance:  Appears comfortable. AAOx3  HEENT: atraumatic, Pupils equal, muscous membranes moist, no masses appreciated  Cardiovascular: Regular rate and rhythm no murmurs appreciated  Respiratory: CTAB no wheezing  Gastrointestinal: Abdomen soft, non-tender, BS+  EXT: no edema  Neurology: no gross focal deficts  Psychiatry: Appropriate affect.  Not agitated  Skin: Warm, dry, no rashes appreciated    Discharge Medications:   Current Discharge Medication List        Current Discharge Medication List      CONTINUE these medications which have CHANGED    Details   pantoprazole (PROTONIX) 40 MG tablet Take 1 tablet by mouth daily  Qty: 30 tablet, Refills: 0           Current Discharge Medication List      CONTINUE these medications which have NOT CHANGED    Details   atenolol (TENORMIN) 25 MG tablet Take 50 mg by mouth daily       lisinopril (PRINIVIL;ZESTRIL) 5 MG tablet Take 5 mg by mouth daily       simvastatin (ZOCOR) 40 MG tablet Take 40 mg by mouth nightly       loratadine (CLARITIN) 10 MG tablet Take 10 mg by mouth as needed      ondansetron (ZOFRAN) 4 MG tablet Take 1 tablet by mouth every 4 hours as needed for Nausea or Vomiting  Qty: 15 tablet, Refills: 0    Associated Diagnoses: Nausea      acetaminophen (TYLENOL) 325 MG tablet Take 2 tablets by mouth every 4 hours as needed for Pain or Fever  Qty: 120 tablet, Refills: 3      ALPHAGAN P 0.1 % SOLN Place 1 drop into the left eye 3 times daily       dorzolamide (TRUSOPT) 2 % ophthalmic solution Place 1 drop into the right eye 3 times daily       zolpidem (AMBIEN) 5 MG tablet 2.5 mg.            Current Discharge Medication List      STOP taking these medications       aspirin 81 MG tablet Comments:   Reason for Stopping:               Labs:  For convenience and continuity at follow-up the following most recent labs are provided:    Lab Results   Component Value Date    WBC 5.7 02/04/2022    HGB 7.7 02/04/2022    HCT 24.4 02/04/2022    MCV 84.1 02/04/2022     02/04/2022     02/03/2022    K 4.0 02/03/2022     02/03/2022    CO2 18 02/03/2022    BUN 27 02/03/2022    CREATININE 1.0 02/03/2022    CALCIUM 8.4 02/03/2022    PHOS 2.9 10/29/2019    ALKPHOS 91 02/01/2022    ALT 11 02/01/2022    AST 17 02/01/2022    BILITOT 0.6 02/01/2022    BILIDIR 0.3 02/01/2022    LABALBU 3.3 02/01/2022    LDLCALC 25 10/01/2020    TRIG 94 10/01/2020     Lab Results   Component Value Date    INR 1.16 (H) 02/01/2022    INR 1.13 01/15/2018    INR 1.26 (H) 12/06/2017       Radiology:  CT CHEST WO CONTRAST    Result Date: 2/1/2022  EXAMINATION: CT OF THE CHEST WITHOUT CONTRAST 2/1/2022 11:09 am TECHNIQUE: CT of the chest was performed without the administration of intravenous contrast. Multiplanar reformatted images are provided for review. Dose modulation, iterative reconstruction, and/or weight based adjustment of the mA/kV was utilized to reduce the radiation dose to as low as reasonably achievable. COMPARISON: None. HISTORY: ORDERING SYSTEM PROVIDED HISTORY: new left lung opacity TECHNOLOGIST PROVIDED HISTORY: Reason for exam:->new left lung opacity Decision Support Exception - unselect if not a suspected or confirmed emergency medical condition->Emergency Medical Condition (MA) Reason for Exam: new left lung opacity FINDINGS: Mediastinum: Small mediastinal and small bilateral hilar lymph nodes evident. Not optimally evaluated on this noncontrast chest CT. No pericardial effusion. Coronary artery calcifications. Lungs/pleura: 16 mm nodule right lower lobe. Multiple small solid nodules in the left lower lobe. These nodules are all new compared to the previous exam.  The largest nodule in the left lower lobe measures 14 mm. No pleural effusion. No pneumothorax. Central airways are patent. Nodularity seen throughout the esophageal wall. This appears new compared to the previous exams. Upper Abdomen: Hiatal hernia. Low-attenuation lesions seen in the liver. Retro peer Marvina Anderson in the visualized superior aspect of the abdomen. Soft Tissues/Bones: No acute fracture. No lytic or blastic lesion. Multiple solid nodules seen in the lower lobes, largest measuring 16 mm in the superior segment of the right lower lobe. There are mediastinal and hilar lymph nodes. Most of these are not pathologically enlarged. However, there is a 15 mm lymph nodes seen in the AP window. Low-attenuation lesions in the liver. This also is suboptimally evaluated with out contrast.  However, several of these lesions appear new.   Metastatic disease in the liver needs to be considered. There also appears to be retroperitoneal adenopathy in the visualized portion of the superior aspect of the abdomen. Findings in the lungs, mediastinum, visualized portion of the retroperitoneum and the liver suggest a neoplastic process In addition, nodularity is seen throughout the wall of the esophagus, not evident on the previous exam.  Esophagitis could give this appearance. RECOMMENDATIONS: Unavailable     CT ABDOMEN PELVIS W IV CONTRAST Additional Contrast? Oral    Result Date: 2/2/2022  EXAMINATION: CT OF THE ABDOMEN AND PELVIS WITH CONTRAST 2/1/2022 6:10 pm TECHNIQUE: CT of the abdomen and pelvis was performed with the administration of intravenous contrast. Multiplanar reformatted images are provided for review. Dose modulation, iterative reconstruction, and/or weight based adjustment of the mA/kV was utilized to reduce the radiation dose to as low as reasonably achievable. COMPARISON: 04/28/2021 CT abdomen and pelvis. 02/01/2022 CT chest. HISTORY: ORDERING SYSTEM PROVIDED HISTORY: Lung and liver lesions TECHNOLOGIST PROVIDED HISTORY: Reason for exam:->Lung and liver lesions Additional Contrast?->Oral FINDINGS: Lower Chest: Again demonstrated are multiple solid nodules throughout both lung bases likely representing metastatic disease. Please see separately dictated CT chest report from same day for discussion of these findings. There is also partially imaged right hilar and mediastinal lymphadenopathy described in more detail on prior chest CT report. There is a small hiatal hernia present. Organs: There are multiple enhancing hepatic masses consistent with diffuse hepatic metastatic disease. For example: *Left hepatic lobe segment 2, 3, lesion measuring approximately 29 x 19 mm *Segment 8, 30 x 22 mm lesion. *Segment 5, 16 x 21 mm lesion, *Segment 6, 17 x 19 mm lesion Multiple other enhancing hypoattenuating masses are noted throughout the liver.   There is also a stable segment 7, posterior right hepatic lobe cyst measuring 4.1 x 3.4 cm in size. Status post cholecystectomy. Portal vein is patent. Pancreas demonstrates no acute abnormality. No focal adrenal nodules. No evidence of splenomegaly or focal splenic lesion. The kidneys enhance symmetrically without evidence of hydronephrosis. GI/Bowel: Moderate size hiatal hernia. Stomach and duodenal sweep demonstrate no acute abnormality. The appendix is visualized and is unremarkable. No evidence of acute appendicitis. There is diverticulosis without evidence of diverticulitis. There is no evidence of bowel obstruction. No evidence of abnormal bowel wall thickening or distension. Pelvis: Bladder is unremarkable. There dense calcifications in the prostate. Peritoneum/Retroperitoneum: There is extensive abdominal lymphadenopathy. There are large periportal and peripancreatic lymph nodes measuring up to 27 x 20 mm in size. There is another periportal lymph node measuring 37 x 17 mm in size. There is also gastrohepatic ligament lymphadenopathy and central mesenteric lymphadenopathy measuring up to 16 x 12 mm. Retroperitoneal lymphadenopathy in the left periaortic region measuring up to 28 x 20 mm in size. There is aortocaval lymphadenopathy measuring 16 x 23 mm. There is no evidence of pelvic or inguinal lymphadenopathy. No ascites. No free air. No evidence of abscess. Aorta is normal in caliber. Moderate atherosclerotic disease of the aorta and branch vessels. Bones/Soft Tissues:  Age related degenerative changes of the visualized osseous structures without focal destructive lesion. There is a sacral stimulator lead present. Findings consistent with diffuse metastatic disease including multiple pulmonary masses, mediastinal lymphadenopathy, right hilar lymphadenopathy, upper abdominal lymphadenopathy and retroperitoneal lymphadenopathy as well as numerous hepatic masses. Abdomen findings are new since prior 04/28/2021, CT. See separate chest CT report for more detailed discussion of lower chest findings. XR CHEST PORTABLE    Result Date: 2/1/2022  EXAMINATION: ONE XRAY VIEW OF THE CHEST 2/1/2022 9:43 am COMPARISON: Chest radiograph 04/28/2021 HISTORY: ORDERING SYSTEM PROVIDED HISTORY: fatigue TECHNOLOGIST PROVIDED HISTORY: Reason for exam:->fatigue Reason for Exam: Fatigue (Pt brought in by EMS from home states he had blood drawn yesterday and was told by his pcp his hgb is low and to come to ED) FINDINGS: Interval development of a rounded opacity seen in the left lung base. Lungs otherwise clear. No pleural fluid or pneumothorax. Cardiomediastinal silhouette is unchanged with mild tortuosity of the descending thoracic aorta     New opacity seen at the left lung base indeterminate etiology. Pneumonia, atelectasis or neoplasm are all on differential.  Recommend a follow-up noncontrast chest CT for further evaluation.  i         Signed:    Fidelina Jc MD   2/4/2022

## 2022-02-04 NOTE — CARE COORDINATION
Discharge Planning:     (CM) spoke with patient's Wife and informed that the patient would be sent home in a Lyft paid for by the hospital, as Wife stated there is no one else able to transport patient home. Wife agreed to this plan of action.     Wife previously confirmed that patient will be returning to his home address of:      [address] [place] [address]  Social Work -   [phone]    Electronically signed by EDUIN Finch on 2/4/2022 at 11:19 AM

## 2022-02-07 ENCOUNTER — CARE COORDINATION (OUTPATIENT)
Dept: CASE MANAGEMENT | Age: 82
End: 2022-02-07

## 2022-02-07 NOTE — CARE COORDINATION
[facility] Transitions Initial Follow Up Call    Call within 2 business days of discharge: Yes    Patient: Solitario Alcaraz Patient : 1940   MRN: [id number]  Reason for Admission: blood loss anemia  Discharge Date: 22 RARS: Readmission Risk Score: 12.9 ( )      Last Discharge [facility]       Complaint Diagnosis Description Type Department Provider    22 Fatigue Blood loss anemia . .. ED to Hosp-Admission (Discharged) (Candace Arana) Jannette Alba MD           Spoke with: Sandro    Non-face-to-face services provided:  Obtained and reviewed discharge summary and/or continuity of care documents  Assessment and support for treatment adherence and medication management-      Care Transitions 24 Hour Call    Do you have any ongoing symptoms?: Yes  Patient-reported symptoms: Fatigue  Do you have a copy of your discharge instructions?: Yes  Do you have all of your prescriptions and are they filled?: Yes  Have you scheduled your follow up appointment?: Yes  How are you going to get to your appointment?: Car - family or friend to transport  Were you discharged with any Home Care or Post Acute Services: No  Do you feel like you have everything you need to keep you well at home?: Yes  Care Transitions Interventions     States he still feels tired. Denies lightheadedness, dizziness, or s/s of bleeding. States his appetite is not good. He does eat at every meal but is unable to eat much. Discussed nutritional supplement such as ensure. Reports his wife is picking up a medication this morning and he has everything else. He has a follow up appt tomorrow with the oncologist. Denies questions or concerns at this time. Follow Up  No future appointments.     Molina Zhou

## 2022-02-09 ENCOUNTER — PRE-PROCEDURE TELEPHONE (OUTPATIENT)
Dept: INTERVENTIONAL RADIOLOGY/VASCULAR | Age: 82
End: 2022-02-09

## 2022-02-15 ENCOUNTER — CARE COORDINATION (OUTPATIENT)
Dept: CASE MANAGEMENT | Age: 82
End: 2022-02-15

## 2022-02-15 ENCOUNTER — HOSPITAL ENCOUNTER (OUTPATIENT)
Dept: INTERVENTIONAL RADIOLOGY/VASCULAR | Age: 82
Discharge: HOME | End: 2022-02-15
Payer: MEDICARE

## 2022-02-15 ENCOUNTER — HOSPITAL ENCOUNTER (OUTPATIENT)
Dept: INTERVENTIONAL RADIOLOGY/VASCULAR | Age: 82
Discharge: HOME | End: 2022-02-15

## 2022-02-15 VITALS
OXYGEN SATURATION: 99 % | HEIGHT: 70 IN | BODY MASS INDEX: 17.36 KG/M2 | TEMPERATURE: 97.4 F | SYSTOLIC BLOOD PRESSURE: 117 MMHG | RESPIRATION RATE: 14 BRPM | WEIGHT: 121.25 LBS | DIASTOLIC BLOOD PRESSURE: 64 MMHG | HEART RATE: 63 BPM

## 2022-02-15 DIAGNOSIS — C16.0 CARCINOMA OF CARDIO-ESOPHAGEAL JUNCTION (HCC): ICD-10-CM

## 2022-02-15 LAB
ANION GAP SERPL CALCULATED.3IONS-SCNC: 11 MMOL/L (ref 3–16)
APTT: 31.3 SEC (ref 26.2–38.6)
BUN BLDV-MCNC: 30 MG/DL (ref 7–20)
CALCIUM SERPL-MCNC: 8.7 MG/DL (ref 8.3–10.6)
CHLORIDE BLD-SCNC: 112 MMOL/L (ref 99–110)
CO2: 19 MMOL/L (ref 21–32)
CREAT SERPL-MCNC: 1 MG/DL (ref 0.8–1.3)
GFR AFRICAN AMERICAN: >60
GFR NON-AFRICAN AMERICAN: >60
GLUCOSE BLD-MCNC: 127 MG/DL (ref 70–99)
HCT VFR BLD CALC: 26.3 % (ref 40.5–52.5)
HEMOGLOBIN: 8.1 G/DL (ref 13.5–17.5)
INR BLD: 1.15 (ref 0.88–1.12)
MCH RBC QN AUTO: 25.1 PG (ref 26–34)
MCHC RBC AUTO-ENTMCNC: 30.9 G/DL (ref 31–36)
MCV RBC AUTO: 81 FL (ref 80–100)
PDW BLD-RTO: 19 % (ref 12.4–15.4)
PLATELET # BLD: 291 K/UL (ref 135–450)
PMV BLD AUTO: 6.8 FL (ref 5–10.5)
POTASSIUM SERPL-SCNC: 4.2 MMOL/L (ref 3.5–5.1)
PROTHROMBIN TIME: 13.1 SEC (ref 9.9–12.7)
RBC # BLD: 3.25 M/UL (ref 4.2–5.9)
SODIUM BLD-SCNC: 142 MMOL/L (ref 136–145)
WBC # BLD: 6.7 K/UL (ref 4–11)

## 2022-02-15 PROCEDURE — 2500000003 HC RX 250 WO HCPCS: Performed by: RADIOLOGY

## 2022-02-15 PROCEDURE — 80048 BASIC METABOLIC PNL TOTAL CA: CPT

## 2022-02-15 PROCEDURE — 2580000003 HC RX 258: Performed by: RADIOLOGY

## 2022-02-15 PROCEDURE — 6370000000 HC RX 637 (ALT 250 FOR IP): Performed by: RADIOLOGY

## 2022-02-15 PROCEDURE — 85730 THROMBOPLASTIN TIME PARTIAL: CPT

## 2022-02-15 PROCEDURE — 76937 US GUIDE VASCULAR ACCESS: CPT

## 2022-02-15 PROCEDURE — 85027 COMPLETE CBC AUTOMATED: CPT

## 2022-02-15 PROCEDURE — 99152 MOD SED SAME PHYS/QHP 5/>YRS: CPT

## 2022-02-15 PROCEDURE — 6360000002 HC RX W HCPCS: Performed by: RADIOLOGY

## 2022-02-15 PROCEDURE — 36561 INSERT TUNNELED CV CATH: CPT

## 2022-02-15 PROCEDURE — 85610 PROTHROMBIN TIME: CPT

## 2022-02-15 PROCEDURE — 36415 COLL VENOUS BLD VENIPUNCTURE: CPT

## 2022-02-15 PROCEDURE — 7100000010 HC PHASE II RECOVERY - FIRST 15 MIN

## 2022-02-15 PROCEDURE — 77001 FLUOROGUIDE FOR VEIN DEVICE: CPT

## 2022-02-15 PROCEDURE — 99153 MOD SED SAME PHYS/QHP EA: CPT

## 2022-02-15 PROCEDURE — 7100000011 HC PHASE II RECOVERY - ADDTL 15 MIN

## 2022-02-15 PROCEDURE — C1788 PORT, INDWELLING, IMP: HCPCS

## 2022-02-15 RX ORDER — FENTANYL CITRATE 50 UG/ML
INJECTION, SOLUTION INTRAMUSCULAR; INTRAVENOUS
Status: COMPLETED | OUTPATIENT
Start: 2022-02-15 | End: 2022-02-15

## 2022-02-15 RX ORDER — ACETAMINOPHEN 160 MG
TABLET,DISINTEGRATING ORAL
COMMUNITY

## 2022-02-15 RX ORDER — MIDAZOLAM HYDROCHLORIDE 1 MG/ML
INJECTION INTRAMUSCULAR; INTRAVENOUS
Status: COMPLETED | OUTPATIENT
Start: 2022-02-15 | End: 2022-02-15

## 2022-02-15 RX ORDER — LIDOCAINE HYDROCHLORIDE AND EPINEPHRINE BITARTRATE 20; .01 MG/ML; MG/ML
20 INJECTION, SOLUTION SUBCUTANEOUS ONCE
Status: COMPLETED | OUTPATIENT
Start: 2022-02-15 | End: 2022-02-15

## 2022-02-15 RX ORDER — ASCORBIC ACID 500 MG
500 TABLET ORAL DAILY
Status: ON HOLD | COMMUNITY
End: 2022-05-23

## 2022-02-15 RX ORDER — FERROUS SULFATE 325(65) MG
325 TABLET ORAL
COMMUNITY

## 2022-02-15 RX ORDER — ACETAMINOPHEN 325 MG/1
650 TABLET ORAL EVERY 4 HOURS PRN
Status: DISCONTINUED | OUTPATIENT
Start: 2022-02-15 | End: 2022-02-16 | Stop reason: HOSPADM

## 2022-02-15 RX ORDER — LIDOCAINE HYDROCHLORIDE 10 MG/ML
10 INJECTION, SOLUTION EPIDURAL; INFILTRATION; INTRACAUDAL; PERINEURAL ONCE
Status: COMPLETED | OUTPATIENT
Start: 2022-02-15 | End: 2022-02-15

## 2022-02-15 RX ORDER — HEPARIN SODIUM 200 [USP'U]/100ML
20 INJECTION, SOLUTION INTRAVENOUS CONTINUOUS
Status: ACTIVE | OUTPATIENT
Start: 2022-02-15 | End: 2022-02-15

## 2022-02-15 RX ADMIN — SODIUM BICARBONATE 0.5 MEQ: 0.2 INJECTION, SOLUTION INTRAVENOUS at 09:08

## 2022-02-15 RX ADMIN — FENTANYL CITRATE 25 MCG: 50 INJECTION, SOLUTION INTRAMUSCULAR; INTRAVENOUS at 08:45

## 2022-02-15 RX ADMIN — FENTANYL CITRATE 50 MCG: 50 INJECTION, SOLUTION INTRAMUSCULAR; INTRAVENOUS at 08:38

## 2022-02-15 RX ADMIN — CEFAZOLIN 2000 MG: 1 INJECTION, POWDER, FOR SOLUTION INTRAVENOUS at 08:37

## 2022-02-15 RX ADMIN — HEPARIN SODIUM IN SODIUM CHLORIDE 20 ML/HR: 200 INJECTION INTRAVENOUS at 09:06

## 2022-02-15 RX ADMIN — MIDAZOLAM 1 MG: 1 INJECTION INTRAMUSCULAR; INTRAVENOUS at 08:38

## 2022-02-15 RX ADMIN — MIDAZOLAM 0.5 MG: 1 INJECTION INTRAMUSCULAR; INTRAVENOUS at 08:45

## 2022-02-15 RX ADMIN — LIDOCAINE HYDROCHLORIDE 10 ML: 10 INJECTION, SOLUTION EPIDURAL; INFILTRATION; INTRACAUDAL; PERINEURAL at 09:07

## 2022-02-15 RX ADMIN — LIDOCAINE HYDROCHLORIDE,EPINEPHRINE BITARTRATE 10 ML: 20; .01 INJECTION, SOLUTION INFILTRATION; PERINEURAL at 09:07

## 2022-02-15 RX ADMIN — Medication: at 09:08

## 2022-02-15 RX ADMIN — MIDAZOLAM 0.5 MG: 1 INJECTION INTRAMUSCULAR; INTRAVENOUS at 08:52

## 2022-02-15 ASSESSMENT — PAIN SCALES - GENERAL
PAINLEVEL_OUTOF10: 0

## 2022-02-15 ASSESSMENT — PAIN - FUNCTIONAL ASSESSMENT: PAIN_FUNCTIONAL_ASSESSMENT: 0-10

## 2022-02-15 NOTE — PRE SEDATION
Sedation Pre-Procedure Note    Patient Name: Shawn Clark   YOB: 1940  Room/Bed: Room/bed info not found  Medical Record Number: [id number]  Date: 2/15/2022   Time: 9:13 AM       Indication:  Here for port placement. Consent: I have discussed with the patient and/or the patient representative the indication, alternatives, and the possible risks and/or complications of the planned procedure and the anesthesia methods. The patient and/or patient representative appear to understand and agree to proceed. Vital Signs:   Vitals:    02/15/22 0859   BP: (!) 102/56   Pulse: 63   Resp: 18   Temp:    SpO2: 94%       Past Medical History:   has a past medical history of Allergic rhinitis, Anxiety state, CAD (coronary artery disease), Cataract, Chronic ischemic heart disease, Esophageal reflux, Essential hypertension, Glaucoma, Influenza, Insomnia, and Other and unspecified hyperlipidemia. Past Surgical History:   has a past surgical history that includes Coronary angioplasty with stent; eye surgery; hernia repair; Colonoscopy; ERCP (01/16/2018); Cholecystectomy, laparoscopic (01/17/2018); Upper gastrointestinal endoscopy (N/A, 2/2/2022); and Colonoscopy (N/A, 2/2/2022). Medications:   Scheduled Meds:   Continuous Infusions:    Heparin (Porcine) 20 mL/hr (02/15/22 0906)     PRN Meds:   Home Meds:   Prior to Admission medications    Medication Sig Start Date End Date Taking?  Authorizing Provider   ferrous sulfate (IRON 325) 325 (65 Fe) MG tablet Take 325 mg by mouth daily (with breakfast)   Yes Historical Provider, MD   Cholecalciferol (VITAMIN D3) 50 MCG (2000 UT) CAPS Take by mouth   Yes Historical Provider, MD   vitamin C (ASCORBIC ACID) 500 MG tablet Take 500 mg by mouth daily   Yes Historical Provider, MD   Cholecalciferol (VITAMIN D3) 125 MCG (5000 UT) TABS Take by mouth   Yes Historical Provider, MD   pantoprazole (PROTONIX) 40 MG tablet Take 1 tablet by mouth daily 2/4/22  Yes Julia Jacobsen Peggy Arnold MD   atenolol (TENORMIN) 25 MG tablet Take 50 mg by mouth daily  10/15/17  Yes Historical Provider, MD   ALPHAGAN P 0.1 % SOLN Place 1 drop into the left eye 3 times daily  11/18/17  Yes Historical Provider, MD   dorzolamide (TRUSOPT) 2 % ophthalmic solution Place 1 drop into the right eye 3 times daily  3/6/16  Yes Historical Provider, MD   lisinopril (PRINIVIL;ZESTRIL) 5 MG tablet Take 5 mg by mouth daily  10/15/17  Yes Historical Provider, MD   simvastatin (ZOCOR) 40 MG tablet Take 40 mg by mouth nightly  6/1/17  Yes Historical Provider, MD   zolpidem (AMBIEN) 5 MG tablet 2.5 mg.  10/25/16  Yes Historical Provider, MD   loratadine (CLARITIN) 10 MG tablet Take 10 mg by mouth as needed    Historical Provider, MD   ondansetron (ZOFRAN) 4 MG tablet Take 1 tablet by mouth every 4 hours as needed for Nausea or Vomiting 1/19/18   Thea Aguilar MD   acetaminophen (TYLENOL) 325 MG tablet Take 2 tablets by mouth every 4 hours as needed for Pain or Fever 12/7/17   Jess Pelaez MD     Coumadin Use Last 7 Days:  no  Antiplatelet drug therapy use last 7 days: no  Other anticoagulant use last 7 days: no  Additional Medication Information:  n/a      Pre-Sedation Documentation and Exam:   I have reviewed the patient's history and review of systems.     Mallampati Airway Assessment:  Mallampati Class II - (soft palate, fauces & uvula are visible)    Prior History of Anesthesia Complications:   none    ASA Classification:  Class 2 - A normal healthy patient with mild systemic disease    Sedation/ Anesthesia Plan:   intravenous sedation    Medications Planned:   midazolam (Versed) intravenously and fentanyl intravenously    Patient is an appropriate candidate for plan of sedation: yes    Electronically signed by Frank Molina MD on 2/15/2022 at 9:13 AM

## 2022-02-15 NOTE — CARE COORDINATION
Opened in error, patient is having port placed  today and initial hospital follow up call has been completed. CTN will resolve episode and remain available.

## 2022-02-15 NOTE — PROGRESS NOTES
Pt provided PO fluids and pudding. Pt and pt wife aware pt will be discharged at 1040. Pt remains awake and alert, VSS.

## 2022-02-15 NOTE — PROGRESS NOTES
Discharge instructions reviewed with pt and pt wife, all questions answered and verbalized understanding of instructions. No scripts sent home with pt. Right chest surgical incision well approximated, no drainage/ redness/ bruising or swelling noted. Pt tolerated PO fluids and crackers well. No report of pain or nausea. VSS.

## 2022-02-15 NOTE — BRIEF OP NOTE
Brief Postoperative Note    Mayda Edwards  YOB: 1940  [id number]    Pre-operative Diagnosis: Esophageal cancer    Post-operative Diagnosis: Same    Procedure: Port placement    Anesthesia: Moderate Sedation    Surgeons: Wilda Patel MD    Estimated Blood Loss: Less than 5 mL    Complications: None    Specimens: Was Not Obtained    Findings: Successful placement of a right IJ port.     Electronically signed by Wilda Patel MD on 2/15/2022 at 9:15 AM

## 2022-02-16 NOTE — PROGRESS NOTES
Spoke to patient post Port placement procedure. Patients site was clean, dry and intact. Patient denies any discomfort post procedure. I addressed all the patients concerns, and directed patient to contact Special Procedures if they had any further questions.

## 2022-02-23 ENCOUNTER — HOSPITAL ENCOUNTER (OUTPATIENT)
Dept: ONCOLOGY | Age: 82
Setting detail: INFUSION SERIES
Discharge: HOME | End: 2022-02-23
Payer: MEDICARE

## 2022-02-23 VITALS
TEMPERATURE: 96.9 F | HEART RATE: 57 BPM | RESPIRATION RATE: 16 BRPM | DIASTOLIC BLOOD PRESSURE: 63 MMHG | SYSTOLIC BLOOD PRESSURE: 118 MMHG

## 2022-02-23 LAB
ABO/RH: NORMAL
ANTIBODY SCREEN: NORMAL
BLOOD BANK DISPENSE STATUS: NORMAL
BLOOD BANK PRODUCT CODE: NORMAL
BPU ID: NORMAL
DESCRIPTION BLOOD BANK: NORMAL

## 2022-02-23 PROCEDURE — 86850 RBC ANTIBODY SCREEN: CPT

## 2022-02-23 PROCEDURE — 86900 BLOOD TYPING SEROLOGIC ABO: CPT

## 2022-02-23 PROCEDURE — P9016 RBC LEUKOCYTES REDUCED: HCPCS

## 2022-02-23 PROCEDURE — 36430 TRANSFUSION BLD/BLD COMPNT: CPT

## 2022-02-23 PROCEDURE — 6370000000 HC RX 637 (ALT 250 FOR IP): Performed by: STUDENT IN AN ORGANIZED HEALTH CARE EDUCATION/TRAINING PROGRAM

## 2022-02-23 PROCEDURE — 99211 OFF/OP EST MAY X REQ PHY/QHP: CPT

## 2022-02-23 PROCEDURE — 86901 BLOOD TYPING SEROLOGIC RH(D): CPT

## 2022-02-23 PROCEDURE — 86923 COMPATIBILITY TEST ELECTRIC: CPT

## 2022-02-23 PROCEDURE — 2580000003 HC RX 258: Performed by: STUDENT IN AN ORGANIZED HEALTH CARE EDUCATION/TRAINING PROGRAM

## 2022-02-23 RX ORDER — DIPHENHYDRAMINE HCL 25 MG
25 TABLET ORAL SEE ADMIN INSTRUCTIONS
Status: COMPLETED | OUTPATIENT
Start: 2022-02-23 | End: 2022-02-23

## 2022-02-23 RX ORDER — ACETAMINOPHEN 325 MG/1
650 TABLET ORAL SEE ADMIN INSTRUCTIONS
Status: DISCONTINUED | OUTPATIENT
Start: 2022-02-23 | End: 2022-02-24 | Stop reason: HOSPADM

## 2022-02-23 RX ORDER — SODIUM CHLORIDE 9 MG/ML
INJECTION, SOLUTION INTRAVENOUS PRN
Status: COMPLETED | OUTPATIENT
Start: 2022-02-23 | End: 2022-02-23

## 2022-02-23 RX ADMIN — DIPHENHYDRAMINE HYDROCHLORIDE 25 MG: 25 TABLET ORAL at 08:36

## 2022-02-23 RX ADMIN — SODIUM CHLORIDE: 9 INJECTION, SOLUTION INTRAVENOUS at 08:36

## 2022-02-23 ASSESSMENT — PAIN SCALES - GENERAL: PAINLEVEL_OUTOF10: 0

## 2022-02-23 NOTE — PROGRESS NOTES
Blood transfusion given per [facility]. Transfusion complete. No signs of an adverse reaction. Given avs with signs and symptoms of a delayed reaction and when to call Dr. Marty Mcbride questions answered. Discharged per ambulatory with family member.

## 2022-03-02 ENCOUNTER — HOSPITAL ENCOUNTER (OUTPATIENT)
Dept: GENERAL RADIOLOGY | Age: 82
Discharge: HOME | End: 2022-03-02
Payer: MEDICARE

## 2022-03-02 ENCOUNTER — HOSPITAL ENCOUNTER (OUTPATIENT)
Dept: ONCOLOGY | Age: 82
Setting detail: INFUSION SERIES
Discharge: HOME | End: 2022-03-02
Payer: MEDICARE

## 2022-03-02 ENCOUNTER — HOSPITAL ENCOUNTER (OUTPATIENT)
Age: 82
Discharge: HOME | End: 2022-03-02
Payer: MEDICARE

## 2022-03-02 VITALS
HEART RATE: 60 BPM | RESPIRATION RATE: 16 BRPM | SYSTOLIC BLOOD PRESSURE: 144 MMHG | DIASTOLIC BLOOD PRESSURE: 71 MMHG | TEMPERATURE: 98.2 F

## 2022-03-02 DIAGNOSIS — M25.551 RIGHT HIP PAIN: ICD-10-CM

## 2022-03-02 LAB
ABO/RH: NORMAL
ANTIBODY SCREEN: NORMAL
BLOOD BANK DISPENSE STATUS: NORMAL
BLOOD BANK DISPENSE STATUS: NORMAL
BLOOD BANK PRODUCT CODE: NORMAL
BLOOD BANK PRODUCT CODE: NORMAL
BPU ID: NORMAL
BPU ID: NORMAL
DESCRIPTION BLOOD BANK: NORMAL
DESCRIPTION BLOOD BANK: NORMAL

## 2022-03-02 PROCEDURE — 99211 OFF/OP EST MAY X REQ PHY/QHP: CPT

## 2022-03-02 PROCEDURE — 6370000000 HC RX 637 (ALT 250 FOR IP): Performed by: STUDENT IN AN ORGANIZED HEALTH CARE EDUCATION/TRAINING PROGRAM

## 2022-03-02 PROCEDURE — P9016 RBC LEUKOCYTES REDUCED: HCPCS

## 2022-03-02 PROCEDURE — 73502 X-RAY EXAM HIP UNI 2-3 VIEWS: CPT

## 2022-03-02 PROCEDURE — 96374 THER/PROPH/DIAG INJ IV PUSH: CPT

## 2022-03-02 PROCEDURE — 2580000003 HC RX 258: Performed by: STUDENT IN AN ORGANIZED HEALTH CARE EDUCATION/TRAINING PROGRAM

## 2022-03-02 PROCEDURE — 86923 COMPATIBILITY TEST ELECTRIC: CPT

## 2022-03-02 PROCEDURE — 36430 TRANSFUSION BLD/BLD COMPNT: CPT

## 2022-03-02 PROCEDURE — 6360000002 HC RX W HCPCS: Performed by: STUDENT IN AN ORGANIZED HEALTH CARE EDUCATION/TRAINING PROGRAM

## 2022-03-02 PROCEDURE — 86901 BLOOD TYPING SEROLOGIC RH(D): CPT

## 2022-03-02 PROCEDURE — 86850 RBC ANTIBODY SCREEN: CPT

## 2022-03-02 PROCEDURE — 86900 BLOOD TYPING SEROLOGIC ABO: CPT

## 2022-03-02 PROCEDURE — 36591 DRAW BLOOD OFF VENOUS DEVICE: CPT

## 2022-03-02 RX ORDER — SODIUM CHLORIDE 9 MG/ML
INJECTION, SOLUTION INTRAVENOUS PRN
Status: DISCONTINUED | OUTPATIENT
Start: 2022-03-02 | End: 2022-03-03 | Stop reason: HOSPADM

## 2022-03-02 RX ORDER — SODIUM CHLORIDE 0.9 % (FLUSH) 0.9 %
5-40 SYRINGE (ML) INJECTION 2 TIMES DAILY
Status: DISCONTINUED | OUTPATIENT
Start: 2022-03-02 | End: 2022-03-03 | Stop reason: HOSPADM

## 2022-03-02 RX ORDER — FUROSEMIDE 10 MG/ML
20 INJECTION INTRAMUSCULAR; INTRAVENOUS PRN
Status: DISCONTINUED | OUTPATIENT
Start: 2022-03-02 | End: 2022-03-03 | Stop reason: HOSPADM

## 2022-03-02 RX ORDER — ACETAMINOPHEN 325 MG/1
650 TABLET ORAL SEE ADMIN INSTRUCTIONS
Status: DISCONTINUED | OUTPATIENT
Start: 2022-03-02 | End: 2022-03-03 | Stop reason: HOSPADM

## 2022-03-02 RX ORDER — DIPHENHYDRAMINE HCL 25 MG
25 TABLET ORAL SEE ADMIN INSTRUCTIONS
Status: COMPLETED | OUTPATIENT
Start: 2022-03-02 | End: 2022-03-02

## 2022-03-02 RX ORDER — SODIUM CHLORIDE 0.9 % (FLUSH) 0.9 %
5-40 SYRINGE (ML) INJECTION ONCE
Status: COMPLETED | OUTPATIENT
Start: 2022-03-02 | End: 2022-03-02

## 2022-03-02 RX ADMIN — SODIUM CHLORIDE, PRESERVATIVE FREE 10 ML: 5 INJECTION INTRAVENOUS at 10:40

## 2022-03-02 RX ADMIN — FUROSEMIDE 20 MG: 10 INJECTION, SOLUTION INTRAMUSCULAR; INTRAVENOUS at 14:48

## 2022-03-02 RX ADMIN — DIPHENHYDRAMINE HYDROCHLORIDE 25 MG: 25 TABLET ORAL at 10:56

## 2022-03-02 NOTE — PROGRESS NOTES
Blood transfusion given per [facility]. Transfusion complete. No signs of an adverse reaction. Patient given 20 mg IV lasix post transfusion. Given avs with signs and symptoms of a delayed reaction and when to call Dr. Carlos Sanchez questions answered. Discharged per ambulatory with family member.

## 2022-03-07 ENCOUNTER — HOSPITAL ENCOUNTER (OUTPATIENT)
Dept: NON INVASIVE DIAGNOSTICS | Age: 82
Discharge: HOME | End: 2022-03-07
Payer: MEDICARE

## 2022-03-07 DIAGNOSIS — Z51.11 ENCOUNTER FOR ANTINEOPLASTIC CHEMOTHERAPY: ICD-10-CM

## 2022-03-07 LAB
LV EF: 55 %
LVEF MODALITY: NORMAL

## 2022-03-07 PROCEDURE — 93306 TTE W/DOPPLER COMPLETE: CPT

## 2022-03-31 ENCOUNTER — HOSPITAL ENCOUNTER (OUTPATIENT)
Dept: MRI IMAGING | Age: 82
Discharge: HOME | End: 2022-03-30
Payer: MEDICARE

## 2022-03-31 DIAGNOSIS — C16.0 GASTROESOPHAGEAL CANCER (HCC): ICD-10-CM

## 2022-03-31 PROCEDURE — 73723 MRI JOINT LWR EXTR W/O&W/DYE: CPT

## 2022-03-31 PROCEDURE — 6360000004 HC RX CONTRAST MEDICATION: Performed by: INTERNAL MEDICINE

## 2022-03-31 PROCEDURE — A9577 INJ MULTIHANCE: HCPCS | Performed by: INTERNAL MEDICINE

## 2022-03-31 RX ADMIN — GADOBENATE DIMEGLUMINE 14 ML: 529 INJECTION, SOLUTION INTRAVENOUS at 15:11

## 2022-04-12 ENCOUNTER — APPOINTMENT (OUTPATIENT)
Dept: CT IMAGING | Age: 82
DRG: 299 | End: 2022-04-12
Payer: MEDICARE

## 2022-04-12 ENCOUNTER — APPOINTMENT (OUTPATIENT)
Dept: GENERAL RADIOLOGY | Age: 82
DRG: 299 | End: 2022-04-12
Payer: MEDICARE

## 2022-04-12 ENCOUNTER — HOSPITAL ENCOUNTER (INPATIENT)
Age: 82
LOS: 10 days | Discharge: HOME | DRG: 299 | End: 2022-04-22
Attending: INTERNAL MEDICINE | Admitting: INTERNAL MEDICINE
Payer: MEDICARE

## 2022-04-12 DIAGNOSIS — I82.441 ACUTE DEEP VEIN THROMBOSIS (DVT) OF TIBIAL VEIN OF RIGHT LOWER EXTREMITY (HCC): ICD-10-CM

## 2022-04-12 DIAGNOSIS — C15.9 MALIGNANT NEOPLASM OF ESOPHAGUS, UNSPECIFIED LOCATION (HCC): ICD-10-CM
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
## 2022-04-12 DIAGNOSIS — M25.551 HIP PAIN, RIGHT: ICD-10-CM
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
## 2022-04-12 DIAGNOSIS — I82.451 ACUTE DEEP VEIN THROMBOSIS (DVT) OF RIGHT PERONEAL VEIN (HCC): Primary | ICD-10-CM

## 2022-04-12 DIAGNOSIS — R53.1 GENERAL WEAKNESS: ICD-10-CM

## 2022-04-12 PROBLEM — I82.413 DVT FEMORAL (DEEP VENOUS THROMBOSIS) WITH THROMBOPHLEBITIS, BILATERAL (HCC): Status: ACTIVE | Noted: 2022-04-12

## 2022-04-12 LAB
A/G RATIO: 1.1 (ref 1.1–2.2)
ALBUMIN SERPL-MCNC: 2.5 G/DL (ref 3.4–5)
ALP BLD-CCNC: 100 U/L (ref 40–129)
ALT SERPL-CCNC: 21 U/L (ref 10–40)
ANION GAP SERPL CALCULATED.3IONS-SCNC: 10 MMOL/L (ref 3–16)
APTT: 27.6 SEC (ref 26.2–38.6)
AST SERPL-CCNC: 42 U/L (ref 15–37)
BASOPHILS ABSOLUTE: 0.1 K/UL (ref 0–0.2)
BASOPHILS RELATIVE PERCENT: 0.8 %
BILIRUB SERPL-MCNC: 0.4 MG/DL (ref 0–1)
BILIRUBIN URINE: ABNORMAL
BLOOD, URINE: NEGATIVE
BUN BLDV-MCNC: 26 MG/DL (ref 7–20)
CALCIUM SERPL-MCNC: 8.1 MG/DL (ref 8.3–10.6)
CHLORIDE BLD-SCNC: 105 MMOL/L (ref 99–110)
CLARITY: CLEAR
CO2: 22 MMOL/L (ref 21–32)
COLOR: YELLOW
CREAT SERPL-MCNC: 0.9 MG/DL (ref 0.8–1.3)
EOSINOPHILS ABSOLUTE: 0 K/UL (ref 0–0.6)
EOSINOPHILS RELATIVE PERCENT: 0 %
EPITHELIAL CELLS, UA: 0 /HPF (ref 0–5)
GFR AFRICAN AMERICAN: >60
GFR NON-AFRICAN AMERICAN: >60
GLUCOSE BLD-MCNC: 126 MG/DL (ref 70–99)
GLUCOSE URINE: NEGATIVE MG/DL
HCT VFR BLD CALC: 35.6 % (ref 40.5–52.5)
HCT VFR BLD CALC: 37.6 % (ref 40.5–52.5)
HCT VFR BLD CALC: 38.8 % (ref 40.5–52.5)
HEMOGLOBIN: 11.7 G/DL (ref 13.5–17.5)
HEMOGLOBIN: 12.3 G/DL (ref 13.5–17.5)
HEMOGLOBIN: 12.7 G/DL (ref 13.5–17.5)
HYALINE CASTS: 0 /LPF (ref 0–8)
KETONES, URINE: ABNORMAL MG/DL
LEUKOCYTE ESTERASE, URINE: NEGATIVE
LYMPHOCYTES ABSOLUTE: 1.2 K/UL (ref 1–5.1)
LYMPHOCYTES RELATIVE PERCENT: 15.9 %
MCH RBC QN AUTO: 30.7 PG (ref 26–34)
MCH RBC QN AUTO: 31.2 PG (ref 26–34)
MCHC RBC AUTO-ENTMCNC: 32.7 G/DL (ref 31–36)
MCHC RBC AUTO-ENTMCNC: 32.9 G/DL (ref 31–36)
MCV RBC AUTO: 93.6 FL (ref 80–100)
MCV RBC AUTO: 95.4 FL (ref 80–100)
MICROSCOPIC EXAMINATION: YES
MONOCYTES ABSOLUTE: 0.7 K/UL (ref 0–1.3)
MONOCYTES RELATIVE PERCENT: 9.7 %
NEUTROPHILS ABSOLUTE: 5.6 K/UL (ref 1.7–7.7)
NEUTROPHILS RELATIVE PERCENT: 73.6 %
NITRITE, URINE: NEGATIVE
PDW BLD-RTO: 18.6 % (ref 12.4–15.4)
PDW BLD-RTO: 18.7 % (ref 12.4–15.4)
PH UA: 5 (ref 5–8)
PLATELET # BLD: 256 K/UL (ref 135–450)
PLATELET # BLD: 258 K/UL (ref 135–450)
PMV BLD AUTO: 6.6 FL (ref 5–10.5)
PMV BLD AUTO: 6.8 FL (ref 5–10.5)
POTASSIUM REFLEX MAGNESIUM: 4 MMOL/L (ref 3.5–5.1)
PROTEIN UA: ABNORMAL MG/DL
RBC # BLD: 3.8 M/UL (ref 4.2–5.9)
RBC # BLD: 3.94 M/UL (ref 4.2–5.9)
RBC UA: 4 /HPF (ref 0–4)
SODIUM BLD-SCNC: 137 MMOL/L (ref 136–145)
SPECIFIC GRAVITY UA: >=1.03 (ref 1–1.03)
TOTAL PROTEIN: 4.7 G/DL (ref 6.4–8.2)
URINE REFLEX TO CULTURE: ABNORMAL
URINE TYPE: ABNORMAL
UROBILINOGEN, URINE: 1 E.U./DL
WBC # BLD: 6.5 K/UL (ref 4–11)
WBC # BLD: 7.6 K/UL (ref 4–11)
WBC UA: 1 /HPF (ref 0–5)

## 2022-04-12 PROCEDURE — 85025 COMPLETE CBC W/AUTO DIFF WBC: CPT

## 2022-04-12 PROCEDURE — 36415 COLL VENOUS BLD VENIPUNCTURE: CPT

## 2022-04-12 PROCEDURE — 85027 COMPLETE CBC AUTOMATED: CPT

## 2022-04-12 PROCEDURE — 81001 URINALYSIS AUTO W/SCOPE: CPT

## 2022-04-12 PROCEDURE — 99284 EMERGENCY DEPT VISIT MOD MDM: CPT

## 2022-04-12 PROCEDURE — 70450 CT HEAD/BRAIN W/O DYE: CPT

## 2022-04-12 PROCEDURE — 2580000003 HC RX 258: Performed by: INTERNAL MEDICINE

## 2022-04-12 PROCEDURE — 73502 X-RAY EXAM HIP UNI 2-3 VIEWS: CPT

## 2022-04-12 PROCEDURE — 6370000000 HC RX 637 (ALT 250 FOR IP): Performed by: INTERNAL MEDICINE

## 2022-04-12 PROCEDURE — 6360000002 HC RX W HCPCS: Performed by: INTERNAL MEDICINE

## 2022-04-12 PROCEDURE — 1200000000 HC SEMI PRIVATE

## 2022-04-12 PROCEDURE — 85014 HEMATOCRIT: CPT

## 2022-04-12 PROCEDURE — 85730 THROMBOPLASTIN TIME PARTIAL: CPT

## 2022-04-12 PROCEDURE — 93971 EXTREMITY STUDY: CPT

## 2022-04-12 PROCEDURE — 85018 HEMOGLOBIN: CPT

## 2022-04-12 PROCEDURE — 80053 COMPREHEN METABOLIC PANEL: CPT

## 2022-04-12 RX ORDER — LISINOPRIL 5 MG/1
5 TABLET ORAL DAILY
Status: DISCONTINUED | OUTPATIENT
Start: 2022-04-13 | End: 2022-04-22 | Stop reason: HOSPADM

## 2022-04-12 RX ORDER — ONDANSETRON 4 MG/1
4 TABLET, ORALLY DISINTEGRATING ORAL EVERY 8 HOURS PRN
Status: DISCONTINUED | OUTPATIENT
Start: 2022-04-12 | End: 2022-04-22 | Stop reason: HOSPADM

## 2022-04-12 RX ORDER — HEPARIN SODIUM 1000 [USP'U]/ML
80 INJECTION, SOLUTION INTRAVENOUS; SUBCUTANEOUS PRN
Status: DISCONTINUED | OUTPATIENT
Start: 2022-04-12 | End: 2022-04-14 | Stop reason: ALTCHOICE

## 2022-04-12 RX ORDER — ATORVASTATIN CALCIUM 10 MG/1
10 TABLET, FILM COATED ORAL NIGHTLY
Status: DISCONTINUED | OUTPATIENT
Start: 2022-04-12 | End: 2022-04-22 | Stop reason: HOSPADM

## 2022-04-12 RX ORDER — PANTOPRAZOLE SODIUM 40 MG/1
40 TABLET, DELAYED RELEASE ORAL DAILY
Status: DISCONTINUED | OUTPATIENT
Start: 2022-04-12 | End: 2022-04-12

## 2022-04-12 RX ORDER — ACETAMINOPHEN 325 MG/1
650 TABLET ORAL EVERY 6 HOURS PRN
Status: DISCONTINUED | OUTPATIENT
Start: 2022-04-12 | End: 2022-04-22 | Stop reason: HOSPADM

## 2022-04-12 RX ORDER — ONDANSETRON 2 MG/ML
4 INJECTION INTRAMUSCULAR; INTRAVENOUS EVERY 6 HOURS PRN
Status: DISCONTINUED | OUTPATIENT
Start: 2022-04-12 | End: 2022-04-22 | Stop reason: HOSPADM

## 2022-04-12 RX ORDER — POLYETHYLENE GLYCOL 3350 17 G/17G
17 POWDER, FOR SOLUTION ORAL DAILY PRN
Status: DISCONTINUED | OUTPATIENT
Start: 2022-04-12 | End: 2022-04-22 | Stop reason: HOSPADM

## 2022-04-12 RX ORDER — SODIUM CHLORIDE 9 MG/ML
INJECTION, SOLUTION INTRAVENOUS PRN
Status: DISCONTINUED | OUTPATIENT
Start: 2022-04-12 | End: 2022-04-22 | Stop reason: HOSPADM

## 2022-04-12 RX ORDER — ACETAMINOPHEN 650 MG/1
650 SUPPOSITORY RECTAL EVERY 6 HOURS PRN
Status: DISCONTINUED | OUTPATIENT
Start: 2022-04-12 | End: 2022-04-22 | Stop reason: HOSPADM

## 2022-04-12 RX ORDER — ATENOLOL 50 MG/1
50 TABLET ORAL DAILY
Status: DISCONTINUED | OUTPATIENT
Start: 2022-04-13 | End: 2022-04-22 | Stop reason: HOSPADM

## 2022-04-12 RX ORDER — HEPARIN SODIUM 1000 [USP'U]/ML
80 INJECTION, SOLUTION INTRAVENOUS; SUBCUTANEOUS ONCE
Status: COMPLETED | OUTPATIENT
Start: 2022-04-12 | End: 2022-04-12

## 2022-04-12 RX ORDER — SODIUM CHLORIDE 0.9 % (FLUSH) 0.9 %
5-40 SYRINGE (ML) INJECTION EVERY 12 HOURS SCHEDULED
Status: DISCONTINUED | OUTPATIENT
Start: 2022-04-12 | End: 2022-04-22 | Stop reason: HOSPADM

## 2022-04-12 RX ORDER — SODIUM CHLORIDE 0.9 % (FLUSH) 0.9 %
5-40 SYRINGE (ML) INJECTION PRN
Status: DISCONTINUED | OUTPATIENT
Start: 2022-04-12 | End: 2022-04-22 | Stop reason: HOSPADM

## 2022-04-12 RX ORDER — SODIUM CHLORIDE, SODIUM LACTATE, POTASSIUM CHLORIDE, CALCIUM CHLORIDE 600; 310; 30; 20 MG/100ML; MG/100ML; MG/100ML; MG/100ML
INJECTION, SOLUTION INTRAVENOUS CONTINUOUS
Status: ACTIVE | OUTPATIENT
Start: 2022-04-12 | End: 2022-04-12

## 2022-04-12 RX ORDER — HEPARIN SODIUM 10000 [USP'U]/100ML
5-30 INJECTION, SOLUTION INTRAVENOUS CONTINUOUS
Status: DISCONTINUED | OUTPATIENT
Start: 2022-04-12 | End: 2022-04-14 | Stop reason: ALTCHOICE

## 2022-04-12 RX ORDER — HEPARIN SODIUM 1000 [USP'U]/ML
40 INJECTION, SOLUTION INTRAVENOUS; SUBCUTANEOUS PRN
Status: DISCONTINUED | OUTPATIENT
Start: 2022-04-12 | End: 2022-04-14 | Stop reason: ALTCHOICE

## 2022-04-12 RX ADMIN — ATORVASTATIN CALCIUM 10 MG: 10 TABLET, FILM COATED ORAL at 22:21

## 2022-04-12 RX ADMIN — SODIUM CHLORIDE, POTASSIUM CHLORIDE, SODIUM LACTATE AND CALCIUM CHLORIDE: 600; 310; 30; 20 INJECTION, SOLUTION INTRAVENOUS at 19:22

## 2022-04-12 RX ADMIN — HEPARIN SODIUM 18 UNITS/KG/HR: 5000 INJECTION, SOLUTION INTRAVENOUS; SUBCUTANEOUS at 19:21

## 2022-04-12 RX ADMIN — SODIUM CHLORIDE, PRESERVATIVE FREE 10 ML: 5 INJECTION INTRAVENOUS at 22:21

## 2022-04-12 RX ADMIN — HEPARIN SODIUM 4540 UNITS: 1000 INJECTION INTRAVENOUS; SUBCUTANEOUS at 19:17

## 2022-04-12 ASSESSMENT — PAIN DESCRIPTION - ONSET: ONSET: SUDDEN

## 2022-04-12 ASSESSMENT — PAIN SCALES - GENERAL
PAINLEVEL_OUTOF10: 0
PAINLEVEL_OUTOF10: 7
PAINLEVEL_OUTOF10: 0

## 2022-04-12 ASSESSMENT — PAIN DESCRIPTION - ORIENTATION: ORIENTATION: RIGHT

## 2022-04-12 ASSESSMENT — PAIN DESCRIPTION - PAIN TYPE: TYPE: ACUTE PAIN

## 2022-04-12 ASSESSMENT — PAIN DESCRIPTION - FREQUENCY: FREQUENCY: CONTINUOUS

## 2022-04-12 ASSESSMENT — PAIN DESCRIPTION - LOCATION: LOCATION: HIP

## 2022-04-12 ASSESSMENT — PAIN DESCRIPTION - DESCRIPTORS: DESCRIPTORS: DISCOMFORT

## 2022-04-12 ASSESSMENT — PAIN - FUNCTIONAL ASSESSMENT: PAIN_FUNCTIONAL_ASSESSMENT: 0-10

## 2022-04-12 NOTE — ACP (ADVANCE CARE PLANNING)
Advanced Care Planning Note.     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

## 2022-04-12 NOTE — H&P
HOSPITALISTS HISTORY AND PHYSICAL    4/12/2022 3:21 PM    Patient Information:  Silvestre Wren is a 78 y.o. male [id number]  PCP:  Bartolo Pepper MD (Tel: [phone] )    Chief complaint:    Chief Complaint   Patient presents with    Fall     pt. fell at home and cx: of Right Hip pain       History of Present Illness:  Oren Gatica is a 78 y.o. male in February with acute GI bleed required 1 unit of PRBCs had a EGD and colonoscopy which showed small colon polyps and extensive esophageal tumors found to be adenocarcinoma of the.  Patient has been having chemotherapy for this today patient felt that his wife called EMS patient had 3 falls over the last couple weeks patient has become extremely weak poor p.o. intake no chest pain no lightheadedness or dizziness. Patient had increased right lower extremity swelling compared to left extremity had ultrasound performed which showed right lower extremity DVT        REVIEW OF SYSTEMS:   Constitutional: Negative for fever,chills or night sweats  ENT: Negative for rhinorrhea, epistaxis, hoarseness, sore throat. Respiratory: Negative for shortness of breath,wheezing  Cardiovascular: Negative for chest pain, palpitations   Gastrointestinal:see above  Genitourinary: Negative for polyuria, dysuria   Hematologic/Lymphatic: Negative for bleeding tendency, easy bruising  Musculoskeletal: Negative for myalgias and arthralgias  Neurologic: Negative for confusion,dysarthria. Skin: Negative for itching,rash  Psychiatric: Negative for depression,anxiety, agitation. Endocrine: Negative for polydipsia,polyuria,heat /cold intolerance.     Past Medical History:   has a past medical history of Allergic rhinitis, Anxiety state, CAD (coronary artery disease), Cataract, Chronic ischemic heart disease, Esophageal reflux, Essential hypertension, Glaucoma, Influenza, Insomnia, and Other and unspecified hyperlipidemia. Past Surgical History:   has a past surgical history that includes Coronary angioplasty with stent; eye surgery; hernia repair; Colonoscopy; ERCP (01/16/2018); Cholecystectomy, laparoscopic (01/17/2018); Upper gastrointestinal endoscopy (N/A, 2/2/2022); Colonoscopy (N/A, 2/2/2022); and IR PORT PLACEMENT > 5 YEARS (2/15/2022). Medications:  No current facility-administered medications on file prior to encounter. Current Outpatient Medications on File Prior to Encounter   Medication Sig Dispense Refill    ferrous sulfate (IRON 325) 325 (65 Fe) MG tablet Take 325 mg by mouth daily (with breakfast)      Cholecalciferol (VITAMIN D3) 50 MCG (2000 UT) CAPS Take by mouth      vitamin C (ASCORBIC ACID) 500 MG tablet Take 500 mg by mouth daily      Cholecalciferol (VITAMIN D3) 125 MCG (5000 UT) TABS Take by mouth      pantoprazole (PROTONIX) 40 MG tablet Take 1 tablet by mouth daily 30 tablet 0    loratadine (CLARITIN) 10 MG tablet Take 10 mg by mouth as needed      ondansetron (ZOFRAN) 4 MG tablet Take 1 tablet by mouth every 4 hours as needed for Nausea or Vomiting 15 tablet 0    acetaminophen (TYLENOL) 325 MG tablet Take 2 tablets by mouth every 4 hours as needed for Pain or Fever (Patient not taking: Reported on 4/12/2022) 120 tablet 3    atenolol (TENORMIN) 25 MG tablet Take 50 mg by mouth daily       ALPHAGAN P 0.1 % SOLN Place 1 drop into the left eye 3 times daily       dorzolamide (TRUSOPT) 2 % ophthalmic solution Place 1 drop into the right eye 3 times daily       lisinopril (PRINIVIL;ZESTRIL) 5 MG tablet Take 5 mg by mouth daily       simvastatin (ZOCOR) 40 MG tablet Take 40 mg by mouth nightly       zolpidem (AMBIEN) 5 MG tablet 2.5 mg. Allergies:  No Known Allergies     Social History:  Patient Lives at home   reports that he quit smoking about 33 years ago. His smoking use included cigarettes. He has a 35.00 pack-year smoking history.  He has never used smokeless tobacco. He reports current alcohol use. He reports that he does not use drugs. Family History:  family history includes Cancer in his mother; Dementia in his mother; Stroke in his father. ,     Physical Exam:  /77   Pulse 75   Resp 19   Ht 5' 10\" (1.778 m)   Wt 125 lb (56.7 kg)   SpO2 96%   BMI 17.94 kg/m²     General appearance:  Appears comfortable. AAOx3  HEENT: atraumatic, Pupils equal, muscous membranes moist, no masses appreciated  Cardiovascular: Regular rate and rhythm no murmurs appreciated  Respiratory: CTAB no wheezing  Gastrointestinal: Abdomen soft, non-tender, BS+  EXT: RLE>LLE swelling  Neurology: no gross focal deficts  Psychiatry: Appropriate affect. Not agitated  Skin: Warm, dry, no rashes appreciated    Labs:  CBC:   Lab Results   Component Value Date    WBC 7.6 04/12/2022    RBC 3.80 04/12/2022    HGB 11.7 04/12/2022    HCT 35.6 04/12/2022    MCV 93.6 04/12/2022    MCH 30.7 04/12/2022    MCHC 32.9 04/12/2022    RDW 18.7 04/12/2022     04/12/2022    MPV 6.6 04/12/2022     BMP:    Lab Results   Component Value Date     04/12/2022    K 4.0 04/12/2022     04/12/2022    CO2 22 04/12/2022    BUN 26 04/12/2022    CREATININE 0.9 04/12/2022    CALCIUM 8.1 04/12/2022    GFRAA >60 04/12/2022    GFRAA >60 04/12/2013    LABGLOM >60 04/12/2022    GLUCOSE 126 04/12/2022     VL Extremity Venous Right         CT Head WO Contrast   Final Result   No acute intracranial abnormality. Moderate senescent changes with parenchymal volume loss and chronic small   vessel ischemic changes. XR HIP 2-3 VW W PELVIS RIGHT   Final Result   1. No acute abnormality. Recent imaging reviewed    Problem List  Principal Problem:    DVT femoral (deep venous thrombosis) with thrombophlebitis, bilateral (HCC)  Resolved Problems:    * No resolved hospital problems.  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 Please note that some part of this chart was generated using Dragon dictation software. Although every effort was made to ensure the accuracy of this automated transcription, some errors in transcription may have occurred inadvertently. If you may need any clarification, please do not hesitate to contact me through [facility].        Felix Cao MD    4/12/2022 3:21 PM

## 2022-04-12 NOTE — ED NOTES
Report given to [facility]. No further questions at this time. Pt transported to floor via wheelchair by [facility].       Leslie Carrillo RN  04/12/22 1254

## 2022-04-12 NOTE — ED NOTES
Pt assisted to bedside commode. Returned to bed, call light within reach. Wife bedside.       Maryse Allen RN  04/12/22 8144

## 2022-04-12 NOTE — ED PROVIDER NOTES
[address] [facility]        Pt Name: Ramón Griffin  MRN: [id number]  [place] 1940  Date of evaluation: 4/12/2022  Provider: KENDELL Silva - MYLENE  PCP: Praneeth Brewster MD  Note Started: 11:50 AM EDT       RODOLFO. I have evaluated this patient. My supervising physician was available for consultation. CHIEF COMPLAINT       Chief Complaint   Patient presents with    Fall     pt. fell at home and cx: of Right Hip pain       HISTORY OF PRESENT ILLNESS   (Location, Timing/Onset, Context/Setting, Quality, Duration, Modifying Factors, Severity, Associated Signs and Symptoms)  Note limiting factors. Chief Complaint: Simone Banda is a 78 y.o. male rhinitis, anxiety CAD, ischemic heart disease, GERD, HTN, paroxysmal A. fib, glaucoma, insomnia, cholecystectomy, stomach cancer presents emergency department with complaints of a fall that occurred this morning. Patient said he was getting up out of his bed and was walking on the carpeted floor when he turned around the bed too fast, lost his balance, and fell to the right side. He does not believe he struck his head. His only complaint of pain to the right hip. He was able to get up with the assistance of his walker after the fall, although did have some difficulty. He denies any pain when lying flat in the bed at rest.  When he attempts to get up and walk pain level is 6/10. He does note to getting chemo treatments for his stomach cancer and has a port to his right chest wall. He does note some swelling into his right lower extremity in the calf, although is unsure if it is any worse. He denies any headaches, neck pain, back pain, lightheaded, dizzy, syncope, numbness, tingling, weakness, nausea, vomiting, diarrhea, loss of bowel or bladder function. Patient lives at home independently with his wife. He denies being anticoagulated. He does have a baseline history of A. Fib.    Nursing Notes were all reviewed and agreed with or any disagreements were addressed in the HPI. REVIEW OF SYSTEMS    (2-9 systems for level 4, 10 or more for level 5)     Review of Systems    Positives and Pertinent negatives as per HPI. Except as noted above in the ROS, all other systems were reviewed and negative.        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    Esophageal reflux 9/6/2007    Essential hypertension 12/7/2007    Overview:  ICD-10 Transition    Glaucoma     left eye    Influenza 12/06/2017    Insomnia 9/24/2008    Other and unspecified hyperlipidemia 9/24/2008         SURGICAL HISTORY     Past Surgical History:   Procedure Laterality Date    CHOLECYSTECTOMY, LAPAROSCOPIC  01/17/2018    LAPAROSCOPIC CHOLECYSTECTOMY WITH CHOLANGIOGRAM    COLONOSCOPY      total of 3, first one polyps, 2nd and 3rd no polyps    COLONOSCOPY N/A 2/2/2022    COLONOSCOPY POLYPECTOMY SNARE/COLD BIOPSY performed by Traci Rasheed MD at [facility]      Dr Osman Brennan [facility]  01/16/2018    EYE SURGERY      bilateral cataracts removed, \"floaters\"    HERNIA REPAIR      bilateral inguinal hernia repairs    IR PORT PLACEMENT EQUAL OR GREATER THAN 5 YEARS  2/15/2022    IR PORT PLACEMENT EQUAL OR GREATER THAN 5 YEARS [facility]20[facility]    UPPER GASTROINTESTINAL ENDOSCOPY N/A 2/2/2022    EGD BIOPSY performed by Traci Rasheed MD at [address]       Previous Medications    ACETAMINOPHEN (TYLENOL) 325 MG TABLET    Take 2 tablets by mouth every 4 hours as needed for Pain or Fever    ALPHAGAN P 0.1 % SOLN    Place 1 drop into the left eye 3 times daily     ATENOLOL (TENORMIN) 25 MG TABLET    Take 50 mg by mouth daily     CHOLECALCIFEROL (VITAMIN D3) 125 MCG (5000 UT) TABS    Take by mouth    CHOLECALCIFEROL (VITAMIN D3) 50 MCG (2000 UT) CAPS    Take by mouth    DORZOLAMIDE (TRUSOPT) 2 % OPHTHALMIC SOLUTION    Place 1 drop into the right eye 3 times daily     FERROUS SULFATE (IRON 325) 325 (65 FE) MG TABLET    Take 325 mg by mouth daily (with breakfast)    LISINOPRIL (PRINIVIL;ZESTRIL) 5 MG TABLET    Take 5 mg by mouth daily     LORATADINE (CLARITIN) 10 MG TABLET    Take 10 mg by mouth as needed    ONDANSETRON (ZOFRAN) 4 MG TABLET    Take 1 tablet by mouth every 4 hours as needed for Nausea or Vomiting    PANTOPRAZOLE (PROTONIX) 40 MG TABLET    Take 1 tablet by mouth daily    SIMVASTATIN (ZOCOR) 40 MG TABLET    Take 40 mg by mouth nightly     VITAMIN C (ASCORBIC ACID) 500 MG TABLET    Take 500 mg by mouth daily    ZOLPIDEM (AMBIEN) 5 MG TABLET    2.5 mg. ALLERGIES     Patient has no known allergies. FAMILYHISTORY       Family History   Problem Relation Age of Onset   Ardyth Moritz Cancer Mother     Dementia Mother     Stroke Father           SOCIAL HISTORY       Social History     Tobacco Use    Smoking status: Former Smoker     Packs/day: 1.00     Years: 35.00     Pack years: 35.00     Types: Cigarettes     Quit date: 1988     Years since quittin.6    Smokeless tobacco: Never Used   Substance Use Topics    Alcohol use: Yes     Comment: beer occasionally    Drug use: No       SCREENINGS    Louis Coma Scale  Eye Opening: Spontaneous  Best Verbal Response: Confused  Best Motor Response: Obeys commands  Louis Coma Scale Score: 14        PHYSICAL EXAM    (up to 7 for level 4, 8 or more for level 5)     ED Triage Vitals [22 1130]   BP Temp Temp src Pulse Resp SpO2 Height Weight   128/73 -- -- 84 17 96 % 5' 10\" (1.778 m) 125 lb (56.7 kg)       Physical Exam  Vitals and nursing note reviewed. Constitutional:       General: He is awake. Appearance: Normal appearance. He is well-developed and normal weight. HENT:      Head: Normocephalic and atraumatic. Right Ear: Decreased hearing noted. Left Ear: Decreased hearing noted. Ears:      Comments: Wears hearing aides     Nose: Nose normal.   Eyes:      General:         Right eye: No discharge. Left eye: No discharge. Neck:     Cardiovascular:      Rate and Rhythm: Normal rate. Rhythm irregular. Heart sounds: Normal heart sounds. Comments: A-fib  Pulmonary:      Effort: Pulmonary effort is normal. No respiratory distress. Breath sounds: Normal breath sounds. Chest:       Abdominal:      General: Bowel sounds are normal.      Palpations: Abdomen is soft. Tenderness: There is no abdominal tenderness. Musculoskeletal:         General: Normal range of motion. Right shoulder: Normal.      Left shoulder: Normal.      Right elbow: Normal.      Left elbow: Normal.      Cervical back: Full passive range of motion without pain and normal range of motion. No spinous process tenderness or muscular tenderness. Thoracic back: Normal. No spasms, tenderness or bony tenderness. Lumbar back: Normal. No tenderness or bony tenderness. Negative right straight leg raise test and negative left straight leg raise test.      Right hip: No deformity, tenderness or bony tenderness. Normal range of motion. Normal strength. Left hip: No deformity, tenderness or bony tenderness. Normal range of motion. Normal strength. Right upper leg: Normal.      Left upper leg: Normal.      Right lower le+ Edema present. Left lower le+ Edema present. Right ankle: Normal.      Left ankle: Normal.   Skin:     General: Skin is warm and dry. Coloration: Skin is not pale. Neurological:      Mental Status: He is alert and oriented to person, place, and time. Psychiatric:         Behavior: Behavior normal. Behavior is cooperative.          DIAGNOSTIC 04/12/22 1330   BP: 128/73 120/79 124/77   Pulse: 84 76 75   Resp: 17 18 19   SpO2: 96% 92% 96%   Weight: 125 lb (56.7 kg)     Height: 5' 10\" (1.778 m)         Patient was given the following medications:  Medications   rivaroxaban (XARELTO) tablet 15 mg (has no administration in time range)           Care of this patient took place during the COVID-19 pandemic emergency. ED COURSE & MEDICAL DECISION MAKING    - The patient presented to the ER with complaints of fall. Vital signs were reviewed. Exam well-developed, well-nourished male who appears uncomfortable. Peripheral IV placed. Labs, Imaging ordered. - Pertinent Labs & Imaging studies reviewed. (See chart for details)   -  Patient seen and evaluated in the emergency department. -  Triage and nursing notes reviewed and incorporated. -  Old chart records reviewed and incorporated. -  RODOLFO. I have evaluated this patient. My supervising physician was available for consultation.  -  Differential diagnosis includes: CVA, TIA, ICH, SAH, aneurysm, dissection, meningitis, glioblastoma, meningioma, metabolic encephalopathy, VTE, SDH, dehydration, sepsis, COVID-19  -  Work-up included:  See above  -  ED treatment included:  xarelto  -  Results discussed with patient. Salas Collins is a 22-year-old male brought to the emergency department from home with complaints of a fall that occurred this morning. Patient said he got out of bed and thinks he started walking too quickly and lost his balance and fell to the right side. He is currently complaining of pain into the right hip. He has had difficulty weightbearing ever since. He lives at home with his wife who helps take care of him. Patient does note a recent diagnosis of stomach cancer and is currently undergoing chemo as he had his fourth round scheduled for this afternoon. CBC with RBC 3.8, hemoglobin 11.7, hematocrit 35.6, RDW 18.7.   CMP with glucose 126, calcium 8.1, total protein 4.7, albumin 2.5, AST 42.  UA with small bili, trace ketones, trace protein. CT head without contrast shows no acute or cranial abnormality. Moderate sensory changes with parenchymal volume loss and chronic small vessel ischemic changes. X-ray right hip shows no acute abnormality noted. Venous duplex of the right lower extremity shows an acute partially occluded DVT involving the right posterior tibial vein zone 6. Acute totally occluding DVT involving the right peroneal veins and 6. Indeterminate age totally occluding DVT involving the right popliteal vein. No other evidence of DVT or superficial vein thrombosis involving the right lower extremity in the left common femoral vein. These results were called to me by vascular tech. Patient was informed on these results and need for admission for further testing and treatment. His wife said he has progressively been getting weaker over the past coming weeks and having more frequent falls. Hospitalist Dr. Gina Colon was consulted and admission orders are placed      FINAL IMPRESSION      1. Acute deep vein thrombosis (DVT) of right peroneal vein (HCC)    2. General weakness    3. Fall in home, initial encounter    4. Hip pain, right    5. Malignant neoplasm of esophagus, unspecified location ([address]    6.  Acute deep vein thrombosis (DVT) of tibial vein of right lower extremity ([address]          DISPOSITION/PLAN   DISPOSITION    Admission         (Please note that portions of this note were completed with a voice recognition program.  Efforts were made to edit the dictations but occasionally words are mis-transcribed.)    KENDELL Mix CNP (electronically signed)           KENDELL Mix CNP  04/12/22 9686

## 2022-04-13 PROBLEM — E43 SEVERE MALNUTRITION (HCC): Chronic | Status: ACTIVE | Noted: 2022-04-13

## 2022-04-13 LAB
ANION GAP SERPL CALCULATED.3IONS-SCNC: 14 MMOL/L (ref 3–16)
APTT: 56.5 SEC (ref 26.2–38.6)
APTT: 60.2 SEC (ref 26.2–38.6)
APTT: 62.3 SEC (ref 26.2–38.6)
BASOPHILS ABSOLUTE: 0 K/UL (ref 0–0.2)
BASOPHILS RELATIVE PERCENT: 0.5 %
BUN BLDV-MCNC: 23 MG/DL (ref 7–20)
CALCIUM SERPL-MCNC: 8.2 MG/DL (ref 8.3–10.6)
CHLORIDE BLD-SCNC: 105 MMOL/L (ref 99–110)
CO2: 21 MMOL/L (ref 21–32)
CORTISOL TOTAL: 27.6 UG/DL
CREAT SERPL-MCNC: 0.8 MG/DL (ref 0.8–1.3)
EOSINOPHILS ABSOLUTE: 0 K/UL (ref 0–0.6)
EOSINOPHILS RELATIVE PERCENT: 0.3 %
GFR AFRICAN AMERICAN: >60
GFR NON-AFRICAN AMERICAN: >60
GLUCOSE BLD-MCNC: 100 MG/DL (ref 70–99)
HCT VFR BLD CALC: 36.5 % (ref 40.5–52.5)
HCT VFR BLD CALC: 37.1 % (ref 40.5–52.5)
HCT VFR BLD CALC: 37.7 % (ref 40.5–52.5)
HCT VFR BLD CALC: 40.1 % (ref 40.5–52.5)
HEMOGLOBIN: 11.7 G/DL (ref 13.5–17.5)
HEMOGLOBIN: 12 G/DL (ref 13.5–17.5)
HEMOGLOBIN: 12.1 G/DL (ref 13.5–17.5)
HEMOGLOBIN: 13.4 G/DL (ref 13.5–17.5)
LYMPHOCYTES ABSOLUTE: 1.5 K/UL (ref 1–5.1)
LYMPHOCYTES RELATIVE PERCENT: 24.8 %
MCH RBC QN AUTO: 30.3 PG (ref 26–34)
MCHC RBC AUTO-ENTMCNC: 32.1 G/DL (ref 31–36)
MCV RBC AUTO: 94.5 FL (ref 80–100)
MONOCYTES ABSOLUTE: 0.7 K/UL (ref 0–1.3)
MONOCYTES RELATIVE PERCENT: 12.2 %
NEUTROPHILS ABSOLUTE: 3.7 K/UL (ref 1.7–7.7)
NEUTROPHILS RELATIVE PERCENT: 62.2 %
PDW BLD-RTO: 18.7 % (ref 12.4–15.4)
PLATELET # BLD: 250 K/UL (ref 135–450)
PMV BLD AUTO: 6.9 FL (ref 5–10.5)
POTASSIUM REFLEX MAGNESIUM: 4 MMOL/L (ref 3.5–5.1)
RBC # BLD: 3.86 M/UL (ref 4.2–5.9)
SODIUM BLD-SCNC: 140 MMOL/L (ref 136–145)
TSH REFLEX: 0.89 UIU/ML (ref 0.27–4.2)
WBC # BLD: 5.9 K/UL (ref 4–11)

## 2022-04-13 PROCEDURE — 36415 COLL VENOUS BLD VENIPUNCTURE: CPT

## 2022-04-13 PROCEDURE — 97530 THERAPEUTIC ACTIVITIES: CPT

## 2022-04-13 PROCEDURE — 85730 THROMBOPLASTIN TIME PARTIAL: CPT

## 2022-04-13 PROCEDURE — 97535 SELF CARE MNGMENT TRAINING: CPT

## 2022-04-13 PROCEDURE — 2580000003 HC RX 258: Performed by: INTERNAL MEDICINE

## 2022-04-13 PROCEDURE — 85025 COMPLETE CBC W/AUTO DIFF WBC: CPT

## 2022-04-13 PROCEDURE — 84443 ASSAY THYROID STIM HORMONE: CPT

## 2022-04-13 PROCEDURE — 85014 HEMATOCRIT: CPT

## 2022-04-13 PROCEDURE — 6370000000 HC RX 637 (ALT 250 FOR IP): Performed by: INTERNAL MEDICINE

## 2022-04-13 PROCEDURE — 97116 GAIT TRAINING THERAPY: CPT

## 2022-04-13 PROCEDURE — 82533 TOTAL CORTISOL: CPT

## 2022-04-13 PROCEDURE — 85018 HEMOGLOBIN: CPT

## 2022-04-13 PROCEDURE — 97162 PT EVAL MOD COMPLEX 30 MIN: CPT

## 2022-04-13 PROCEDURE — 97166 OT EVAL MOD COMPLEX 45 MIN: CPT

## 2022-04-13 PROCEDURE — 80048 BASIC METABOLIC PNL TOTAL CA: CPT

## 2022-04-13 PROCEDURE — 6360000002 HC RX W HCPCS: Performed by: INTERNAL MEDICINE

## 2022-04-13 PROCEDURE — 1200000000 HC SEMI PRIVATE

## 2022-04-13 RX ORDER — LOPERAMIDE HYDROCHLORIDE 2 MG/1
2 CAPSULE ORAL 4 TIMES DAILY PRN
Status: DISCONTINUED | OUTPATIENT
Start: 2022-04-13 | End: 2022-04-22 | Stop reason: HOSPADM

## 2022-04-13 RX ADMIN — LOPERAMIDE HYDROCHLORIDE 2 MG: 2 CAPSULE ORAL at 08:46

## 2022-04-13 RX ADMIN — HEPARIN SODIUM 20.11 UNITS/KG/HR: 5000 INJECTION, SOLUTION INTRAVENOUS; SUBCUTANEOUS at 20:23

## 2022-04-13 RX ADMIN — ATORVASTATIN CALCIUM 10 MG: 10 TABLET, FILM COATED ORAL at 22:41

## 2022-04-13 RX ADMIN — HEPARIN SODIUM 20 UNITS/KG/HR: 5000 INJECTION, SOLUTION INTRAVENOUS; SUBCUTANEOUS at 02:50

## 2022-04-13 RX ADMIN — ATENOLOL 50 MG: 50 TABLET ORAL at 08:46

## 2022-04-13 RX ADMIN — HEPARIN SODIUM 2270 UNITS: 1000 INJECTION INTRAVENOUS; SUBCUTANEOUS at 02:47

## 2022-04-13 RX ADMIN — LOPERAMIDE HYDROCHLORIDE 2 MG: 2 CAPSULE ORAL at 18:04

## 2022-04-13 RX ADMIN — SODIUM CHLORIDE, PRESERVATIVE FREE 10 ML: 5 INJECTION INTRAVENOUS at 22:41

## 2022-04-13 RX ADMIN — LISINOPRIL 5 MG: 5 TABLET ORAL at 08:47

## 2022-04-13 ASSESSMENT — PAIN SCALES - GENERAL
PAINLEVEL_OUTOF10: 0

## 2022-04-13 NOTE — PROGRESS NOTES
[facility]    4/13/2022 11:50 AM        Name: Madelyn Robertson . Admitted: 4/12/2022  Primary Care Provider: Terry Thurman MD (Tel: [phone])        Subjective:    Lying bed still feeling weak no nausea vomiting or chest pain    Reviewed interval ancillary notes    Current Medications  loperamide (IMODIUM) capsule 2 mg, 4x Daily PRN  atenolol (TENORMIN) tablet 50 mg, Daily  lisinopril (PRINIVIL;ZESTRIL) tablet 5 mg, Daily  atorvastatin (LIPITOR) tablet 10 mg, Nightly  sodium chloride flush 0.9 % injection 5-40 mL, 2 times per day  sodium chloride flush 0.9 % injection 5-40 mL, PRN  0.9 % sodium chloride infusion, PRN  ondansetron (ZOFRAN-ODT) disintegrating tablet 4 mg, Q8H PRN   Or  ondansetron (ZOFRAN) injection 4 mg, Q6H PRN  polyethylene glycol (GLYCOLAX) packet 17 g, Daily PRN  acetaminophen (TYLENOL) tablet 650 mg, Q6H PRN   Or  acetaminophen (TYLENOL) suppository 650 mg, Q6H PRN  heparin (porcine) injection 4,540 Units, PRN  heparin (porcine) injection 2,270 Units, PRN  heparin 25,000 units in dextrose 5% 250 mL (premix) infusion, Continuous        Objective:  BP (!) 145/77   Pulse 83   Temp 97.6 °F (36.4 °C) (Oral)   Resp 18   Ht 5' 10\" (1.778 m)   Wt 125 lb 9.6 oz (57 kg)   SpO2 98%   BMI 18.02 kg/m²     Intake/Output Summary (Last 24 hours) at 4/13/2022 1150  Last data filed at 4/13/2022 0730  Gross per 24 hour   Intake --   Output 250 ml   Net -250 ml      Wt Readings from Last 3 Encounters:   04/13/22 125 lb 9.6 oz (57 kg)   02/15/22 121 lb 4.1 oz (55 kg)   02/02/22 156 lb (70.8 kg)     General appearance:  Appears comfortable.  AAOx3  HEENT: atraumatic, Pupils equal, muscous membranes moist, no masses appreciated  Cardiovascular: Regular rate and rhythm no murmurs appreciated  Respiratory: CTAB no wheezing  Gastrointestinal: Abdomen soft, non-tender, BS+  EXT: RLE>LLE swelling  Neurology: no gross focal deficts  Psychiatry: Appropriate affect. Not agitated  Skin: Warm, dry, no rashes appreciated    Labs and Tests:  CBC:   Recent Labs     04/12/22  1250 04/12/22  1250 04/12/22  1644 04/12/22  1644 04/12/22  2109 04/13/22  0429 04/13/22  0819   WBC 7.6  --  6.5  --   --  5.9  --    HGB 11.7*   < > 12.3*   < > 12.7* 11.7* 13.4*     --  256  --   --  250  --     < > = values in this interval not displayed. BMP:    Recent Labs     04/12/22  1250 04/13/22  0429    140   K 4.0 4.0    105   CO2 22 21   BUN 26* 23*   CREATININE 0.9 0.8   GLUCOSE 126* 100*     Hepatic:   Recent Labs     04/12/22  1250   AST 42*   ALT 21   BILITOT 0.4   ALKPHOS 100     VL Extremity Venous Right         CT Head WO Contrast   Final Result   No acute intracranial abnormality. Moderate senescent changes with parenchymal volume loss and chronic small   vessel ischemic changes. XR HIP 2-3 VW W PELVIS RIGHT   Final Result   1. No acute abnormality. Recent imaging reviewed    Problem List  Principal Problem:    DVT femoral (deep venous thrombosis) with thrombophlebitis, bilateral (HCC)  Resolved Problems:    * No resolved hospital problems.  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

## 2022-04-13 NOTE — PROGRESS NOTES
Pt. To room 3319 from ED via wheelchair. Pt. VSS, patient denies pain, patient oriented to room. Pt. Updated on POC, denies questions. Pt. Given toiletries, denies further needs. Pt taken to bathroom SBA with cane. Pt able to have BM and urinated 150mL. Bed in lowest position, bed alarm activated, call light and bedside table within reach. Will continue to monitor.

## 2022-04-13 NOTE — CONSULTS
Pharmacy to check patient copays for Eliquis/Xarelto:    Copay for patient will be: $150.52/month for Xarelto and $153.61 for Eliquis. Patient is eligible for discount cards so $10/fill with copay card. Pharmacy will continue to follow the decision for anticoagulation and  the patient if appropriate.        Jimmy JaimesD, BCPS  Clinical Pharmacist  [id number]

## 2022-04-13 NOTE — CARE COORDINATION
Discharge Planning Assessment  Discharge Planning Assessment  RN/SW discharge planner met with patient/ (and family member- spouse to discuss reason for admission, current living situation, and potential needs at the time of discharge    Demographics/Insurance verified Yes- [facility]    Current type of dwelling ; one level home, with 12 steps with railings to get in    Patient from ECF/SW confirmed with: N/A    Living arrangements: Lives with spouse    Level of function/Support: Needs assistance with ADLs, due to weakness, on chemo- last chemo- 3/22/22     PCP:  Dr Roberta Walsh     Last Visit to PCP:  3 months ago    DME:  [employer] with any community resources/agencies/skilled home care: No    Medication compliance issues: denies issues    Financial issues that could impact healthcare: No    Tentative discharge plan: home with hhc vs ARU/SNF    Discussed and provided facilities of choice if transition to a skilled nursing facility is required at the time of discharge- no      Discussed with patient and/or family that on the day of discharge home tentative time of discharge will be between 10 AM and noon.     Transportation at the time of discharge: TBD on D/C

## 2022-04-13 NOTE — PROGRESS NOTES
RN not able to draw blood from port accessed in pt's R chest from ER. RN called second RN in to assess port. Second RN removed needle and noted the needle was bent. Second RN was unable to get blood return upon re-accessing port. Port was, again, de-accessed, and PIV was placed in left [facility] to run necessary medications. New PIV is CDI, heparin and LR have been y-sited and are running at the moment. Night shift RN to call oncology charge nurse to come and take a look at port.

## 2022-04-13 NOTE — PROGRESS NOTES
4/12/22 PM:  Patient very impulsive. Bed alarm on but patient continues to stand and unplug IV to ambulate to BR. Oncology RN unable to look at R upper chest port-a-cath this shift, will ask to be done on day shift 4/13. H/H q6h with last being 11.7/36.5, next due at 1030. Heparin gtt with current rate of 11.3 ms/hr (20u/kg/hr), next PTT due at 0845. Ambulates with CGA with IV pole and cane. Loose stool overnight.

## 2022-04-13 NOTE — CONSULTS
[facility]   CONSULT NOTE    4/13/2022 8:19 AM    Patient Information: Jovi Baeza   Date of Admit:  4/12/2022  Primary Care Physician:  Tamia Jimenez MD  Requesting Physician:  Teresa Gonzalez MD    Reason for consult:   Evaluation and recommendations for DVT, metastatic gastroesophageal cancer. Chief complaint:    Chief Complaint   Patient presents with    Fall     pt. fell at home and cx: of Right Hip pain       History of Present Illness:  Jovi Baeza is a 78 y.o. male on Teresa Gonzalez MD service who was admitted on 4/12/2022 for acute DVT of right lower extremity. He came to the ED yesterday after falling a few times at home. CT head was negative for acute findings. He presented with some swelling and discomfort of right lower extremity. Venous doppler was done and revealed totally occluding deep vein thrombosis involving the right peroneal vein and right popliteal vein. He was started on heparin gtt. He has a history of atrial fibrillation, but has not been on any anticoagulation due to GI bleeding in February of this year. He is patient of Dr. 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 He is on active treatment with FOLFOX plus trastuzumab and pembrolizumab. Last treatment of trastuzumab and pembrolizumab only was given on 3/29/2022. He was due for FOLFOX treatment on 4/5/2022, but it was held due to neutropenia. He is feeling well this morning. Denies any chest pain or shortness of breath. Still complains of chronic right hip pain. Past Medical History:     has a past medical history of Allergic rhinitis, Anxiety state, CAD (coronary artery disease), Cataract, Chronic ischemic heart disease, Esophageal reflux, Essential hypertension, Glaucoma, Influenza, Insomnia, and Other and unspecified hyperlipidemia.      Past Surgical History:    Past Surgical History: Procedure Laterality Date    CHOLECYSTECTOMY, LAPAROSCOPIC  01/17/2018    LAPAROSCOPIC CHOLECYSTECTOMY WITH CHOLANGIOGRAM    COLONOSCOPY      total of 3, first one polyps, 2nd and 3rd no polyps    COLONOSCOPY N/A 2/2/2022    COLONOSCOPY POLYPECTOMY SNARE/COLD BIOPSY performed by Mario Washington MD at [facility]      Dr Osman Gay    ERCP  01/16/2018    EYE SURGERY      bilateral cataracts removed, \"floaters\"    HERNIA REPAIR      bilateral inguinal hernia repairs    IR PORT PLACEMENT EQUAL OR GREATER THAN 5 YEARS  2/15/2022    IR PORT PLACEMENT EQUAL OR GREATER THAN 5 YEARS 2/15/2022 Antonietta Zimmer MD [facility] SPECIAL PROCEDURES    UPPER GASTROINTESTINAL ENDOSCOPY N/A 2/2/2022    EGD BIOPSY performed by Mario Washington MD at [address] [facility] [facility] ENDOSCOPY        Current Medications:     atenolol  50 mg Oral Daily    lisinopril  5 mg Oral Daily    atorvastatin  10 mg Oral Nightly    sodium chloride flush  5-40 mL IntraVENous 2 times per day       Allergies:    No Known Allergies     Social History:    reports that he quit smoking about 33 years ago. His smoking use included cigarettes. He has a 35.00 pack-year smoking history. He has never used smokeless tobacco. He reports current alcohol use. He reports that he does not use drugs. Family History:     family history includes Cancer in his mother; Dementia in his mother; Stroke in his father.      ROS:      Per HPI; otherwise 10 point ROS is negative    PHYSICAL EXAM:    Vitals:  Vitals:    04/13/22 0430   BP: (!) 148/64   Pulse: 87   Resp: 18   Temp: 98.3 °F (36.8 °C)   SpO2: 97%        Intake/Output Summary (Last 24 hours) at 4/13/2022 0819  Last data filed at 4/13/2022 0730  Gross per 24 hour   Intake --   Output 250 ml   Net -250 ml      Wt Readings from Last 3 Encounters:   04/13/22 125 lb 9.6 oz (57 kg)   02/15/22 121 lb 4.1 oz (55 kg)   02/02/22 156 lb (70.8 kg)        General appearance: Appears comfortable. Well nourished  Eyes: Sclera clear, pupils equal  ENT: Moist mucus membranes, no thrush  Neck: Trachea midline, symmetrical  Cardiovascular: Regular rhythm, normal S1, S2. No murmur, gallop, rub. Respiratory: Clear to auscultation bilaterally. No wheeze. Good inspiratory effort  Gastrointestinal: Abdomen soft, not tender, not distended, normal bowel sounds  Musculoskeletal: No cyanosis in digits, warm extremities. +1 edema LLE, +2 edema RLE. Neurologic: Cranial nerves grossly intact, no motor or speech deficits. Psychiatric: Normal affect. Alert and oriented to time, place and person. Skin: Warm, dry, normal turgor, no rash    DATA:  CBC:   Lab Results   Component Value Date    WBC 5.9 04/13/2022    RBC 3.86 04/13/2022    HGB 11.7 04/13/2022    HCT 36.5 04/13/2022    MCV 94.5 04/13/2022    MCH 30.3 04/13/2022    MCHC 32.1 04/13/2022    RDW 18.7 04/13/2022     04/13/2022    MPV 6.9 04/13/2022     BMP:  Lab Results   Component Value Date     04/13/2022    K 4.0 04/13/2022     04/13/2022    CO2 21 04/13/2022    BUN 23 04/13/2022    CREATININE 0.8 04/13/2022    CALCIUM 8.2 04/13/2022    GFRAA >60 04/13/2022    GFRAA >60 04/12/2013    LABGLOM >60 04/13/2022    GLUCOSE 100 04/13/2022     Magnesium:   Lab Results   Component Value Date    MG 1.90 02/02/2022    MG 1.90 01/16/2018    MG 2.10 01/15/2018     LIVER PROFILE:   Recent Labs     04/12/22  1250   AST 42*   ALT 21   BILITOT 0.4   ALKPHOS 100     PT/INR:    Lab Results   Component Value Date    PROTIME 13.1 02/15/2022    PROTIME 13.2 02/01/2022    PROTIME 12.8 01/15/2018    INR 1.15 02/15/2022    INR 1.16 02/01/2022    INR 1.13 01/15/2018       IMPRESSION/RECOMMENDATIONS:    Principal Problem:    DVT femoral (deep venous thrombosis) with thrombophlebitis, bilateral (HCC)  Resolved Problems:    * No resolved hospital problems. *       77-year-old male with a history of atrial fibrillation, HTN, GERD, and CAD who has:    1.  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 - Last treatment of trastuzumab and pembrolizumab only was given on 3/29/2022.   - He was due for FOLFOX treatment on 4/5/2022, but it was held due to neutropenia.   - Hold chemotherapy while inpatient    2. Acute DVT of right lower extremity  - Venous doppler showed totally occluding deep vein thrombosis involving the right peroneal vein and right popliteal vein  - Hx of atrial fibrillation, but has not been on anticoagulation d/t previous GI bleed  - on Heparin gtt    3. Right hip pain  - MRI hip on 3/31/2022 revealed metastatic lesion with no pathological fracture. - He has been referred to oncology orthopedic surgery. If no surgical intervention is recommended, may consider palliative radiation to hip. This plan was discussed with the patient and he/she verbalized understanding. Thank you for allowing us to participate in the care of this patient. KENDELL Ramos CNP  [facility] 13  [phone]    The patient was seen and examined. Agree with above. Currently on IV heparin for right lower extremity acute DVT. We will look into DOAC coverage. We will recheck TSH and cortisol As he has ongoing fatigue.     Sarah Lau MD

## 2022-04-13 NOTE — PROGRESS NOTES
Nutrition Note    RECOMMENDATIONS  1. PO Diet: continue regular  2. ONS: Ensure Enlive BID  3. OTHER:    a. Consider swallow eval   b. Consider appetite stimulant    NUTRITION ASSESSMENT   Nutrition evaluation trigged based on admission MST of 2, indicating wt loss and poor po intake prior to admission. Pt reports for at least the past month, he has experienced a decrease in appetite, not hungry, and food just doesn't sound good. C/o some difficulty swallowing, states food doesn't doesn't go down easy all the time. Currently on a regular diet and ate 1 pancake and 50% of the yogurt this am for breafkast.  Pt's wife states he was drinking about 1/2 can of Ensure at home. Agreed to receive while here. Admission wt was 125 lb. Pt reports most recent wt of 142 lb and UBW of 162/165lb. Wt 2/2/22- 156 lb and weight a year ago (4/28/21) was 165 lb, indicating a 40 lb / 24% loss x 1year. Nutrition plan: begin Ensure Enlive BID. Consider appetite stimulant and swallow evalation.         Nutrition Related Findings:  acute DVT of right lower extremity; LR at 50 L/hr; +3 pitting edema BLE; loose BM 4/13 (nl per pt)   Wounds: None   Nutrition Education:  Education completed (increasing kcal & protein at home)    Nutrition Goals: PO intake 50% or greater of meals and supplement       MALNUTRITION ASSESSMENT   Context of Malnutrition: Chronic Illness   Malnutrition Status: Severe malnutrition  Findings of the 6 clinical characteristics of malnutrition:  Energy Intake:  7 - 75% or less estimated energy requirements for 1 month or longer  Weight Loss:  7 - Greater than 20% over 1 year     Body Fat Loss:  1 - Mild body fat loss Triceps   Muscle Mass Loss:  1 - Mild muscle mass loss Clavicles (pectoralis & deltoids),Temples (temporalis),Hand (interosseous)  Fluid Accumulation:  7 - Severe Extremities (+3 pitting BLE)   Strength:  Not Performed      NUTRITION DIAGNOSIS   · Severe malnutrition related to inadequate protein-energy intake,catabolic illness as evidenced by poor intake prior to admission,weight loss greater than or equal to 20% in 1 year,mild loss of subcutaneous fat,mild muscle loss      CURRENT NUTRITION THERAPIES  ADULT DIET; Regular     PO Intake: 26-50%   PO Supplement Intake:None Ordered      ANTHROPOMETRICS   Current Height: 5' 10\" (177.8 cm)   Current Weight: 125 lb 9.6 oz (57 kg)     Ideal Body Weight (IBW): 166 lbs  (75 kg)        BMI: 17.9      COMPARATIVE STANDARDS  Energy (kcal):  1710 - 1995     Protein (g):  86 - 103       Fluid (ml/day):  1710 - 1995    The patient will be monitored per nutrition standards of care. Consult dietitian if additional nutrition interventions are needed prior to RD reassessment.      Mari Childers RD, LD    Contact: [phone]

## 2022-04-13 NOTE — PLAN OF CARE
Nutrition Problem #1: Severe malnutrition  Intervention: Food and/or Nutrient Delivery: Continue Current Diet,Start Oral Nutrition Supplement  Nutritional Goals: PO intake 50% or greater of meals and supplement

## 2022-04-13 NOTE — PROGRESS NOTES
Occupational Therapy   Occupational Therapy Initial Assessment  Date: 2022   Patient Name: Ligia Fry  MRN: [id number]     : 1940    Date of Service: 2022    Discharge Recommendations:  Ligia Fry scored a 16/24 on the AM-PAC ADL Inpatient form. Current research shows that an AM-PAC score of 17 or less is typically not associated with a discharge to the patient's home setting. Based on the patient's AM-PAC score and their current ADL deficits, it is recommended that the patient have 3-5 sessions per week of Occupational Therapy at d/c to increase the patient's independence. Please see assessment section for further patient specific details. If patient discharges prior to next session this note will serve as a discharge summary. Please see below for the latest assessment towards goals. OT Equipment Recommendations  Equipment Needed: Yes  Other: defer to next level    Assessment   Performance deficits / Impairments: Decreased functional mobility ; Decreased endurance;Decreased ADL status; Decreased balance;Decreased safe awareness;Decreased high-level IADLs;Decreased strength  Assessment: Patient presents below baseline function. Continued OT services are indicated in order to address above deficits and maximize patients safety and independence in Adls, transfers and functional mobility. at this time patient would not be safe to return home. Treatment Diagnosis: Decreased ADLs, transfers and functional mobility  Prognosis: Fair  Decision Making: Medium Complexity  OT Education: OT Role;Plan of Care  Patient Education: eval/dc recs. patient and wife verbalized understanding  REQUIRES OT FOLLOW UP: Yes  Activity Tolerance  Activity Tolerance: Patient limited by fatigue  Safety Devices  Safety Devices in place: Yes  Type of devices: All fall risk precautions in place; Left in bed;Bed alarm in place;Nurse notified;Call light within reach           Patient Diagnosis(es): The primary encounter diagnosis was Acute deep vein thrombosis (DVT) of right peroneal vein ([address] Diagnoses of General weakness, Fall in home, initial encounter, Hip pain, right, Malignant neoplasm of esophagus, unspecified location ([address] and Acute deep vein thrombosis (DVT) of tibial vein of right lower extremity ([address] were also pertinent to this visit. has a past medical history of Allergic rhinitis, Anxiety state, CAD (coronary artery disease), Cataract, Chronic ischemic heart disease, Esophageal reflux, Essential hypertension, Glaucoma, Influenza, Insomnia, and Other and unspecified hyperlipidemia. has a past surgical history that includes Coronary angioplasty with stent; eye surgery; hernia repair; Colonoscopy; ERCP (01/16/2018); Cholecystectomy, laparoscopic (01/17/2018); Upper gastrointestinal endoscopy (N/A, 2/2/2022); Colonoscopy (N/A, 2/2/2022); and IR PORT PLACEMENT > 5 YEARS (2/15/2022). Treatment Diagnosis: Decreased ADLs, transfers and functional mobility      Restrictions  Restrictions/Precautions  Restrictions/Precautions: Fall Risk,Up as Tolerated (High fall risk)  Required Braces or Orthoses?: No  Position Activity Restriction  Other position/activity restrictions: Giovani Speaker is a 78 y.o. male in February with acute GI bleed required 1 unit of PRBCs had a EGD and colonoscopy which showed small colon polyps and extensive esophageal tumors found to be adenocarcinoma of the.  Patient has been having chemotherapy for this today patient felt that his wife called EMS patient had 3 falls over the last couple weeks patient has become extremely weak poor p.o. intake no chest pain no lightheadedness or dizziness.   Patient had increased right lower extremity swelling compared to left extremity had ultrasound performed which showed right lower extremity DVT    Subjective   General  Chart Reviewed: Yes  Patient assessed for rehabilitation services?: Yes  Family / Caregiver Present: Yes (wife present for session)  Diagnosis: DVT  Subjective  Subjective: Patient in bed upon arrival, agreeable to evaluation. Denied pain. Patient reported that he doesnt feel he can go home at this time and that his wife is unable to care for him. General Comment  Comments: Patient left in bed with all needs met. patient declining to stay up in chair due to discomfort on bottom  Patient Currently in Pain: Denies    Social/Functional History  Social/Functional History  Lives With: Spouse  Type of Home: House  Home Layout: Two level (Enter through the basement)  Home Access: Stairs to enter with rails  Entrance Stairs - Number of Steps: 12  Entrance Stairs - Rails: Both  Bathroom Shower/Tub: Tub/Shower unit  Bathroom Equipment: Grab bars in shower,Hand-held shower  Bathroom Accessibility: Accessible  Home Equipment: Rolling walker,Cane  ADL Assistance: Independent (Pt reports getting very fatigued with dressing and bathing)  Homemaking Assistance: Needs assistance  Homemaking Responsibilities: No  Ambulation Assistance: Independent (Uses cane)  Transfer Assistance: Independent  Active : No  Leisure & Hobbies: Dance  Additional Comments: Three falls in the past 3 weeks       Objective   Vision: Impaired  Vision Exceptions: Wears glasses at all times  Hearing: Exceptions to [facility]  Hearing Exceptions: Bilateral hearing aid    Orientation  Overall Orientation Status: Within Functional Limits  Observation/Palpation  Posture: Fair (Forward head, rounded shoulders, and increased thoracic kyphosis in sitting and standing. Increased trunk flexion and posterior pelvic tilt in standing.)  Balance  Sitting Balance: Stand by assistance  Standing Balance: Contact guard assistance (with RW)  Standing Balance  Time: ~5-6 mins total  Activity: ADLs, transfers and functional mobility  Comment: with CGA  Functional Mobility  Functional - Mobility Device: Rolling Walker  Activity: To/from bathroom; Other (15x2 and 30x2)  Assist Level: Minimal assistance  Functional Mobility Comments: CGA to Eli. Patient required assistance for RW mgmt and Eli when turning  Toilet Transfers  Toilet - Technique: Ambulating  Equipment Used: Grab bars  Toilet Transfer: Minimal assistance  Toilet Transfers Comments: Eli due to lower surface. raised toilet seat added to toilet. ADL  LE Dressing: Minimal assistance  Toileting: Moderate assistance;Maximum assistance  Additional Comments: Patient incontinent upon arrival. patient required maxA for rear zora care while in bed prior to getting out of bed. patient able to void bowel and bladder when on the toilet, requiring increased time. patient able to complete zora care with SBA but required modA for thoroughness. patient required Eli for LB dressing, threading and managing over hips. Patient able to complete hand hygiene at the sink with CGA and RW, patient with slight posterior LOB that was able to self correct. Bed mobility  Supine to Sit: Minimal assistance  Sit to Supine: Contact guard assistance  Scooting: Contact guard assistance  Transfers  Sit to stand: Contact guard assistance  Stand to sit: Contact guard assistance  Transfer Comments: from bed and chair     Cognition  Overall Cognitive Status: Exceptions  Following Commands: Follows one step commands consistently  Insights: Decreased awareness of deficits  Initiation: Requires cues for some  Cognition Comment: Patient required education on using RW verses cane.  patient required some cueing for safety with RW        Sensation  Overall Sensation Status: WFL        LUE AROM (degrees)  LUE AROM : WFL  RUE AROM (degrees)  RUE AROM : WFL                      Plan   Plan  Times per week: 3-5x/wk  Times per day: Daily  Current Treatment Recommendations: Strengthening,Self-Care / ADL,Safety Education & Training,Functional Mobility Training,Patient/Caregiver Education & Training      AM-PAC Score        AM-PAC Inpatient Daily Activity Raw Score: 16 (04/13/22 1630)  AM-PAC Inpatient ADL T-Scale Score : 35.96 (04/13/22 1630)  ADL Inpatient CMS 0-100% Score: 53.32 (04/13/22 1630)  ADL Inpatient CMS G-Code Modifier : CK (04/13/22 1630)    Goals  Short term goals  Time Frame for Short term goals: Discharge  Short term goal 1: Functional transfers SBA  Short term goal 2: Toileting SBA  Short term goal 3: UB ADLs SBA  Short term goal 4: LB ADLs SBA  Short term goal 5: Grooming standing at sink for 5 mins with RW and no LOB  Long term goals  Time Frame for Long term goals : STG=LTG       Therapy Time   Individual Concurrent Group Co-treatment   Time In       1319   Time Out       1418   Minutes       59   Timed Code Treatment Minutes: [address] [place], [place] OTR/L [id number]       Estiven Greco, OT

## 2022-04-13 NOTE — PROGRESS NOTES
Pt incont of loose brown stool, pt states this is chronic for him. Pt cleaned, barrier cream applied, brief and pad changed. Medicated with imodium per md orders.

## 2022-04-13 NOTE — PROGRESS NOTES
Physical Therapy    Facility/Department: [address] NURSING  Initial Assessment    NAME: Annalise Lyle  : 1940  MRN: [id number]    Date of Service: 2022    Discharge Recommendations: Annalise Lyle scored a 17/24 on the AM-PAC short mobility form. Current research shows that an AM-PAC score of 17 or less is typically not associated with a discharge to the patient's home setting. Based on the patient's AM-PAC score and their current functional mobility deficits, it is recommended that the patient have 3-5 sessions per week of Physical Therapy at d/c to increase the patient's independence. Please see assessment section for further patient specific details. If patient discharges prior to next session this note will serve as a discharge summary. Please see below for the latest assessment towards goals. PT Equipment Recommendations  Equipment Needed: No  Other: Defer to next level of care. Assessment   Body structures, Functions, Activity limitations: Decreased functional mobility ; Decreased posture;Decreased endurance;Decreased ROM; Decreased strength;Decreased balance;Decreased safe awareness;Decreased vision/visual deficit  Assessment: Pt presents to the acute hospital with diagnosis of general weakness. Pt prior level of function was independent with all functional mobility. Today, pt required CGA to min A to complete functional mobility tasks and demonstrated fair balance with a posterior LOB in standing. Pt also demonstrated fatigue with all mobility tasks today and required increased rest breaks throughout. Pt is unsafe to return home at this time and would benefit from skilled PT to increase independence and safety with functional mobility. Treatment Diagnosis: Impaired functional mobility  Prognosis: Fair  Decision Making: Medium Complexity  PT Education: Goals;PT Role;Plan of Care;Gait Training;Family Education;General Safety; Functional Mobility Training;Energy Conservation;Transfer Training  Patient Education: Pt verbalized understanding, needs reinforcement. Barriers to Learning: None  REQUIRES PT FOLLOW UP: Yes  Activity Tolerance  Activity Tolerance: Patient limited by fatigue  Activity Tolerance: Pt tolerated the initial evaluation well however demonstrated significant fatigue following gait trials requiring increased rest breaks throughout. Patient Diagnosis(es): The primary encounter diagnosis was Acute deep vein thrombosis (DVT) of right peroneal vein ([address] Diagnoses of General weakness, Fall in home, initial encounter, Hip pain, right, Malignant neoplasm of esophagus, unspecified location ([address] and Acute deep vein thrombosis (DVT) of tibial vein of right lower extremity ([address] were also pertinent to this visit. has a past medical history of Allergic rhinitis, Anxiety state, CAD (coronary artery disease), Cataract, Chronic ischemic heart disease, Esophageal reflux, Essential hypertension, Glaucoma, Influenza, Insomnia, and Other and unspecified hyperlipidemia. has a past surgical history that includes Coronary angioplasty with stent; eye surgery; hernia repair; Colonoscopy; ERCP (01/16/2018); Cholecystectomy, laparoscopic (01/17/2018); Upper gastrointestinal endoscopy (N/A, 2/2/2022); Colonoscopy (N/A, 2/2/2022); and IR PORT PLACEMENT > 5 YEARS (2/15/2022).     Restrictions  Restrictions/Precautions  Restrictions/Precautions: Fall Risk,Up as Tolerated (High fall risk)  Required Braces or Orthoses?: No  Position Activity Restriction  Other position/activity restrictions: Rosalind Vickers is a 78 y.o. male in February with acute GI bleed required 1 unit of PRBCs had a EGD and colonoscopy which showed small colon polyps and extensive esophageal tumors found to be adenocarcinoma of the.  Patient has been having chemotherapy for this today patient felt that his wife called EMS patient had 3 falls over the last couple weeks patient has become extremely weak poor p.o. intake no chest pain no lightheadedness or dizziness. Patient had increased right lower extremity swelling compared to left extremity had ultrasound performed which showed right lower extremity DVT; MRI hip on 3/31/2022 revealed metastatic lesion with no pathological fracture    Vision/Hearing  Vision: Impaired  Vision Exceptions: Wears glasses at all times  Hearing: Exceptions to [facility]  Hearing Exceptions: Bilateral hearing aid       Subjective  General  Chart Reviewed: Yes  Patient assessed for rehabilitation services?: Yes  Response To Previous Treatment: Not applicable  Diagnosis: General weakness  Follows Commands: Within Functional Limits (Requires increased time)  General Comment  Comments: Pt supine in bed on arrival, agreeable to participate in PT/OT initial evaluation  Subjective  Subjective: Pt denied pain  Pain Screening  Patient Currently in Pain: Denies     Orientation  Orientation  Overall Orientation Status: Within Functional Limits     Social/Functional History  Social/Functional History  Lives With: Spouse  Type of Home: House  Home Layout: Two level (Enter through the basement)  Home Access: Stairs to enter with rails  Entrance Stairs - Number of Steps: 12  Entrance Stairs - Rails: Both  Bathroom Shower/Tub: Tub/Shower unit  Bathroom Equipment: Grab bars in shower,Hand-held shower  Bathroom Accessibility: Accessible  Home Equipment: Rolling walker,Cane  ADL Assistance: Independent (Pt reports getting very fatigued with dressing and bathing)  Homemaking Assistance: Needs assistance  Homemaking Responsibilities: No  Ambulation Assistance: Independent (Uses cane)  Transfer Assistance: Independent  Active : No  Leisure & Hobbies: Dance  Additional Comments: Three falls in the past 3 weeks     Cognition   Cognition  Overall Cognitive Status: WFL    Objective  Observation/Palpation  Posture: Fair (Forward head, rounded shoulders, and increased thoracic kyphosis in sitting and standing.  Increased trunk flexion and posterior pelvic tilt in standing.)    PROM RLE (degrees)  RLE General PROM: Grossly WFL except ankle DF to neutraland lacking ~ 10 deg knee extension  AROM RLE (degrees)  RLE General AROM: Grossly WFL except ankle DF to neutral and lacking ~ 10 deg knee extension  Strength RLE  Comment: Not formally assessed, grossly 3/5 as observed during functional mobility  Strength LLE  Comment: Not formally assessed, grossly 3/5 as observed during functional mobility     Sensation  Overall Sensation Status: WFL     Bed mobility  Supine to Sit: Minimal assistance  Sit to Supine: Contact guard assistance  Scooting: Contact guard assistance  Comment: HOB elevated, use of handrail     Transfers  Sit to Stand: Contact guard assistance;Minimal Assistance (CGA from bed and chair, min A from toilet)  Stand to sit: Contact guard assistance;Minimal Assistance (CGA from bed and chair, min A from toilet)  Bed to Chair:  (Chair to bed- CGA with RW, stand pivot transfer)     Ambulation  Surface: level tile  Device: Rolling Walker  Assistance: Minimal assistance  Quality of Gait: Decreased denise, increased trunk flexion throughout gait cycle, normal BRYSON, decreased step length and step height  Distance: 15' x 2, 30' x 2  Comments: Pt demonstrated significant fatigue following ambulation to/from the restroom and required a rest break. Balance  Sitting - Static: Good  Sitting - Dynamic: Good  Standing - Static: Fair  Standing - Dynamic: Fair  Comments: Pt sat EOB statically for ~ 3 minutes with supervision. Pt performed LE AROM/MMT with supervision while sitting EOB. Pt stood at the sink to wash hands with min A with posterior LOB. Pt ambulated wit with RW and CGA.       Plan   Plan  Times per week: 3-5  Times per day: Daily  Current Treatment Recommendations: Arelia Puller Re-education,Patient/Caregiver Education & Training,ROM,Balance [facility] Education & Training,Stair training,Functional Mobility Training  Safety Devices  Type of devices:  All fall risk precautions in place,Call light within reach,Gait belt,Nurse notified,Left in bed,Bed alarm in place  Restraints  Initially in place: No    AM-PAC Score  AM-PAC Inpatient Mobility Raw Score : 17 (04/13/22 1613)  AM-PAC Inpatient T-Scale Score : 42.13 (04/13/22 1613)  Mobility Inpatient CMS 0-100% Score: 50.57 (04/13/22 1613)  Mobility Inpatient CMS G-Code Modifier : CK (04/13/22 1613)     Goals  Short term goals  Time Frame for Short term goals: Upon discharge  Short term goal 1: Pt will transfer supine to/from sit with supervision  Short term goal 2: Pt will transfer sit to/from stand with supervision  Short term goal 3: Pt will amb 76' with RW and supervision  Patient Goals   Patient goals : Pt did not state     Therapy Time   Individual Concurrent Group Co-treatment   Time In       1319   Time Out       1418   Minutes       59   Timed Code Treatment Minutes: [address], [facility]  Sylvie Elizondo, PT    This evaluation/treatment was observed and supervised by Sylvie Elizondo, [address] [place] [address]

## 2022-04-14 LAB
ANION GAP SERPL CALCULATED.3IONS-SCNC: 13 MMOL/L (ref 3–16)
APTT: 51.1 SEC (ref 26.2–38.6)
APTT: 68.7 SEC (ref 26.2–38.6)
BASOPHILS ABSOLUTE: 0.1 K/UL (ref 0–0.2)
BASOPHILS RELATIVE PERCENT: 0.9 %
BUN BLDV-MCNC: 20 MG/DL (ref 7–20)
C DIFF TOXIN/ANTIGEN: NORMAL
CALCIUM SERPL-MCNC: 8.3 MG/DL (ref 8.3–10.6)
CHLORIDE BLD-SCNC: 104 MMOL/L (ref 99–110)
CO2: 21 MMOL/L (ref 21–32)
CORTISOL TOTAL: 29.4 UG/DL
CREAT SERPL-MCNC: 1 MG/DL (ref 0.8–1.3)
EOSINOPHILS ABSOLUTE: 0 K/UL (ref 0–0.6)
EOSINOPHILS RELATIVE PERCENT: 0.2 %
GFR AFRICAN AMERICAN: >60
GFR NON-AFRICAN AMERICAN: >60
GLUCOSE BLD-MCNC: 120 MG/DL (ref 70–99)
HCT VFR BLD CALC: 38.8 % (ref 40.5–52.5)
HCT VFR BLD CALC: 39.8 % (ref 40.5–52.5)
HEMOGLOBIN: 12.8 G/DL (ref 13.5–17.5)
HEMOGLOBIN: 12.9 G/DL (ref 13.5–17.5)
LYMPHOCYTES ABSOLUTE: 2 K/UL (ref 1–5.1)
LYMPHOCYTES RELATIVE PERCENT: 26.2 %
MCH RBC QN AUTO: 30.5 PG (ref 26–34)
MCHC RBC AUTO-ENTMCNC: 33 G/DL (ref 31–36)
MCV RBC AUTO: 92.4 FL (ref 80–100)
MONOCYTES ABSOLUTE: 0.6 K/UL (ref 0–1.3)
MONOCYTES RELATIVE PERCENT: 7.5 %
NEUTROPHILS ABSOLUTE: 4.9 K/UL (ref 1.7–7.7)
NEUTROPHILS RELATIVE PERCENT: 65.2 %
PDW BLD-RTO: 18.9 % (ref 12.4–15.4)
PLATELET # BLD: 297 K/UL (ref 135–450)
PMV BLD AUTO: 6.6 FL (ref 5–10.5)
POTASSIUM REFLEX MAGNESIUM: 3.7 MMOL/L (ref 3.5–5.1)
RBC # BLD: 4.2 M/UL (ref 4.2–5.9)
SODIUM BLD-SCNC: 138 MMOL/L (ref 136–145)
WBC # BLD: 7.5 K/UL (ref 4–11)

## 2022-04-14 PROCEDURE — 1200000000 HC SEMI PRIVATE

## 2022-04-14 PROCEDURE — 85014 HEMATOCRIT: CPT

## 2022-04-14 PROCEDURE — 85730 THROMBOPLASTIN TIME PARTIAL: CPT

## 2022-04-14 PROCEDURE — 87505 NFCT AGENT DETECTION GI: CPT

## 2022-04-14 PROCEDURE — 6370000000 HC RX 637 (ALT 250 FOR IP): Performed by: INTERNAL MEDICINE

## 2022-04-14 PROCEDURE — 87324 CLOSTRIDIUM AG IA: CPT

## 2022-04-14 PROCEDURE — 36415 COLL VENOUS BLD VENIPUNCTURE: CPT

## 2022-04-14 PROCEDURE — 85025 COMPLETE CBC W/AUTO DIFF WBC: CPT

## 2022-04-14 PROCEDURE — 6360000002 HC RX W HCPCS: Performed by: INTERNAL MEDICINE

## 2022-04-14 PROCEDURE — 82533 TOTAL CORTISOL: CPT

## 2022-04-14 PROCEDURE — 87449 NOS EACH ORGANISM AG IA: CPT

## 2022-04-14 PROCEDURE — 2580000003 HC RX 258: Performed by: INTERNAL MEDICINE

## 2022-04-14 PROCEDURE — 85018 HEMOGLOBIN: CPT

## 2022-04-14 PROCEDURE — 80048 BASIC METABOLIC PNL TOTAL CA: CPT

## 2022-04-14 RX ORDER — OXYCODONE HYDROCHLORIDE 5 MG/1
10 TABLET ORAL EVERY 6 HOURS PRN
Status: DISCONTINUED | OUTPATIENT
Start: 2022-04-14 | End: 2022-04-22 | Stop reason: HOSPADM

## 2022-04-14 RX ORDER — LOPERAMIDE HYDROCHLORIDE 2 MG/1
2 CAPSULE ORAL 4 TIMES DAILY PRN
Qty: 10 CAPSULE | Refills: 0 | Status: SHIPPED | OUTPATIENT
Start: 2022-04-14 | End: 2022-04-24

## 2022-04-14 RX ADMIN — LISINOPRIL 5 MG: 5 TABLET ORAL at 09:06

## 2022-04-14 RX ADMIN — GLYCERIN, PETROLATUM, PHENYLEPHRINE HCL, PRAMOXINE HCL 1 EACH: 144; 2.5; 10; 15 CREAM TOPICAL at 13:57

## 2022-04-14 RX ADMIN — LOPERAMIDE HYDROCHLORIDE 2 MG: 2 CAPSULE ORAL at 10:43

## 2022-04-14 RX ADMIN — ATORVASTATIN CALCIUM 10 MG: 10 TABLET, FILM COATED ORAL at 20:48

## 2022-04-14 RX ADMIN — HEPARIN SODIUM 2270 UNITS: 1000 INJECTION INTRAVENOUS; SUBCUTANEOUS at 05:28

## 2022-04-14 RX ADMIN — LOPERAMIDE HYDROCHLORIDE 2 MG: 2 CAPSULE ORAL at 17:30

## 2022-04-14 RX ADMIN — SODIUM CHLORIDE, PRESERVATIVE FREE 10 ML: 5 INJECTION INTRAVENOUS at 20:48

## 2022-04-14 RX ADMIN — APIXABAN 10 MG: 5 TABLET, FILM COATED ORAL at 11:38

## 2022-04-14 RX ADMIN — ATENOLOL 50 MG: 50 TABLET ORAL at 09:06

## 2022-04-14 RX ADMIN — APIXABAN 10 MG: 5 TABLET, FILM COATED ORAL at 20:48

## 2022-04-14 RX ADMIN — GLYCERIN, PETROLATUM, PHENYLEPHRINE HCL, PRAMOXINE HCL 1 EACH: 144; 2.5; 10; 15 CREAM TOPICAL at 20:50

## 2022-04-14 ASSESSMENT — PAIN SCALES - GENERAL
PAINLEVEL_OUTOF10: 0

## 2022-04-14 NOTE — PROGRESS NOTES
[facility]    4/14/2022 6:44 PM        Name: Amber Meadows Admitted: 4/12/2022  Primary Care Provider: Rachael Sofia MD (Tel: [phone])        Subjective:    Lying bed feeling weak vomiting no bleeding hemoglobin is remained stable no chest pain or shortness of    Reviewed interval ancillary notes    Current Medications  apixaban (ELIQUIS) tablet 10 mg, BID   Followed by  Marylu Lofton ON 4/21/2022] apixaban (ELIQUIS) tablet 5 mg, BID  pramox-PE-glycerin-petrolatum cream, BID  oxyCODONE (ROXICODONE) immediate release tablet 10 mg, Q6H PRN  loperamide (IMODIUM) capsule 2 mg, 4x Daily PRN  atenolol (TENORMIN) tablet 50 mg, Daily  lisinopril (PRINIVIL;ZESTRIL) tablet 5 mg, Daily  atorvastatin (LIPITOR) tablet 10 mg, Nightly  sodium chloride flush 0.9 % injection 5-40 mL, 2 times per day  sodium chloride flush 0.9 % injection 5-40 mL, PRN  0.9 % sodium chloride infusion, PRN  ondansetron (ZOFRAN-ODT) disintegrating tablet 4 mg, Q8H PRN   Or  ondansetron (ZOFRAN) injection 4 mg, Q6H PRN  polyethylene glycol (GLYCOLAX) packet 17 g, Daily PRN  acetaminophen (TYLENOL) tablet 650 mg, Q6H PRN   Or  acetaminophen (TYLENOL) suppository 650 mg, Q6H PRN        Objective:  /78   Pulse 80   Temp 98.4 °F (36.9 °C) (Axillary)   Resp 18   Ht 5' 10\" (1.778 m)   Wt 124 lb 4.8 oz (56.4 kg)   SpO2 96%   BMI 17.84 kg/m²     Intake/Output Summary (Last 24 hours) at 4/14/2022 1844  Last data filed at 4/14/2022 1359  Gross per 24 hour   Intake --   Output 525 ml   Net -525 ml      Wt Readings from Last 3 Encounters:   04/14/22 124 lb 4.8 oz (56.4 kg)   02/15/22 121 lb 4.1 oz (55 kg)   02/02/22 156 lb (70.8 kg)     General appearance:  Appears comfortable.  AAOx3  HEENT: atraumatic, Pupils equal, muscous membranes moist, no masses appreciated  Cardiovascular: Regular rate and rhythm no murmurs appreciated  Respiratory: CTAB no wheezing  Gastrointestinal: Abdomen soft, non-tender, BS+  EXT: RLE>LLE swelling  Neurology: no gross focal deficts  Psychiatry: Appropriate affect. Not agitated  Skin: Warm, dry, no rashes appreciated    Labs and Tests:  CBC:   Recent Labs     04/12/22  1644 04/12/22  2109 04/13/22  0429 04/13/22  0819 04/13/22  2131 04/14/22  0308 04/14/22  0919   WBC 6.5  --  5.9  --   --  7.5  --    HGB 12.3*   < > 11.7*   < > 12.0* 12.8* 12.9*     --  250  --   --  297  --     < > = values in this interval not displayed. BMP:    Recent Labs     04/12/22  1250 04/13/22  0429 04/14/22  0308    140 138   K 4.0 4.0 3.7    105 104   CO2 22 21 21   BUN 26* 23* 20   CREATININE 0.9 0.8 1.0   GLUCOSE 126* 100* 120*     Hepatic:   Recent Labs     04/12/22  1250   AST 42*   ALT 21   BILITOT 0.4   ALKPHOS 100     VL Extremity Venous Right         CT Head WO Contrast   Final Result   No acute intracranial abnormality. Moderate senescent changes with parenchymal volume loss and chronic small   vessel ischemic changes. XR HIP 2-3 VW W PELVIS RIGHT   Final Result   1. No acute abnormality. Recent imaging reviewed    Problem List  Principal Problem:    DVT femoral (deep venous thrombosis) with thrombophlebitis, bilateral (HCC)  Active Problems:    Severe malnutrition (HCC)  Resolved Problems:    * No resolved hospital problems.  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

## 2022-04-14 NOTE — PROGRESS NOTES
CLINICAL PHARMACY NOTE: MEDS TO BEDS    Total # of Prescriptions Filled: 2   The following medications were delivered to the patient:  · Loperamide 2 mg  · Eliquis 5 mg    Additional Documentation:    Delivered to Patient wife=signed  Ok to deliver Nimisha 3:10pm  Nata Valenzuela CPhT

## 2022-04-14 NOTE — PROGRESS NOTES
Pt incont of loose stool, pericare provided, barrier cream applied to excoriation on rectum, clean diaper applied. Pt assisted to chair with eat breakfast. Heparin cont to gtt per orders. Chair alarm in place.

## 2022-04-14 NOTE — PROGRESS NOTES
[facility]   Progress Note      4/14/2022 9:03 AM        Name: Nazia Kebede . Admitted: 4/12/2022    SUBJECTIVE:  He is doing OK, has right hip pain, his appetite is not great, he is upset about possible SNF placement for rehab. Reviewed interval ancillary notes    Current Medications  loperamide (IMODIUM) capsule 2 mg, 4x Daily PRN  atenolol (TENORMIN) tablet 50 mg, Daily  lisinopril (PRINIVIL;ZESTRIL) tablet 5 mg, Daily  atorvastatin (LIPITOR) tablet 10 mg, Nightly  sodium chloride flush 0.9 % injection 5-40 mL, 2 times per day  sodium chloride flush 0.9 % injection 5-40 mL, PRN  0.9 % sodium chloride infusion, PRN  ondansetron (ZOFRAN-ODT) disintegrating tablet 4 mg, Q8H PRN   Or  ondansetron (ZOFRAN) injection 4 mg, Q6H PRN  polyethylene glycol (GLYCOLAX) packet 17 g, Daily PRN  acetaminophen (TYLENOL) tablet 650 mg, Q6H PRN   Or  acetaminophen (TYLENOL) suppository 650 mg, Q6H PRN  heparin (porcine) injection 4,540 Units, PRN  heparin (porcine) injection 2,270 Units, PRN  heparin 25,000 units in dextrose 5% 250 mL (premix) infusion, Continuous        Objective:  /87   Pulse 75   Temp 97.8 °F (36.6 °C) (Oral)   Resp 18   Ht 5' 10\" (1.778 m)   Wt 124 lb 4.8 oz (56.4 kg)   SpO2 96%   BMI 17.84 kg/m²     Intake/Output Summary (Last 24 hours) at 4/14/2022 0903  Last data filed at 4/14/2022 0824  Gross per 24 hour   Intake 240 ml   Output 475 ml   Net -235 ml      Wt Readings from Last 3 Encounters:   04/14/22 124 lb 4.8 oz (56.4 kg)   02/15/22 121 lb 4.1 oz (55 kg)   02/02/22 156 lb (70.8 kg)       General appearance:  Appears comfortable  Eyes: Sclera clear. Pupils equal.  ENT: Moist oral mucosa. Trachea midline, no adenopathy. Cardiovascular: Regular rhythm, normal S1, S2. No murmur. No edema in lower extremities  Respiratory: Not using accessory muscles. Good inspiratory effort. Clear to auscultation bilaterally, no wheeze or crackles.    GI: Abdomen soft, no tenderness, not distended  Musculoskeletal: No cyanosis in digits, neck supple  Neurology: CN 2-12 grossly intact. No speech or motor deficits  Psych: Normal affect. Alert and oriented in time, place and person  Skin: Warm, dry, normal turgor    Labs and Tests:  CBC:   Recent Labs     04/12/22  1644 04/12/22  2109 04/13/22  0429 04/13/22  0819 04/13/22  1413 04/13/22  2131 04/14/22  0308   WBC 6.5  --  5.9  --   --   --  7.5   HGB 12.3*   < > 11.7*   < > 12.1* 12.0* 12.8*     --  250  --   --   --  297    < > = values in this interval not displayed. BMP:    Recent Labs     04/12/22  1250 04/13/22  0429 04/14/22  0308    140 138   K 4.0 4.0 3.7    105 104   CO2 22 21 21   BUN 26* 23* 20   CREATININE 0.9 0.8 1.0   GLUCOSE 126* 100* 120*     Hepatic:   Recent Labs     04/12/22  1250   AST 42*   ALT 21   BILITOT 0.4   ALKPHOS 100       ASSESSMENT AND PLAN    Principal Problem:    DVT femoral (deep venous thrombosis) with thrombophlebitis, bilateral (HCC)  Active Problems:    Severe malnutrition (HCC)  Resolved Problems:    * No resolved hospital problems. *      1. 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 - Last treatment of trastuzumab and pembrolizumab only was given on 3/29/2022.   - He was due for FOLFOX treatment on 4/5/2022, but it was held due to neutropenia.   - Hold chemotherapy while inpatient     2. Acute DVT of right lower extremity  - Venous doppler showed totally occluding deep vein thrombosis involving the right peroneal vein and right popliteal vein  - Hx of atrial fibrillation, but has not been on anticoagulation d/t previous GI bleed  - on Heparin gtt  - plan to switch to DOAC     3. Right hip pain  - MRI hip on 3/31/2022 revealed metastatic lesion with no pathological fracture. - He has been referred to oncology orthopedic surgery.  If no surgical intervention is recommended, may consider palliative radiation to hip. Serge Diallo, [facility]  [facility]          Agree with the above note. Known patient of stage 4 GE junction adenocarcinoma on palliative FOLFOX + Trastuzumab + Pembrolizumab. Admitted for diarrhea and DVT. At discharge switch to [address] for anticoagulation of DVT. In regards to his watery diarrhea, he is outside the window of chemotherapy induced diarrhea as its been over 2 weeks since his last infusion, he also did not have any problems with diarrhea cycle 1 & 2. C. Diff testing pending, if C. Diff negative recommend consult to GI for evlauation of other causes of diarrhea. Plan to give him FOLFOX inpatient to prevent delays with his chemotherapy as he is already off cycle by 2 weeks. FOLFOX will be given without bolus and 5-FU and Oxaliplatin will be dose reduced by 20%. Orders have been faxed to pharmacy.      Vicky Beebe MD  [facility]

## 2022-04-14 NOTE — CARE COORDINATION
Therapy recommending SNF placement. Patient is on chemotherapy , facilities not able to accept because of the fact that he is on chemotherapy. Spouse is concerned ,stating that patient is too weak she cannot take care of him safely at home even  with [facility] services. Patient and spouse asked if it is possible for the oncologist to put chemo on hold for a few weeks for patient to go to SNF for rehab. Message was sent to the oncologist - CNP  who stated that Dr Jamila Clark  has concerns about delaying treatment, he will come to discuss with patient and spouse. Will continue to follow. Rosy Fagan

## 2022-04-14 NOTE — DISCHARGE SUMMARY
mouth 4 times daily as needed for Diarrhea  Qty: 10 capsule, Refills: 0       !! - Potential duplicate medications found. Please discuss with provider. Current Discharge Medication List        Current Discharge Medication List      CONTINUE these medications which have NOT CHANGED    Details   ferrous sulfate (IRON 325) 325 (65 Fe) MG tablet Take 325 mg by mouth daily (with breakfast)      Cholecalciferol (VITAMIN D3) 50 MCG (2000 UT) CAPS Take by mouth      vitamin C (ASCORBIC ACID) 500 MG tablet Take 500 mg by mouth daily      Cholecalciferol (VITAMIN D3) 125 MCG (5000 UT) TABS Take by mouth      pantoprazole (PROTONIX) 40 MG tablet Take 1 tablet by mouth daily  Qty: 30 tablet, Refills: 0      loratadine (CLARITIN) 10 MG tablet Take 10 mg by mouth as needed      ondansetron (ZOFRAN) 4 MG tablet Take 1 tablet by mouth every 4 hours as needed for Nausea or Vomiting  Qty: 15 tablet, Refills: 0    Associated Diagnoses: Nausea      acetaminophen (TYLENOL) 325 MG tablet Take 2 tablets by mouth every 4 hours as needed for Pain or Fever  Qty: 120 tablet, Refills: 3      atenolol (TENORMIN) 25 MG tablet Take 50 mg by mouth daily       ALPHAGAN P 0.1 % SOLN Place 1 drop into the left eye 3 times daily       dorzolamide (TRUSOPT) 2 % ophthalmic solution Place 1 drop into the right eye 3 times daily       lisinopril (PRINIVIL;ZESTRIL) 5 MG tablet Take 5 mg by mouth daily       simvastatin (ZOCOR) 40 MG tablet Take 40 mg by mouth nightly       zolpidem (AMBIEN) 5 MG tablet 2.5 mg.            Current Discharge Medication List          Labs:  For convenience and continuity at follow-up the following most recent labs are provided:    Lab Results   Component Value Date    WBC 7.5 04/14/2022    HGB 12.9 04/14/2022    HCT 39.8 04/14/2022    MCV 92.4 04/14/2022     04/14/2022     04/14/2022    K 3.7 04/14/2022     04/14/2022    CO2 21 04/14/2022    BUN 20 04/14/2022    CREATININE 1.0 04/14/2022    CALCIUM 8.3 04/14/2022    PHOS 2.9 10/29/2019    ALKPHOS 100 04/12/2022    ALT 21 04/12/2022    AST 42 04/12/2022    BILITOT 0.4 04/12/2022    BILIDIR 0.3 02/01/2022    LABALBU 2.5 04/12/2022    LDLCALC 25 10/01/2020    TRIG 94 10/01/2020     Lab Results   Component Value Date    INR 1.15 (H) 02/15/2022    INR 1.16 (H) 02/01/2022    INR 1.13 01/15/2018       Radiology:  CT Head WO Contrast    Result Date: 4/12/2022  EXAMINATION: CT OF THE HEAD WITHOUT CONTRAST  4/12/2022 12:28 pm TECHNIQUE: CT of the head was performed without the administration of intravenous contrast. Dose modulation, iterative reconstruction, and/or weight based adjustment of the mA/kV was utilized to reduce the radiation dose to as low as reasonably achievable. COMPARISON: None. HISTORY: ORDERING SYSTEM PROVIDED HISTORY: fall TECHNOLOGIST PROVIDED HISTORY: Reason for exam:->fall Has a \"code stroke\" or \"stroke alert\" been called? ->No Decision Support Exception - unselect if not a suspected or confirmed emergency medical condition->Emergency Medical Condition (MA) Reason for Exam: fall FINDINGS: BRAIN/VENTRICLES: There is no acute intracranial hemorrhage, mass effect or midline shift. No abnormal extra-axial fluid collection. The gray-white differentiation is maintained without evidence of an acute infarct. There is no evidence of hydrocephalus. Moderate senescent changes with parenchymal volume loss and extensive chronic small vessel ischemic changes. 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 SINUSES: The visualized paranasal sinuses and mastoid air cells demonstrate no acute abnormality. SOFT TISSUES/SKULL:  No acute abnormality of the visualized skull or soft tissues. No acute intracranial abnormality. Moderate senescent changes with parenchymal volume loss and chronic small vessel ischemic changes.      MRI HIP RIGHT W WO CONTRAST    Result Date: with areas of pulmonary airspace   consolidation on the current exam.         XR CHEST PORTABLE   Final Result   Worsening multifocal bilateral pneumonia when compared with the prior study   of 08/04/2021. XR CHEST (2 VW)    (Results Pending)       Assessment:  1. 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 Plan:  1. Decrease dose of steroids  2. Repeat radiograph  3. Antibiotics per infectious disease    Time at the bedside, reviewing labs and radiographs, reviewing updated notes and consultations, discussing with staff and family was more than 35 minutes. Please note that voice recognition technology was used in the preparation of this note and make therefore it may contain inadvertent transcription errors. If the patient is a COVID 19 isolation patient, the above physical exam reflects that of the examining physician for the day. Nikki Tan MD,  M.D., F.C.C.P.     [facility] [address], [address] [place] [address] 4/1/2022  EXAMINATION: MRI OF THE RIGHT HIP WITH AND WITHOUT CONTRAST, 3/31/2022 1:13 pm TECHNIQUE: Multiplanar multisequence MRI of the right hip was performed with and without the administration of intravenous contrast. COMPARISON: Radiographs dated 03/02/2022, CT abdomen and pelvis dated 02/01/2022 HISTORY: ORDERING SYSTEM PROVIDED HISTORY: Gastroesophageal cancer ([address] TECHNOLOGIST PROVIDED HISTORY: STAT Creatinine as needed:->No Reason for exam:->Pain What is the sedation requirement?->None Reason for Exam: Gastroesophageal cancer FINDINGS: BONE MARROW: There is a T1 marrow replacing lesion within the right acetabulum with corresponding T2 hyperintensity and enhancement. This measures 4.8 x 2.2 x 5.2 cm. A pathologic fracture is not identified at this time. No additional focal lesion is appreciated. Metallic susceptibility artifact limits the examination, likely related to the stimulator device. HIP JOINT: The degree of femoral head coverage by the bony acetabulum is within normal limits. The acetabular lesion is as detailed above. There is diffuse cartilage thinning. Small marginal osteophytes are noted. There is focal convexity at the femoral head-neck junction. LABRUM: A discrete labral tear is not identified. BURSAE: There is no greater trochanteric or iliopsoas bursitis. SCIATIC NERVE: The sciatic nerve itself is of normal course and caliber. There are prominent veins along the posterior aspect of the sciatic nerve demonstrating mild mass effect upon the nerve. MUSCLES / TENDONS: The hamstring origin is intact. The ischiofemoral space is within normal limits. The gluteus medius and minimus tendon insertions are intact. There is mild edema within the gluteus minimus muscle. A lipoma is incidentally noted at the muscular origins from the anterior superior iliac spine. INTRAPELVIC CONTENTS / SOFT TISSUES: Diverticulosis is noted. The prostate gland is enlarged with calcifications.      Large (4.8 x 2.2 x 5.2 cm) marrow replacing lesion within the right acetabulum, most consistent with metastatic disease. No pathologic fracture at this time. MRI HIP LEFT W WO CONTRAST    Result Date: 4/1/2022  EXAMINATION: MRI OF THE LEFT HIP WITH AND WITHOUT CONTRAST, 3/31/2022 1:12 pm TECHNIQUE: Multiplanar multisequence MRI of the left hip was performed with and without the administration of intravenous contrast. COMPARISON: Radiographs dated 03/02/2022, CT dated 02/01/2022 HISTORY: ORDERING SYSTEM PROVIDED HISTORY: Gastroesophageal cancer [facility]) TECHNOLOGIST PROVIDED HISTORY: STAT Creatinine as needed:->No Reason for exam:->Pain What is the sedation requirement?->None Reason for Exam: Gastroesophageal cancer FINDINGS: BONE MARROW: A right acetabular lesion is described on the right hip MRI. No additional focal marrow replacing lesions are identified. HIP JOINT: The degree of left femoral head coverage by the bony acetabulum is within normal limits. There is mild diffuse left hip cartilage thinning. The amount of joint fluid is within normal limits. LABRUM: A discrete acetabular labral tear is not identified. BURSAE: There is no greater trochanteric or iliopsoas bursitis. SCIATIC NERVE:The sciatic nerve is of normal course and caliber. Prominent venous structures extend along the posterior margin of the sciatic nerve. MUSCLES / TENDONS: The hamstring origin is intact. The ischiofemoral space is within normal limits. The gluteus medius and minimus tendon insertions are intact. There is mild edema in the short adductor musculature. There is mild edema involving the gluteus minimus muscle. INTRAPELVIC CONTENTS / SOFT TISSUES: Diverticulosis and prostate enlargement and calcifications are noted. 1. No metastatic disease at the left hip. Mild left hip degenerative change. 2. Right acetabular lesion, most consistent with metastatic disease, as described on the right hip MRI report.      VL Extremity Venous Right    Result Date: 4/12/2022  Vascular Lower Extremities DVT Study Procedure -- PRELIMINARY SONOGRAPHER REPORT --   Demographics   Patient Name      Carmella Hernandez   Date of Study     04/12/2022          Gender              Male   Patient Number    [id number]          Date of Birth       1940   Visit Number      [id number]           Age                 78 year(s)   Accession Number  [id number]          Room Number         0006   Corporate ID      [id number]            Jocelyn Corona, Chinyere Chen                                                            RVT, RDMS, AB,                                                            OB/GYN   Ordering          Henrique,        Interpreting        Kelley Terrazas MD  Physician         Mercy Brunner, KRISTAN      Physician  Procedure Type of Study:   Veins:Lower Extremities DVT Study, VL EXTREMITY VENOUS DUPLEX RIGHT. Tech Comments Right Acute partially occluding deep vein thrombosis involving the right posterior tibial vein zone 6. Acute totally occluding deep vein thrombosis involving the right peroneal vein zone 6. Indeterminate age totally occluding deep vein thrombosis involving the right popliteal vein. No other evidence of deep vein or superficial vein thrombosis involving the right lower extremity and the left common femoral vein. Spoke with Mari CHAKRABORTY in ER. XR HIP 2-3 VW W PELVIS RIGHT    Result Date: 4/12/2022  EXAMINATION: ONE XRAY VIEW OF THE PELVIS AND TWO XRAY VIEWS RIGHT HIP 4/12/2022 12:11 pm COMPARISON: 03/31/2022 HISTORY: ORDERING SYSTEM PROVIDED HISTORY: fall, rt hip pain TECHNOLOGIST PROVIDED HISTORY: Reason for exam:->fall, rt hip pain Reason for Exam: Fall (pt. fell at home and cx: of Right Hip pain) FINDINGS: There is no acute fracture or dislocation. The bones are normally mineralized. The previously noted right acetabular lesion is not visualized on the current study. There is mild bilateral hip osteoarthritis.   Vascular calcifications are noted. 1. No acute abnormality.          Signed:

## 2022-04-14 NOTE — DISCHARGE INSTR - COC
Cataract H26.9    Allergic rhinitis J30.9    Essential hypertension I10    Esophageal reflux K21.9    Other and unspecified hyperlipidemia E78.5    Insomnia G47.00    Glaucoma H40.9    Anxiety state F41.1    SVT (supraventricular tachycardia) ([facility]) I47.1    Coronary artery disease involving native coronary artery of native heart without angina pectoris I25.10    Ascending cholangitis K83.09    Acute pancreatitis K85.90    Acute gallstone pancreatitis K85.10    Cholecystitis K81.9    Abnormal LFTs R94.5    Acute GI bleeding K92.2    DVT femoral (deep venous thrombosis) with thrombophlebitis, bilateral (HCC) I82.413    Severe malnutrition ([facility]) E43       Isolation/Infection:   Isolation            C Diff Contact          Patient Infection Status       Infection Onset Added Last Indicated Last Indicated By Review Planned Expiration Resolved Resolved By    C-diff Rule Out 04/14/22 04/14/22 04/14/22 Clostridium difficile toxin/antigen (Ordered)                Nurse Assessment:  Last Vital Signs: /87   Pulse 75   Temp 97.8 °F (36.6 °C) (Oral)   Resp 18   Ht 5' 10\" (1.778 m)   Wt 124 lb 4.8 oz (56.4 kg)   SpO2 96%   BMI 17.84 kg/m²     Last documented pain score (0-10 scale): Pain Level: 0  Last Weight:   Wt Readings from Last 1 Encounters:   04/14/22 124 lb 4.8 oz (56.4 kg)     Mental Status:  oriented    IV Access:  - Port site [facility] Mobility/ADLs:  Walking   Assisted  Transfer  Assisted  Bathing  Assisted  Dressing  Assisted  Toileting  Assisted  Feeding  Independent  Med Admin  Assisted  Med Delivery   whole    Wound Care Documentation and Therapy:        Elimination:  Continence:    Bowel: Yes  Bladder: No  Urinary Catheter: None   Colostomy/Ileostomy/Ileal Conduit: No       Date of Last BM: 4/18/22    Intake/Output Summary (Last 24 hours) at 4/14/2022 1223  Last data filed at 4/14/2022 0824  Gross per 24 hour   Intake 240 ml   Output 425 ml   Net -185 ml     I/O last 3 completed shifts: In: 240 [P.O.:240]  Out: 400 [Urine:400]    Safety Concerns:     None    Impairments/Disabilities:      Hearing    Nutrition Therapy:  Current Nutrition Therapy:   - Oral Diet:  General    Routes of Feeding: Oral  Liquids: Thin Liquids  Daily Fluid Restriction: no  Last Modified Barium Swallow with Video (Video Swallowing Test): not done    Treatments at the Time of Hospital Discharge:   Respiratory Treatments: none  Oxygen Therapy:  is not on home oxygen therapy.   Ventilator:    - No ventilator support    Rehab Therapies: SN,PT,OT,HHA,MSW  Weight Bearing Status/Restrictions: No weight bearing restrictions  Other Medical Equipment (for information only, NOT a DME order):  cane  Other Treatments: HOME HEALTH CARE: LEVEL 3 SAFETY       -Initial home health evaluation to occur within 24-48 hours, in patient home   -Home health agency to establish plan of care for patient over 60 day period   -Medication Reconciliation   -PT/OT/Speech evaluations in home within 24-48 hours of discharge; including  -DME and home safety   -Frontload therapy 5 days, then 3x a week   -OT to evaluate if patient has [address] needs for personal care   - evaluation within 24-48 hours, includes evaluation of resources   and insurance to determine [place], [place], LTC, and Medicaid options   -PCP Visit scheduled within three to seven days of discharge   -Telehealth-Homecare Vitals(If patient is agreeable and meets guidelines)       Patient's personal belongings (please select all that are sent with patient):  Glasses, Hearing Aides bilateral    RN SIGNATURE:  Electronically signed by Morales Bryan RN on 4/18/22 at 11:32 AM EDT    CASE MANAGEMENT/SOCIAL WORK SECTION    Inpatient Status Date: 4/12/2022    Readmission Risk Assessment Score:  Readmission Risk              Risk of Unplanned Readmission:  16           Discharging to Facility/ Agency   Name: Ashley Ayala will call for Appointment  Phone:

## 2022-04-14 NOTE — PROGRESS NOTES
4/13/22 PM:  Patient with Heparin gtt. Rectal excoriation for multiple loose stools and wiping so hard, orange cream at BS. BLE 3+ pitting edema. H/H q6h with last one at 0345 being 12.8/38.8. Therapy recommends SNF at [place].

## 2022-04-15 LAB
ANION GAP SERPL CALCULATED.3IONS-SCNC: 10 MMOL/L (ref 3–16)
BASOPHILS ABSOLUTE: 0 K/UL (ref 0–0.2)
BASOPHILS RELATIVE PERCENT: 0.3 %
BUN BLDV-MCNC: 17 MG/DL (ref 7–20)
CALCIUM SERPL-MCNC: 8 MG/DL (ref 8.3–10.6)
CHLORIDE BLD-SCNC: 105 MMOL/L (ref 99–110)
CO2: 22 MMOL/L (ref 21–32)
CREAT SERPL-MCNC: 0.9 MG/DL (ref 0.8–1.3)
EOSINOPHILS ABSOLUTE: 0 K/UL (ref 0–0.6)
EOSINOPHILS RELATIVE PERCENT: 0.3 %
GFR AFRICAN AMERICAN: >60
GFR NON-AFRICAN AMERICAN: >60
GI BACTERIAL PATHOGENS BY PCR: NORMAL
GLUCOSE BLD-MCNC: 91 MG/DL (ref 70–99)
HCT VFR BLD CALC: 37.9 % (ref 40.5–52.5)
HEMOGLOBIN: 12.5 G/DL (ref 13.5–17.5)
LYMPHOCYTES ABSOLUTE: 1.3 K/UL (ref 1–5.1)
LYMPHOCYTES RELATIVE PERCENT: 25.5 %
MCH RBC QN AUTO: 30.9 PG (ref 26–34)
MCHC RBC AUTO-ENTMCNC: 32.8 G/DL (ref 31–36)
MCV RBC AUTO: 94.2 FL (ref 80–100)
MONOCYTES ABSOLUTE: 0.6 K/UL (ref 0–1.3)
MONOCYTES RELATIVE PERCENT: 12.1 %
NEUTROPHILS ABSOLUTE: 3.3 K/UL (ref 1.7–7.7)
NEUTROPHILS RELATIVE PERCENT: 61.8 %
PDW BLD-RTO: 17.9 % (ref 12.4–15.4)
PLATELET # BLD: 229 K/UL (ref 135–450)
PMV BLD AUTO: 6.8 FL (ref 5–10.5)
POTASSIUM REFLEX MAGNESIUM: 3.9 MMOL/L (ref 3.5–5.1)
RBC # BLD: 4.03 M/UL (ref 4.2–5.9)
SODIUM BLD-SCNC: 137 MMOL/L (ref 136–145)
WBC # BLD: 5.3 K/UL (ref 4–11)

## 2022-04-15 PROCEDURE — 2500000003 HC RX 250 WO HCPCS: Performed by: STUDENT IN AN ORGANIZED HEALTH CARE EDUCATION/TRAINING PROGRAM

## 2022-04-15 PROCEDURE — 6360000002 HC RX W HCPCS: Performed by: STUDENT IN AN ORGANIZED HEALTH CARE EDUCATION/TRAINING PROGRAM

## 2022-04-15 PROCEDURE — 85025 COMPLETE CBC W/AUTO DIFF WBC: CPT

## 2022-04-15 PROCEDURE — 2580000003 HC RX 258: Performed by: STUDENT IN AN ORGANIZED HEALTH CARE EDUCATION/TRAINING PROGRAM

## 2022-04-15 PROCEDURE — 6370000000 HC RX 637 (ALT 250 FOR IP): Performed by: INTERNAL MEDICINE

## 2022-04-15 PROCEDURE — 1200000000 HC SEMI PRIVATE

## 2022-04-15 PROCEDURE — 80048 BASIC METABOLIC PNL TOTAL CA: CPT

## 2022-04-15 PROCEDURE — 36415 COLL VENOUS BLD VENIPUNCTURE: CPT

## 2022-04-15 PROCEDURE — A4216 STERILE WATER/SALINE, 10 ML: HCPCS | Performed by: STUDENT IN AN ORGANIZED HEALTH CARE EDUCATION/TRAINING PROGRAM

## 2022-04-15 PROCEDURE — 6360000002 HC RX W HCPCS: Performed by: INTERNAL MEDICINE

## 2022-04-15 PROCEDURE — 2580000003 HC RX 258: Performed by: INTERNAL MEDICINE

## 2022-04-15 PROCEDURE — 6370000000 HC RX 637 (ALT 250 FOR IP): Performed by: STUDENT IN AN ORGANIZED HEALTH CARE EDUCATION/TRAINING PROGRAM

## 2022-04-15 RX ORDER — DEXAMETHASONE SODIUM PHOSPHATE 10 MG/ML
10 INJECTION, SOLUTION INTRAMUSCULAR; INTRAVENOUS ONCE
Status: COMPLETED | OUTPATIENT
Start: 2022-04-15 | End: 2022-04-15

## 2022-04-15 RX ORDER — DEXAMETHASONE SODIUM PHOSPHATE 4 MG/ML
10 INJECTION, SOLUTION INTRA-ARTICULAR; INTRALESIONAL; INTRAMUSCULAR; INTRAVENOUS; SOFT TISSUE ONCE
Status: DISCONTINUED | OUTPATIENT
Start: 2022-04-15 | End: 2022-04-15 | Stop reason: SDUPTHER

## 2022-04-15 RX ORDER — ACETAMINOPHEN 325 MG/1
325 TABLET ORAL ONCE
Status: COMPLETED | OUTPATIENT
Start: 2022-04-15 | End: 2022-04-15

## 2022-04-15 RX ORDER — ONDANSETRON 4 MG/1
8 TABLET, ORALLY DISINTEGRATING ORAL ONCE
Status: COMPLETED | OUTPATIENT
Start: 2022-04-15 | End: 2022-04-15

## 2022-04-15 RX ORDER — SODIUM CHLORIDE 9 MG/ML
INJECTION, SOLUTION INTRAVENOUS CONTINUOUS
Status: ACTIVE | OUTPATIENT
Start: 2022-04-15 | End: 2022-04-17

## 2022-04-15 RX ADMIN — FLUOROURACIL 1650 MG: 50 INJECTION, SOLUTION INTRAVENOUS at 18:11

## 2022-04-15 RX ADMIN — ONDANSETRON 8 MG: 4 TABLET, ORALLY DISINTEGRATING ORAL at 15:04

## 2022-04-15 RX ADMIN — OXALIPLATIN 116 MG: 5 INJECTION, SOLUTION, CONCENTRATE INTRAVENOUS at 15:40

## 2022-04-15 RX ADMIN — LOPERAMIDE HYDROCHLORIDE 2 MG: 2 CAPSULE ORAL at 00:00

## 2022-04-15 RX ADMIN — APIXABAN 10 MG: 5 TABLET, FILM COATED ORAL at 08:53

## 2022-04-15 RX ADMIN — ATORVASTATIN CALCIUM 10 MG: 10 TABLET, FILM COATED ORAL at 20:38

## 2022-04-15 RX ADMIN — DEXAMETHASONE SODIUM PHOSPHATE 10 MG: 10 INJECTION, SOLUTION INTRAMUSCULAR; INTRAVENOUS at 15:04

## 2022-04-15 RX ADMIN — ALTEPLASE 1 MG: 2.2 INJECTION, POWDER, LYOPHILIZED, FOR SOLUTION INTRAVENOUS at 14:22

## 2022-04-15 RX ADMIN — ACETAMINOPHEN 325 MG: 325 TABLET ORAL at 14:31

## 2022-04-15 RX ADMIN — OXYCODONE 10 MG: 5 TABLET ORAL at 14:31

## 2022-04-15 RX ADMIN — LOPERAMIDE HYDROCHLORIDE 2 MG: 2 CAPSULE ORAL at 10:49

## 2022-04-15 RX ADMIN — LOPERAMIDE HYDROCHLORIDE 2 MG: 2 CAPSULE ORAL at 20:38

## 2022-04-15 RX ADMIN — LOPERAMIDE HYDROCHLORIDE 2 MG: 2 CAPSULE ORAL at 05:41

## 2022-04-15 RX ADMIN — LEUCOVORIN CALCIUM 684 MG: 350 INJECTION, POWDER, LYOPHILIZED, FOR SOLUTION INTRAMUSCULAR; INTRAVENOUS at 15:48

## 2022-04-15 RX ADMIN — SODIUM CHLORIDE, PRESERVATIVE FREE 10 ML: 5 INJECTION INTRAVENOUS at 08:53

## 2022-04-15 RX ADMIN — APIXABAN 10 MG: 5 TABLET, FILM COATED ORAL at 20:38

## 2022-04-15 RX ADMIN — SODIUM CHLORIDE, PRESERVATIVE FREE 10 ML: 5 INJECTION INTRAVENOUS at 20:38

## 2022-04-15 RX ADMIN — SODIUM CHLORIDE: 9 INJECTION, SOLUTION INTRAVENOUS at 11:01

## 2022-04-15 RX ADMIN — GLYCERIN, PETROLATUM, PHENYLEPHRINE HCL, PRAMOXINE HCL 1 EACH: 144; 2.5; 10; 15 CREAM TOPICAL at 20:53

## 2022-04-15 RX ADMIN — ATENOLOL 50 MG: 50 TABLET ORAL at 08:53

## 2022-04-15 RX ADMIN — FAMOTIDINE 20 MG: 10 INJECTION, SOLUTION INTRAVENOUS at 15:05

## 2022-04-15 RX ADMIN — LISINOPRIL 5 MG: 5 TABLET ORAL at 08:53

## 2022-04-15 ASSESSMENT — PAIN SCALES - GENERAL
PAINLEVEL_OUTOF10: 0
PAINLEVEL_OUTOF10: 2
PAINLEVEL_OUTOF10: 0

## 2022-04-15 NOTE — PROGRESS NOTES
Original chemotherapy orders reviewed and acknowledged. 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 Patient educated on chemotherapy regimen. Acknowledgement of informed consent for chemotherapy verified. Pt height, weight, and BSA verified. Appropriate dosing calculations of chemotherapy based on height, weight, and BSA verified. Administration: Chemotherapy drug Fluorouracil independently verified with Roland Reyes RN prior to administration. Original order, appropriateness of regimen, drug supplied, height, weight, BSA, dose calculations, expiration dates/times, drug appearance, and two patient identifiers were verified by both RNs. Drug checked for vesicant/irritant status and for risk of hypersensitivity. Most recent laboratory values and allergies, were reviewed. Appropriate IV access available: Positive, brisk blood return via CVC was confirmed prior to administration. Chest x-ray for correct line placement reviewed  Dominik Candelario RN and Roland Reyes RN verified correct rate of chemotherapy and maintenance IV fluids. Patient was educated on chemotherapy regimen prior to administration including indication for treatment related to disease & side effects of chemotherapy drug. Patient verbalizes understanding of all instructions.

## 2022-04-15 NOTE — CARE COORDINATION
SNF  Referrals sent to [facility], [facility] AT [facility] as per patient's and spouse's choices.

## 2022-04-15 NOTE — PROGRESS NOTES
[facility]    4/15/2022 10:32 AM        Name: Bethany Ibarra . Admitted: 4/12/2022  Primary Care Provider: Edilia Mosley MD (Tel: [phone])        Subjective: In bed still having some diarrhea which improved no nausea vomiting or chest    Reviewed interval ancillary notes    Current Medications  0.9 % sodium chloride infusion, Continuous  apixaban (ELIQUIS) tablet 10 mg, BID   Followed by  Ulysses Pacer ON 4/21/2022] apixaban (ELIQUIS) tablet 5 mg, BID  pramox-PE-glycerin-petrolatum cream, BID  oxyCODONE (ROXICODONE) immediate release tablet 10 mg, Q6H PRN  loperamide (IMODIUM) capsule 2 mg, 4x Daily PRN  atenolol (TENORMIN) tablet 50 mg, Daily  lisinopril (PRINIVIL;ZESTRIL) tablet 5 mg, Daily  atorvastatin (LIPITOR) tablet 10 mg, Nightly  sodium chloride flush 0.9 % injection 5-40 mL, 2 times per day  sodium chloride flush 0.9 % injection 5-40 mL, PRN  0.9 % sodium chloride infusion, PRN  ondansetron (ZOFRAN-ODT) disintegrating tablet 4 mg, Q8H PRN   Or  ondansetron (ZOFRAN) injection 4 mg, Q6H PRN  polyethylene glycol (GLYCOLAX) packet 17 g, Daily PRN  acetaminophen (TYLENOL) tablet 650 mg, Q6H PRN   Or  acetaminophen (TYLENOL) suppository 650 mg, Q6H PRN        Objective:  BP (!) 145/87   Pulse 82   Temp 98.2 °F (36.8 °C) (Oral)   Resp 16   Ht 5' 10\" (1.778 m)   Wt 124 lb 11.2 oz (56.6 kg)   SpO2 95%   BMI 17.89 kg/m²     Intake/Output Summary (Last 24 hours) at 4/15/2022 1032  Last data filed at 4/15/2022 0828  Gross per 24 hour   Intake --   Output 550 ml   Net -550 ml      Wt Readings from Last 3 Encounters:   04/15/22 124 lb 11.2 oz (56.6 kg)   02/15/22 121 lb 4.1 oz (55 kg)   02/02/22 156 lb (70.8 kg)     General appearance:  Appears comfortable.  AAOx3  HEENT: atraumatic, Pupils equal, muscous membranes moist, no masses appreciated  Cardiovascular: Regular rate and rhythm no murmurs appreciated  Respiratory: CTAB no wheezing  Gastrointestinal: Abdomen soft, non-tender, BS+  EXT: RLE>LLE swelling  Neurology: no gross focal deficts  Psychiatry: Appropriate affect. Not agitated  Skin: Warm, dry, no rashes appreciated    Labs and Tests:  CBC:   Recent Labs     04/13/22  0429 04/13/22  0819 04/14/22  0308 04/14/22  0919 04/15/22  0609   WBC 5.9  --  7.5  --  5.3   HGB 11.7*   < > 12.8* 12.9* 12.5*     --  297  --  229    < > = values in this interval not displayed. BMP:    Recent Labs     04/13/22  0429 04/14/22  0308 04/15/22  0609    138 137   K 4.0 3.7 3.9    104 105   CO2 21 21 22   BUN 23* 20 17   CREATININE 0.8 1.0 0.9   GLUCOSE 100* 120* 91     Hepatic:   Recent Labs     04/12/22  1250   AST 42*   ALT 21   BILITOT 0.4   ALKPHOS 100     VL Extremity Venous Right         CT Head WO Contrast   Final Result   No acute intracranial abnormality. Moderate senescent changes with parenchymal volume loss and chronic small   vessel ischemic changes. XR HIP 2-3 VW W PELVIS RIGHT   Final Result   1. No acute abnormality. Recent imaging reviewed    Problem List  Principal Problem:    DVT femoral (deep venous thrombosis) with thrombophlebitis, bilateral (HCC)  Active Problems:    Severe malnutrition (HCC)  Resolved Problems:    * No resolved hospital problems.  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

## 2022-04-15 NOTE — PLAN OF CARE
Problem: Falls - Risk of:  Goal: Will remain free from falls  Description: Will remain free from falls  4/15/2022 1118 by Judi Blackman RN  Outcome: Ongoing  Note: D:  Patient continues to be a fall risk d/t weakness  A:  Bed alarm on, reminded to call before ambulation  R:  Patient calls before ambulation, declined to get oob this am, Will continue to assess fall risk status and report changes.     Pt A&OX4, BP (!) 145/87   Pulse 82   Temp 98.2 °F (36.8 °C) (Oral)   Resp 16   Ht 5' 10\" (1.778 m)   Wt 124 lb 11.2 oz (56.6 kg)   SpO2 95%   BMI 17.89 kg/m²   No c/o pain, has had a few soft stools this am, imodium given per pt request, cdiff isolation discontinued, wife at bedside, asked to talk to [email or web address] Delroy Silveira [facility] notified, report called to [employer]

## 2022-04-15 NOTE — PLAN OF CARE
Nutrition Problem #1: Severe malnutrition  Intervention: Food and/or Nutrient Delivery: Continue Current Diet,Modify Oral Nutrition Supplement  Nutritional Goals: PO intake 50% or greater of meals and supplement

## 2022-04-15 NOTE — PROGRESS NOTES
Occupational Therapy  Sandro Ren    Unable to see patient at this time due to having lunch. Wife stated will also be leaving for 5T to start chemotherapy (chart also stated the same). Will attempt later as schedule allows and when patient is able to participate. Thank you,  Sandoval Do.  [address]

## 2022-04-15 NOTE — PROGRESS NOTES
[facility]   Progress Note      4/15/2022 9:55 AM        Name: Ania Ruiz . Admitted: 4/12/2022    SUBJECTIVE:  He is doing OK, offers no new complaints, plans for chemotherapy today. Reviewed interval ancillary notes    Current Medications  0.9 % sodium chloride infusion, Continuous  apixaban (ELIQUIS) tablet 10 mg, BID   Followed by  Bronwyn Berumen ON 4/21/2022] apixaban (ELIQUIS) tablet 5 mg, BID  pramox-PE-glycerin-petrolatum cream, BID  oxyCODONE (ROXICODONE) immediate release tablet 10 mg, Q6H PRN  loperamide (IMODIUM) capsule 2 mg, 4x Daily PRN  atenolol (TENORMIN) tablet 50 mg, Daily  lisinopril (PRINIVIL;ZESTRIL) tablet 5 mg, Daily  atorvastatin (LIPITOR) tablet 10 mg, Nightly  sodium chloride flush 0.9 % injection 5-40 mL, 2 times per day  sodium chloride flush 0.9 % injection 5-40 mL, PRN  0.9 % sodium chloride infusion, PRN  ondansetron (ZOFRAN-ODT) disintegrating tablet 4 mg, Q8H PRN   Or  ondansetron (ZOFRAN) injection 4 mg, Q6H PRN  polyethylene glycol (GLYCOLAX) packet 17 g, Daily PRN  acetaminophen (TYLENOL) tablet 650 mg, Q6H PRN   Or  acetaminophen (TYLENOL) suppository 650 mg, Q6H PRN        Objective:  BP (!) 145/87   Pulse 82   Temp 98.2 °F (36.8 °C) (Oral)   Resp 16   Ht 5' 10\" (1.778 m)   Wt 124 lb 11.2 oz (56.6 kg)   SpO2 95%   BMI 17.89 kg/m²     Intake/Output Summary (Last 24 hours) at 4/15/2022 0955  Last data filed at 4/15/2022 0828  Gross per 24 hour   Intake --   Output 550 ml   Net -550 ml      Wt Readings from Last 3 Encounters:   04/15/22 124 lb 11.2 oz (56.6 kg)   02/15/22 121 lb 4.1 oz (55 kg)   02/02/22 156 lb (70.8 kg)       General appearance:  Appears comfortable  Eyes: Sclera clear. Pupils equal.  ENT: Moist oral mucosa. Trachea midline, no adenopathy. Cardiovascular: Regular rhythm, normal S1, S2. No murmur. No edema in lower extremities  Respiratory: Not using accessory muscles. Good inspiratory effort.  Clear to auscultation bilaterally, no wheeze or crackles. GI: Abdomen soft, no tenderness, not distended  Musculoskeletal: No cyanosis in digits, neck supple  Neurology: CN 2-12 grossly intact. No speech or motor deficits  Psych: Normal affect. Alert and oriented in time, place and person  Skin: Warm, dry, normal turgor    Labs and Tests:  CBC:   Recent Labs     04/13/22 0429 04/13/22  0819 04/14/22  0308 04/14/22  0919 04/15/22  0609   WBC 5.9  --  7.5  --  5.3   HGB 11.7*   < > 12.8* 12.9* 12.5*     --  297  --  229    < > = values in this interval not displayed. BMP:    Recent Labs     04/13/22 0429 04/14/22  0308 04/15/22  0609    138 137   K 4.0 3.7 3.9    104 105   CO2 21 21 22   BUN 23* 20 17   CREATININE 0.8 1.0 0.9   GLUCOSE 100* 120* 91     Hepatic:   Recent Labs     04/12/22  1250   AST 42*   ALT 21   BILITOT 0.4   ALKPHOS 100       ASSESSMENT AND PLAN    Principal Problem:    DVT femoral (deep venous thrombosis) with thrombophlebitis, bilateral (HCC)  Active Problems:    Severe malnutrition (HCC)  Resolved Problems:    * No resolved hospital problems. *      1. 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 - Last treatment of trastuzumab and pembrolizumab only was given on 3/29/2022.   - He was due for FOLFOX treatment on 4/5/2022, but it was held due to neutropenia. - patient will receive FOLFOX today in order to prevent further treatment delay. - chemotherapy orders reviewed with pharmacist, reduced doses will be given d/t previous drug induced neutropenia. - patient to be transferred to [address] [place] [address]    2.  Acute DVT of right lower extremity  - Venous doppler showed totally occluding deep vein thrombosis involving the right peroneal vein and right popliteal vein  - Hx of atrial fibrillation, but has not been on anticoagulation d/t previous GI bleed  - on Heparin gtt  - plan to switch to DOAC on discharge. 3. Right hip pain  - MRI hip on 3/31/2022 revealed metastatic lesion with no pathological fracture. - He has been referred to oncology orthopedic surgery. If no surgical intervention is recommended, may consider palliative radiation to hip. 4. Diarrhea  - c diff negative 4/14/22  - GI consult pending      Kal Gaviria, [facility]  [facility]    Patient was seen and examined. Agree with above. Patient has been planned to start on chemotherapy  FOLFOX with dose modifications. He will be started on Eliquis.     Sabino Lozada MD

## 2022-04-15 NOTE — PROGRESS NOTES
Nutrition Note    RECOMMENDATIONS  1. PO Diet: continue regular diet  2. ONS: modify Ensure Enlive BID to Ensure Compact BID      NUTRITION ASSESSMENT   Pt with compromised nutrition status AEB PO intake remains inadequate. Continue to reports- no being hungry and nothing tastes good. He admits to not drinking the Ensure Marcelinochaya, agreed to try the Ensure Compact. Encouraged po intake of meals &  supplement. Will continue to follow nutrition status.  Nutrition Related Findings: +3 pitting BLE edema; plans for chemo to begin today (4/15)   Wounds: None   Nutrition Education:  Education completed    Nutrition Goals: PO intake 50% or greater of meals and supplement     MALNUTRITION ASSESSMENT   Context of Malnutrition: Chronic Illness   Malnutrition Status: Severe malnutrition  Findings of the 6 clinical characteristics of malnutrition:  Energy Intake:  7 - 75% or less estimated energy requirements for 1 month or longer  Weight Loss:  7 - Greater than 20% over 1 year     Body Fat Loss:  1 - Mild body fat loss Triceps   Muscle Mass Loss:  1 - Mild muscle mass loss Clavicles (pectoralis & deltoids),Temples (temporalis),Hand (interosseous)  Fluid Accumulation:  7 - Severe Extremities (+3 pitting BLE)   Strength:  Not Performed      NUTRITION DIAGNOSIS   · Severe malnutrition related to inadequate protein-energy intake,catabolic illness as evidenced by poor intake prior to admission,weight loss greater than or equal to 20% in 1 year,mild loss of subcutaneous fat,mild muscle loss      CURRENT NUTRITION THERAPIES  ADULT DIET;  Regular  ADULT ORAL NUTRITION SUPPLEMENT; Lunch, Dinner; Standard 4 oz Oral Supplement     PO Intake: 1-25%,26-50%   PO Supplement Intake:0%,1-25%    ANTHROPOMETRICS   Current Height: 5' 10\" (177.8 cm)   Current Weight: 124 lb 11.2 oz (56.6 kg)     Ideal Body Weight (IBW): 166 lbs  (75 kg)        BMI: 17.8      COMPARATIVE STANDARDS  Energy (kcal):  1710 - 1995     Protein (g):  86 - 103       Fluid (ml/day):  1710 - 9897    The patient will be monitored per nutrition standards of care. Consult dietitian if additional nutrition interventions are needed prior to RD reassessment.      Tracy Harrison RD, LD    Contact: [phone]

## 2022-04-15 NOTE — CONSULTS
Gastroenterology Consult Note        Patient: Álvaro Dorantes  : 1940  Acct#:      Date:  4/15/2022    Subjective:       History of Present Illness  Patient is a 78 y.o.  male admitted with General weakness [R53.1]  Hip pain, right [M25.551]  Fall in home, initial encounter [W19. XXXA, I51.478]  DVT femoral (deep venous thrombosis) with thrombophlebitis, bilateral (HCC) [I82.413]  Acute deep vein thrombosis (DVT) of tibial vein of right lower extremity (HCC) [I82.441]  Acute deep vein thrombosis (DVT) of right peroneal vein (HCC) [I82.451]  Malignant neoplasm of esophagus, unspecified location [facility]) [C15.9] who is seen in consult for diarrhea. History of hypertension, GERD, CAD. Patient was seen in 2019 for gallstone pancreatitis. Underwent ERCP and cholecystectomy as below. Patient was diagnosed with stage IV metastatic adenocarcinoma of the distal esophagus/GE junction in 2022. Had EGD and colonoscopy then as below. He was started on FOLFOX plus trastuzumab and Keytruda (started 6 weeks ago). Patient was brought to the ER on  after multiple falls at home. He was diagnosed with DVT. Also had reported diarrhea. Patient's wife states the diarrhea has been going on for about 3 weeks. Occurs several times a day. No bloody diarrhea. Patient reports he is still having diarrhea here but thinks it is perhaps improved. He has been getting Imodium a couple times a day. No abdominal pain, nausea, vomiting.     Past Medical History:   Diagnosis Date    Allergic rhinitis 2009    Anxiety state 2007    Overview:  ICD-10 Transition    CAD (coronary artery disease)     Cataract 2007    Overview:  ICD-10 Transition    Chronic ischemic heart disease 2008    Esophageal reflux 2007    Essential hypertension 2007    Overview:  ICD-10 Transition    Glaucoma     left eye    Influenza 2017    Insomnia 2008    Other and unspecified hyperlipidemia 9/24/2008      Past Surgical History:   Procedure Laterality Date    CHOLECYSTECTOMY, LAPAROSCOPIC  01/17/2018    LAPAROSCOPIC CHOLECYSTECTOMY WITH CHOLANGIOGRAM    COLONOSCOPY      total of 3, first one polyps, 2nd and 3rd no polyps    COLONOSCOPY N/A 2/2/2022    COLONOSCOPY POLYPECTOMY SNARE/COLD BIOPSY performed by Emily Reyes MD at [facility]      Dr Abisai Zamarripa    ERCP  01/16/2018    EYE SURGERY      bilateral cataracts removed, \"floaters\"    HERNIA REPAIR      bilateral inguinal hernia repairs    IR PORT PLACEMENT EQUAL OR GREATER THAN 5 YEARS  2/15/2022    IR PORT PLACEMENT EQUAL OR GREATER THAN 5 YEARS 2/15/2022 Faiza Hickman MD [facility] SPECIAL PROCEDURES    UPPER GASTROINTESTINAL ENDOSCOPY N/A 2/2/2022    EGD BIOPSY performed by Emily Reyes MD at [facility]      Past Endoscopic History  EGD and colonoscopy with Dr Barbara Correa 2/2/22  INDICATION : Anemia and diffuse metastatic disease on ct scan  IMPRESSION : Mid esophagus from 22-28 cm noninflammed multiple  mass lesions biopsied jar #3. distal esophagus 28-40 cm inflammed/somewhat ulcerated  tumors extensively throughout this area biopsied; difficult  to tell exact GE junction due to tumor. No severe luminal  narrowing/ scope easily passes; biopsies placed jar #2. difficult to tell exact GE junction due to tumor  hiatal hernia from 40-44 cm from the incisors  On retroflex view of cardia tumor extends into proximal  cardia including 10 mm clean based nonbleeding ulcer;  biopsies of cardia tumor placed jar #1. IMPRESSION : Diverticulosis of large intestine - K57.30  Benign neoplasm of cecum - D12.0  Benign neoplasm of ascending colon - D12.2       FINAL DIAGNOSIS:        A. Stomach, fundus, mass, biopsy:      - Adenocarcinoma, at least intramucosal.        B.  Esophagus, distal, mass, biopsy:      - Adenocarcinoma, focally invasive with mucinous feature.        (PRINIVIL;ZESTRIL) 5 MG tablet Take 5 mg by mouth daily       simvastatin (ZOCOR) 40 MG tablet Take 40 mg by mouth nightly       zolpidem (AMBIEN) 5 MG tablet 2.5 mg. Allergies  No Known Allergies   Social   Social History     Tobacco Use    Smoking status: Former Smoker     Packs/day: 1.00     Years: 35.00     Pack years: 35.00     Types: Cigarettes     Quit date: 1988     Years since quittin.6    Smokeless tobacco: Never Used   Substance Use Topics    Alcohol use: Yes     Comment: beer occasionally        Family History   Problem Relation Age of Onset   [facility] Cancer Mother     Dementia Mother     Stroke Father           Review of Systems  Constitutional: negative for fevers, chills, sweats    Ears, nose, mouth, throat, and face: negative for nasal congestion and sore throat   Respiratory: negative for cough and shortness of breath   Cardiovascular: negative for chest pain and dyspnea   Gastrointestinal: see hpi   Genitourinary:negative for dysuria and frequency   Integument/breast: negative for pruritus and rash   Hematologic/lymphatic: negative for bleeding and easy bruising   Musculoskeletal:negative for arthralgias and myalgias   Neurological: negative for dizziness and weakness         Physical Exam  Blood pressure (!) 145/87, pulse 82, temperature 98.2 °F (36.8 °C), temperature source Oral, resp. rate 16, height 5' 10\" (1.778 m), weight 124 lb 11.2 oz (56.6 kg), SpO2 95 %. General appearance: alert, cooperative, no distress, appears stated age  Eyes: Anicteric  Head: Normocephalic, without obvious abnormality  Lungs: clear to auscultation bilaterally, Normal Effort  Heart: regular rate and rhythm, normal S1 and S2, no murmurs or rubs  Abdomen: soft, non-tender. Bowel sounds normal. No masses,  no organomegaly.    Extremities: atraumatic, no cyanosis or edema  Skin: warm and dry, no jaundice  Neuro: Grossly intact, A&OX3  Musculoskeletal:   strength BUE      Data Review: Recent Labs     04/13/22  0429 04/13/22  0819 04/14/22  0308 04/14/22  0919 04/15/22  0609   WBC 5.9  --  7.5  --  5.3   HGB 11.7*   < > 12.8* 12.9* 12.5*   HCT 36.5*   < > 38.8* 39.8* 37.9*   MCV 94.5  --  92.4  --  94.2     --  297  --  229    < > = values in this interval not displayed. Recent Labs     04/13/22  0429 04/14/22  0308 04/15/22  0609    138 137   K 4.0 3.7 3.9    104 105   CO2 21 21 22   BUN 23* 20 17   CREATININE 0.8 1.0 0.9     Recent Labs     04/12/22  1250   AST 42*   ALT 21   BILITOT 0.4   ALKPHOS 100     No results for input(s): LIPASE, AMYLASE in the last 72 hours. No results for input(s): PROTIME, INR in the last 72 hours. No results for input(s): PTT in the last 72 hours. No results for input(s): OCCULTBLD in the last 72 hours. Imaging Studies:                            Assessment:     Principal Problem:    DVT femoral (deep venous thrombosis) with thrombophlebitis, bilateral (HCC)  Active Problems:    Severe malnutrition (HCC)  Resolved Problems:    * No resolved hospital problems. *    Diarrhea -began 3 weeks ago. Colonoscopy back in February was okay as above. Stool negative for C. difficile here. Other infectious stool studies pending. He is on Keytruda which could cause an immune mediated colitis. Seems as if his diarrhea started 3 weeks after his first dose of Keytruda.     Metastatic esophageal cancer    Acute DVT of RLE -on heparin drip    Recommendations:   -Follow-up stool GI bacterial pathogens and EIA for parasites  -Consider repeat colonoscopy if symptoms persist  -Will discuss with oncology    Is a very pleasant 78-year-old male who was seen at the bedside today with our certified physicians assistant  We are asked to see and evaluate this patient for a severe diarrhea  Patient has been having diarrhea and this started before he was on [place] for his esophageal carcinoma  Diarrhea has continued to bother him it even goes down his leg at time  So were asked to see and evaluate him for    He just had a colonoscopy in February  Have to consider repeating a colonoscopy if he continues to have the severe diarrhea  Although the diarrhea did predate the Keytruda  Could be that of a lymphocytic microcytic diarrhea  Have seen Keytruda caused diarrhea and some patient  Potentially has made worse    We will continue to monitor this patient and send some stool studies off at this time    Thank you for this kind referral  Discussed with Dr. Caron Munoz, [address] [place] [address]

## 2022-04-15 NOTE — PROGRESS NOTES
Original chemotherapy orders reviewed and acknowledged. Appropriateness of chemotherapy treatment regimen Oxaplatin and Leucovorin for diagnosis of metastatic adenocarcinoma of the esophagus was verified. Patient educated on chemotherapy regimen. Acknowledgement of informed consent for chemotherapy obtained. Pt height, weight, and BSA verified. Appropriate dosing calculations of chemotherapy based on height, weight, and BSA verified. Administration: Chemotherapy drug Oxaplatin and Leucovorin independently verified with Ariela Baker RN prior to administration. Original order, appropriateness of regimen, drug supplied, height, weight, BSA, dose calculations, expiration dates/times, drug appearance, and two patient identifiers were verified by both RNs. Drug checked for vesicant/irritant status and for risk of hypersensitivity. Most recent laboratory values and allergies, were reviewed. Appropriate IV access available: Positive, brisk blood return via CVC was confirmed prior to administration. Chest x-ray for correct line placement reviewed  Marisol Dash RN and Ariela Baker RN verified correct rate of chemotherapy and maintenance IV fluids. Patient was educated on chemotherapy regimen prior to administration including indication for treatment related to disease & side effects of chemotherapy drug. Patient verbalizes understanding of all instructions.

## 2022-04-15 NOTE — PROGRESS NOTES
Port accessed. Pt tolerated well. Flush performed with no success. Order for Cathflo and instilled, will check in 30 minutes. 1500: pull back and wasted 20ml of blood with cathflo. Flush performed and will begin pre-medication for chemotherapy. Pt and wife educated and consent obtained and in chart.

## 2022-04-15 NOTE — PROGRESS NOTES
4/14/22 PM:  Patient still having loose BM's. Rectal excoriation improved with Preparation H BID. Plans are to move to [address] [place] [address] today for a 48 hour chemo infusion. Plans are to dc to rehab on Monday, 4/18. Stool sent on day shift to r/o C.diff and for GI pathogens.

## 2022-04-15 NOTE — CARE COORDINATION
Patient provided with the list of [facility] approved facilities to choose  at least 3 facilities of preference of where he would like to go  after his chemo treatment. Will continue to follow.

## 2022-04-15 NOTE — PROGRESS NOTES
Received message from hospitalist:   4/15/22 7:40 PM   Is she trying to wean herself off? Received message from hospitalist:   4/15/22 7:41 PM   When was last dose? Sent message to hospitalist:  4/15/22 7:41 PM   No just wean herself down. Read 7:41 PM     Sent message to hospitalist:  4/15/22 7:41 PM   let me find out. Read 7:41 PM     Sent message to hospitalist:  4/15/22 7:45 PM   she took it Wednesday. but these are capsules so it can't be split  Read 7:48 PM     Received message from hospitalist:   4/15/22 7:49 PM   We can do Fetzima 80 mg oral every other day. With a dose today     Received message from hospitalist:   4/15/22 7:50 PM   As long as patient is ok with that    Sent message to hospitalist:  4/15/22 7:50 PM   okay do you mind ordering it that way  Read 7:50 PM     Sent message to hospitalist:   4/15/22 7:51 PM   She said whatever was fine, that she needs the med. She is following up with her primary care may 5.   Read 7:51 PM     Sent message to hospitalist:  4/15/22 7:51 PM   Her psychiatrist left the practice she was going to  Read 7:51 PM

## 2022-04-16 LAB
ANION GAP SERPL CALCULATED.3IONS-SCNC: 7 MMOL/L (ref 3–16)
BASOPHILS ABSOLUTE: 0 K/UL (ref 0–0.2)
BASOPHILS RELATIVE PERCENT: 0.4 %
BUN BLDV-MCNC: 19 MG/DL (ref 7–20)
CALCIUM SERPL-MCNC: 7.8 MG/DL (ref 8.3–10.6)
CHLORIDE BLD-SCNC: 108 MMOL/L (ref 99–110)
CO2: 21 MMOL/L (ref 21–32)
CREAT SERPL-MCNC: 0.8 MG/DL (ref 0.8–1.3)
EOSINOPHILS ABSOLUTE: 0 K/UL (ref 0–0.6)
EOSINOPHILS RELATIVE PERCENT: 0 %
GFR AFRICAN AMERICAN: >60
GFR NON-AFRICAN AMERICAN: >60
GLUCOSE BLD-MCNC: 147 MG/DL (ref 70–99)
HCT VFR BLD CALC: 33.6 % (ref 40.5–52.5)
HEMOGLOBIN: 11 G/DL (ref 13.5–17.5)
LYMPHOCYTES ABSOLUTE: 0.8 K/UL (ref 1–5.1)
LYMPHOCYTES RELATIVE PERCENT: 13.8 %
MCH RBC QN AUTO: 30.4 PG (ref 26–34)
MCHC RBC AUTO-ENTMCNC: 32.8 G/DL (ref 31–36)
MCV RBC AUTO: 92.6 FL (ref 80–100)
MONOCYTES ABSOLUTE: 0.3 K/UL (ref 0–1.3)
MONOCYTES RELATIVE PERCENT: 5.1 %
NEUTROPHILS ABSOLUTE: 4.5 K/UL (ref 1.7–7.7)
NEUTROPHILS RELATIVE PERCENT: 80.7 %
PDW BLD-RTO: 18.2 % (ref 12.4–15.4)
PLATELET # BLD: 213 K/UL (ref 135–450)
PMV BLD AUTO: 6.7 FL (ref 5–10.5)
POTASSIUM REFLEX MAGNESIUM: 4.2 MMOL/L (ref 3.5–5.1)
RBC # BLD: 3.62 M/UL (ref 4.2–5.9)
SODIUM BLD-SCNC: 136 MMOL/L (ref 136–145)
WBC # BLD: 5.5 K/UL (ref 4–11)

## 2022-04-16 PROCEDURE — 2580000003 HC RX 258: Performed by: INTERNAL MEDICINE

## 2022-04-16 PROCEDURE — 6370000000 HC RX 637 (ALT 250 FOR IP): Performed by: INTERNAL MEDICINE

## 2022-04-16 PROCEDURE — 36591 DRAW BLOOD OFF VENOUS DEVICE: CPT

## 2022-04-16 PROCEDURE — 80048 BASIC METABOLIC PNL TOTAL CA: CPT

## 2022-04-16 PROCEDURE — 1200000000 HC SEMI PRIVATE

## 2022-04-16 PROCEDURE — 97116 GAIT TRAINING THERAPY: CPT

## 2022-04-16 PROCEDURE — 6360000002 HC RX W HCPCS: Performed by: STUDENT IN AN ORGANIZED HEALTH CARE EDUCATION/TRAINING PROGRAM

## 2022-04-16 PROCEDURE — 97530 THERAPEUTIC ACTIVITIES: CPT

## 2022-04-16 PROCEDURE — 6370000000 HC RX 637 (ALT 250 FOR IP): Performed by: STUDENT IN AN ORGANIZED HEALTH CARE EDUCATION/TRAINING PROGRAM

## 2022-04-16 PROCEDURE — 85025 COMPLETE CBC W/AUTO DIFF WBC: CPT

## 2022-04-16 PROCEDURE — 2580000003 HC RX 258: Performed by: STUDENT IN AN ORGANIZED HEALTH CARE EDUCATION/TRAINING PROGRAM

## 2022-04-16 RX ADMIN — GLYCERIN, PETROLATUM, PHENYLEPHRINE HCL, PRAMOXINE HCL 1 EACH: 144; 2.5; 10; 15 CREAM TOPICAL at 09:29

## 2022-04-16 RX ADMIN — OXYCODONE 10 MG: 5 TABLET ORAL at 17:01

## 2022-04-16 RX ADMIN — APIXABAN 10 MG: 5 TABLET, FILM COATED ORAL at 09:29

## 2022-04-16 RX ADMIN — SODIUM CHLORIDE, PRESERVATIVE FREE 10 ML: 5 INJECTION INTRAVENOUS at 09:28

## 2022-04-16 RX ADMIN — ATORVASTATIN CALCIUM 10 MG: 10 TABLET, FILM COATED ORAL at 19:51

## 2022-04-16 RX ADMIN — SODIUM CHLORIDE, PRESERVATIVE FREE 10 ML: 5 INJECTION INTRAVENOUS at 19:52

## 2022-04-16 RX ADMIN — LOPERAMIDE HYDROCHLORIDE 2 MG: 2 CAPSULE ORAL at 17:01

## 2022-04-16 RX ADMIN — ATENOLOL 50 MG: 50 TABLET ORAL at 09:29

## 2022-04-16 RX ADMIN — SODIUM CHLORIDE, PRESERVATIVE FREE 10 ML: 5 INJECTION INTRAVENOUS at 13:55

## 2022-04-16 RX ADMIN — SODIUM CHLORIDE, PRESERVATIVE FREE 10 ML: 5 INJECTION INTRAVENOUS at 18:47

## 2022-04-16 RX ADMIN — SODIUM CHLORIDE: 9 INJECTION, SOLUTION INTRAVENOUS at 13:55

## 2022-04-16 RX ADMIN — APIXABAN 10 MG: 5 TABLET, FILM COATED ORAL at 19:51

## 2022-04-16 RX ADMIN — SODIUM CHLORIDE, PRESERVATIVE FREE 10 ML: 5 INJECTION INTRAVENOUS at 05:48

## 2022-04-16 RX ADMIN — SODIUM CHLORIDE, PRESERVATIVE FREE 10 ML: 5 INJECTION INTRAVENOUS at 00:21

## 2022-04-16 RX ADMIN — LISINOPRIL 5 MG: 5 TABLET ORAL at 09:28

## 2022-04-16 RX ADMIN — SODIUM CHLORIDE: 9 INJECTION, SOLUTION INTRAVENOUS at 00:23

## 2022-04-16 RX ADMIN — FLUOROURACIL 1650 MG: 50 INJECTION, SOLUTION INTRAVENOUS at 17:55

## 2022-04-16 RX ADMIN — GLYCERIN, PETROLATUM, PHENYLEPHRINE HCL, PRAMOXINE HCL 1 EACH: 144; 2.5; 10; 15 CREAM TOPICAL at 19:57

## 2022-04-16 ASSESSMENT — PAIN SCALES - GENERAL
PAINLEVEL_OUTOF10: 0
PAINLEVEL_OUTOF10: 7
PAINLEVEL_OUTOF10: 0

## 2022-04-16 NOTE — PROGRESS NOTES
[facility]cology [facility]   Progress Note      4/16/2022 1:25 PM        Name: Nazia Kebede . Admitted: 4/12/2022    SUBJECTIVE:      Tolerating chemotherapy well. No mouth sores  No diarrhea      Reviewed interval ancillary notes    Current Medications  fluorouracil (ADRUCIL) 1,650 mg in sodium chloride 0.9 % 1000 mL chemo infusion, Continuous   Followed by  fluorouracil (ADRUCIL) 1,650 mg in sodium chloride 0.9 % 1000 mL chemo infusion, Continuous  0.9 % sodium chloride infusion, Continuous  apixaban (ELIQUIS) tablet 10 mg, BID   Followed by  Finis Pacer ON 4/21/2022] apixaban (ELIQUIS) tablet 5 mg, BID  pramox-PE-glycerin-petrolatum cream, BID  oxyCODONE (ROXICODONE) immediate release tablet 10 mg, Q6H PRN  loperamide (IMODIUM) capsule 2 mg, 4x Daily PRN  atenolol (TENORMIN) tablet 50 mg, Daily  lisinopril (PRINIVIL;ZESTRIL) tablet 5 mg, Daily  atorvastatin (LIPITOR) tablet 10 mg, Nightly  sodium chloride flush 0.9 % injection 5-40 mL, 2 times per day  sodium chloride flush 0.9 % injection 5-40 mL, PRN  0.9 % sodium chloride infusion, PRN  ondansetron (ZOFRAN-ODT) disintegrating tablet 4 mg, Q8H PRN   Or  ondansetron (ZOFRAN) injection 4 mg, Q6H PRN  polyethylene glycol (GLYCOLAX) packet 17 g, Daily PRN  acetaminophen (TYLENOL) tablet 650 mg, Q6H PRN   Or  acetaminophen (TYLENOL) suppository 650 mg, Q6H PRN        Objective:  /67   Pulse 74   Temp 97.4 °F (36.3 °C) (Oral)   Resp 16   Ht 5' 10\" (1.778 m)   Wt 124 lb 11.2 oz (56.6 kg)   SpO2 97%   BMI 17.89 kg/m²     Intake/Output Summary (Last 24 hours) at 4/16/2022 1325  Last data filed at 4/16/2022 0930  Gross per 24 hour   Intake 120 ml   Output 725 ml   Net -605 ml      Wt Readings from Last 3 Encounters:   04/15/22 124 lb 11.2 oz (56.6 kg)   02/15/22 121 lb 4.1 oz (55 kg)   02/02/22 156 lb (70.8 kg)       Conscious alert oriented. No pallor or icterus  Respiratory efforts are normal.  Abdomen is not distended.   Extremities no edema  Neuro no focal deficits noted    Labs and Tests:  CBC:   Recent Labs     04/14/22  0308 04/14/22  0308 04/14/22  0919 04/15/22  0609 04/16/22  0549   WBC 7.5  --   --  5.3 5.5   HGB 12.8*   < > 12.9* 12.5* 11.0*     --   --  229 213    < > = values in this interval not displayed. BMP:    Recent Labs     04/14/22  0308 04/15/22  0609 04/16/22  0549    137 136   K 3.7 3.9 4.2    105 108   CO2 21 22 21   BUN 20 17 19   CREATININE 1.0 0.9 0.8   GLUCOSE 120* 91 147*     Hepatic:   No results for input(s): AST, ALT, ALB, BILITOT, ALKPHOS in the last 72 hours. ASSESSMENT AND PLAN    Principal Problem:    DVT femoral (deep venous thrombosis) with thrombophlebitis, bilateral (HCC)  Active Problems:    Severe malnutrition (HCC)  Resolved Problems:    * No resolved hospital problems. *      1. 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 - Last treatment of trastuzumab and pembrolizumab only was given on 3/29/2022.   - He was due for FOLFOX treatment on 4/5/2022, but it was held due to neutropenia. - patient will receive FOLFOX today in order to prevent further treatment delay. - chemotherapy orders reviewed with pharmacist, reduced doses will be given d/t previous drug induced neutropenia. - patient to be transferred to [address] [place] [address]    2. Acute DVT of right lower extremity  - Venous doppler showed totally occluding deep vein thrombosis involving the right peroneal vein and right popliteal vein  - Hx of atrial fibrillation, but has not been on anticoagulation d/t previous GI bleed  -On Eliquis     3. Right hip pain  - MRI hip on 3/31/2022 revealed metastatic lesion with no pathological fracture. - He has been referred to oncology orthopedic surgery. If no surgical intervention is recommended, may consider palliative radiation to hip.     4. Diarrhea  - c diff negative 4/14/22  - GI on board        Leonides Kim MD

## 2022-04-16 NOTE — PROGRESS NOTES
PIV flushed, brisk blood return noted. Patient does not exhibit any signs or symptoms of an adverse reaction to the chemotherapy.

## 2022-04-16 NOTE — CARE COORDINATION
Discharge Planning: The SW was informed a referral to [facility] was not received. The SW received resent a referral to [facility] [place] [facility]: [phone]. The SW called to follow up on referral and the admission coordinator stated they would look into the patient. The patient has [address] and pre-cert will need to be started.      Electronically signed by EDUIN Hurley on 4/16/2022 at 10:44 AM

## 2022-04-16 NOTE — PROGRESS NOTES
Pt states he had several loose BMs while working with PT. Pt up to brm at this time with this RN. Smear of BM noted. Pt requesting imodium. Medication administered. Pt also c/o 7/10 pain to rectum and requesting oxycodone. Medication administered. Pt denies further needs at this time.  Electronically signed by Gilberto Pillai RN on 4/16/2022 at 5:05 PM

## 2022-04-16 NOTE — PROGRESS NOTES
Gastroenterology Progress Note      Sandro [facility]os Home is a 78 y.o. male patient. Principal Problem:    DVT femoral (deep venous thrombosis) with thrombophlebitis, bilateral (HCC)  Active Problems:    Severe malnutrition (HCC)  Resolved Problems:    * No resolved hospital problems. *      SUBJECTIVE: Still having diarrhea    ROS:  No fever, chills  No chest pain, palpitations  No SOB, cough  Gastrointestinal ROS: no abdominal pain, nausea, vomiting     Physical    VITALS:  /71   Pulse 84   Temp 97.1 °F (36.2 °C) (Oral)   Resp 16   Ht 5' 10\" (1.778 m)   Wt 124 lb 11.2 oz (56.6 kg)   SpO2 97%   BMI 17.89 kg/m²   TEMPERATURE:  Current - Temp: 97.1 °F (36.2 °C); Max - Temp  Av °F (36.7 °C)  Min: 97.1 °F (36.2 °C)  Max: 98.8 °F (37.1 °C)    NAD  RRR, Nl s1s2  Lungs CTA Bilaterally, normal effort  Abdomen soft, ND, NT, no HSM, Bowel sounds normal.    Data    Data Review:    Recent Labs     22  0919 04/15/22  0609 22  0549   WBC 7.5  --   --  5.3 5.5   HGB 12.8*   < > 12.9* 12.5* 11.0*   HCT 38.8*   < > 39.8* 37.9* 33.6*   MCV 92.4  --   --  94.2 92.6     --   --  229 213    < > = values in this interval not displayed. Recent Labs     04/14/22  0308 04/15/22  0609 22  0549    137 136   K 3.7 3.9 4.2    105 108   CO2 21 22 21   BUN 20 17 19   CREATININE 1.0 0.9 0.8     No results for input(s): AST, ALT, ALB, BILIDIR, BILITOT, ALKPHOS in the last 72 hours. No results for input(s): LIPASE, AMYLASE in the last 72 hours. No results for input(s): PROTIME, INR in the last 72 hours. No results for input(s): PTT in the last 72 hours.     Radiology Review:        ASSESSMENT diarrhea this has continued    PLAN at this time because the patient is continuing to have diarrhea we will assess him tomorrow but I do suspect Monday he will need a colonoscopy for evaluation    MD Josefina Barnhart

## 2022-04-16 NOTE — PROGRESS NOTES
[facility]    4/16/2022 12:07 PM        Name: Dick Choi .               Admitted: 4/12/2022  Primary Care Provider: Damion Abreu MD (Tel: [phone])        Subjective:    Current chemotherapy denies any chest pain shortness of breath fevers or chills but still continues to have diarrhea    Reviewed interval ancillary notes    Current Medications  fluorouracil (ADRUCIL) 1,650 mg in sodium chloride 0.9 % 1000 mL chemo infusion, Continuous   Followed by  fluorouracil (ADRUCIL) 1,650 mg in sodium chloride 0.9 % 1000 mL chemo infusion, Continuous  0.9 % sodium chloride infusion, Continuous  apixaban (ELIQUIS) tablet 10 mg, BID   Followed by  Soto Prieto ON 4/21/2022] apixaban (ELIQUIS) tablet 5 mg, BID  pramox-PE-glycerin-petrolatum cream, BID  oxyCODONE (ROXICODONE) immediate release tablet 10 mg, Q6H PRN  loperamide (IMODIUM) capsule 2 mg, 4x Daily PRN  atenolol (TENORMIN) tablet 50 mg, Daily  lisinopril (PRINIVIL;ZESTRIL) tablet 5 mg, Daily  atorvastatin (LIPITOR) tablet 10 mg, Nightly  sodium chloride flush 0.9 % injection 5-40 mL, 2 times per day  sodium chloride flush 0.9 % injection 5-40 mL, PRN  0.9 % sodium chloride infusion, PRN  ondansetron (ZOFRAN-ODT) disintegrating tablet 4 mg, Q8H PRN   Or  ondansetron (ZOFRAN) injection 4 mg, Q6H PRN  polyethylene glycol (GLYCOLAX) packet 17 g, Daily PRN  acetaminophen (TYLENOL) tablet 650 mg, Q6H PRN   Or  acetaminophen (TYLENOL) suppository 650 mg, Q6H PRN        Objective:  /67   Pulse 74   Temp 97.4 °F (36.3 °C) (Oral)   Resp 16   Ht 5' 10\" (1.778 m)   Wt 124 lb 11.2 oz (56.6 kg)   SpO2 97%   BMI 17.89 kg/m²     Intake/Output Summary (Last 24 hours) at 4/16/2022 1207  Last data filed at 4/16/2022 0930  Gross per 24 hour   Intake 120 ml   Output 725 ml   Net -605 ml      Wt Readings from Last 3 Encounters:   04/15/22 124 lb 11.2 oz (56.6 kg)   02/15/22 121 lb 4.1 oz (55 kg)   02/02/22 156 lb (70.8 kg)     General appearance:  Appears comfortable. AAOx3  HEENT: atraumatic, Pupils equal, muscous membranes moist, no masses appreciated  Cardiovascular: Regular rate and rhythm no murmurs appreciated  Respiratory: CTAB no wheezing  Gastrointestinal: Abdomen soft, non-tender, BS+  EXT: RLE>LLE swelling  Neurology: no gross focal deficts  Psychiatry: Appropriate affect. Not agitated  Skin: Warm, dry, no rashes appreciated    Labs and Tests:  CBC:   Recent Labs     04/14/22  0308 04/14/22  0308 04/14/22  0919 04/15/22  0609 04/16/22  0549   WBC 7.5  --   --  5.3 5.5   HGB 12.8*   < > 12.9* 12.5* 11.0*     --   --  229 213    < > = values in this interval not displayed. BMP:    Recent Labs     04/14/22  0308 04/15/22  0609 04/16/22  0549    137 136   K 3.7 3.9 4.2    105 108   CO2 21 22 21   BUN 20 17 19   CREATININE 1.0 0.9 0.8   GLUCOSE 120* 91 147*     Hepatic:   No results for input(s): AST, ALT, ALB, BILITOT, ALKPHOS in the last 72 hours. VL Extremity Venous Right         CT Head WO Contrast   Final Result   No acute intracranial abnormality. Moderate senescent changes with parenchymal volume loss and chronic small   vessel ischemic changes. XR HIP 2-3 VW W PELVIS RIGHT   Final Result   1. No acute abnormality. Recent imaging reviewed    Problem List  Principal Problem:    DVT femoral (deep venous thrombosis) with thrombophlebitis, bilateral (HCC)  Active Problems:    Severe malnutrition (HCC)  Resolved Problems:    * No resolved hospital problems.  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

## 2022-04-16 NOTE — PROGRESS NOTES
Physical Therapy  Facility/Department: [address] ONCOLOGY  Daily Treatment Note  NAME: Enrique Mariee  : 1940  MRN: [id number]    Date of Service: 2022    Discharge Recommendations:  Enrique Mariee scored a 17/24 on the AM-PAC short mobility form. Current research shows that an AM-PAC score of 17 or less is typically not associated with a discharge to the patient's home setting. Based on the patient's AM-PAC score and their current functional mobility deficits, it is recommended that the patient have 5-7 sessions per week of Physical Therapy at d/c to increase the patient's independence. At this time, this patient demonstrates the endurance, and/or tolerance for 3 hours of therapy each day, with a treatment frequency of 5-7x/wk. Please see assessment section for further patient specific details. If patient discharges prior to next session this note will serve as a discharge summary. Please see below for the latest assessment towards goals. 24 hour supervision or assist,5-7 sessions per week   PT Equipment Recommendations  Equipment Needed: No  Other: Defer to next level of care. Assessment   Body structures, Functions, Activity limitations: Decreased functional mobility ; Decreased posture;Decreased endurance;Decreased ROM; Decreased strength;Decreased balance;Decreased safe awareness;Decreased vision/visual deficit  Assessment: Patient reports being functionally independent up until 3 weeks ago when he began starting to have falls and began using a cane for balance. Patient states even with cane he feels off balance. When provided rolling walker, patient demonstrates improved balance and safety. Patient is not at his functional baseline and he is very concerned about returning home in his present physical condition, not only for his own safety, but for the safety of his spouse who would have to help him.   Therefore, recommend 24 hour assist and continued therapy at d/c in an attempt to balance and safety with use of rolling walker. Balance  Sitting - Static: Good  Sitting - Dynamic: Good  Standing - Static: Fair  Standing - Dynamic: Fair         Strength RLE  Strength RLE: WFL  Strength LLE  Strength LLE: WFL           AM-PAC Score  AM-PAC Inpatient Mobility Raw Score : 17 (04/16/22 1646)  AM-PAC Inpatient T-Scale Score : 42.13 (04/16/22 1646)  Mobility Inpatient CMS 0-100% Score: 50.57 (04/16/22 1646)  Mobility Inpatient CMS G-Code Modifier : CK (04/16/22 1646)          Goals  Short term goals  Time Frame for Short term goals: Upon discharge  Short term goal 1: Pt will transfer supine to/from sit with supervision  (Not met)  Short term goal 2: Pt will transfer sit to/from stand with supervision  (Not met)  Short term goal 3: Pt will amb 76' with RW and supervision  (Not met)  Patient Goals   Patient goals : Pt did not state    Plan    Plan  Times per week: 3-5  Times per day: Daily  Current Treatment Recommendations: Gloria Alston Re-education,Patient/Caregiver Education & Training,ROM,Balance Training,Gait Training,Safety Education & Training,Stair training,Functional Mobility Training  Safety Devices  Type of devices:  All fall risk precautions in place,Call light within reach,Gait belt,Nurse notified,Chair alarm in place,Left in chair  Restraints  Initially in place: No     Therapy Time   Individual Concurrent Group Co-treatment   Time In 1530         Time Out 1645         Minutes 75         Timed Code Treatment Minutes: [address] [place], [place], DPT, ATC-R [id number]

## 2022-04-16 NOTE — PROGRESS NOTES
Head to toe assessment complete. Vital signs obtained. Pt resting in bed at this time. AM medication administered. Pt tolerated well. Pt denies pain. Brisk blood return noted to port. Line flushed with 10cc Normal Saline. Chemo infusing without difficulty. Full bed bath complete. Socks changed, gown changed, brief changed, tele leads and electrodes changed. Pericare complete and cream applied. 2 open areas noted. Pt repositioned in bed. Bed alarm set. Pt's wife at bedside. All questions answered and needs addressed.  Electronically signed by Kwabena Vásquez RN on 4/16/2022 at 10:01 AM

## 2022-04-17 LAB
ANION GAP SERPL CALCULATED.3IONS-SCNC: 7 MMOL/L (ref 3–16)
BASOPHILS ABSOLUTE: 0 K/UL (ref 0–0.2)
BASOPHILS RELATIVE PERCENT: 0.5 %
BUN BLDV-MCNC: 24 MG/DL (ref 7–20)
CALCIUM SERPL-MCNC: 7.6 MG/DL (ref 8.3–10.6)
CHLORIDE BLD-SCNC: 110 MMOL/L (ref 99–110)
CO2: 21 MMOL/L (ref 21–32)
CREAT SERPL-MCNC: 0.8 MG/DL (ref 0.8–1.3)
EOSINOPHILS ABSOLUTE: 0 K/UL (ref 0–0.6)
EOSINOPHILS RELATIVE PERCENT: 0.6 %
GFR AFRICAN AMERICAN: >60
GFR NON-AFRICAN AMERICAN: >60
GLUCOSE BLD-MCNC: 109 MG/DL (ref 70–99)
HCT VFR BLD CALC: 34.5 % (ref 40.5–52.5)
HEMOGLOBIN: 11.2 G/DL (ref 13.5–17.5)
LYMPHOCYTES ABSOLUTE: 0.8 K/UL (ref 1–5.1)
LYMPHOCYTES RELATIVE PERCENT: 16.1 %
MCH RBC QN AUTO: 30.6 PG (ref 26–34)
MCHC RBC AUTO-ENTMCNC: 32.4 G/DL (ref 31–36)
MCV RBC AUTO: 94.6 FL (ref 80–100)
MONOCYTES ABSOLUTE: 0.4 K/UL (ref 0–1.3)
MONOCYTES RELATIVE PERCENT: 7.4 %
NEUTROPHILS ABSOLUTE: 3.8 K/UL (ref 1.7–7.7)
NEUTROPHILS RELATIVE PERCENT: 75.4 %
PDW BLD-RTO: 17.7 % (ref 12.4–15.4)
PLATELET # BLD: 225 K/UL (ref 135–450)
PMV BLD AUTO: 6.9 FL (ref 5–10.5)
POTASSIUM REFLEX MAGNESIUM: 4.2 MMOL/L (ref 3.5–5.1)
RBC # BLD: 3.65 M/UL (ref 4.2–5.9)
SODIUM BLD-SCNC: 138 MMOL/L (ref 136–145)
WBC # BLD: 5 K/UL (ref 4–11)

## 2022-04-17 PROCEDURE — 80048 BASIC METABOLIC PNL TOTAL CA: CPT

## 2022-04-17 PROCEDURE — 6370000000 HC RX 637 (ALT 250 FOR IP): Performed by: STUDENT IN AN ORGANIZED HEALTH CARE EDUCATION/TRAINING PROGRAM

## 2022-04-17 PROCEDURE — 85025 COMPLETE CBC W/AUTO DIFF WBC: CPT

## 2022-04-17 PROCEDURE — 6370000000 HC RX 637 (ALT 250 FOR IP): Performed by: INTERNAL MEDICINE

## 2022-04-17 PROCEDURE — 2580000003 HC RX 258: Performed by: STUDENT IN AN ORGANIZED HEALTH CARE EDUCATION/TRAINING PROGRAM

## 2022-04-17 PROCEDURE — 1200000000 HC SEMI PRIVATE

## 2022-04-17 PROCEDURE — 2580000003 HC RX 258: Performed by: INTERNAL MEDICINE

## 2022-04-17 RX ADMIN — SODIUM CHLORIDE, PRESERVATIVE FREE 10 ML: 5 INJECTION INTRAVENOUS at 16:30

## 2022-04-17 RX ADMIN — OXYCODONE 10 MG: 5 TABLET ORAL at 20:55

## 2022-04-17 RX ADMIN — ATORVASTATIN CALCIUM 10 MG: 10 TABLET, FILM COATED ORAL at 20:51

## 2022-04-17 RX ADMIN — LISINOPRIL 5 MG: 5 TABLET ORAL at 08:38

## 2022-04-17 RX ADMIN — GLYCERIN, PETROLATUM, PHENYLEPHRINE HCL, PRAMOXINE HCL 1 EACH: 144; 2.5; 10; 15 CREAM TOPICAL at 08:39

## 2022-04-17 RX ADMIN — APIXABAN 10 MG: 5 TABLET, FILM COATED ORAL at 08:38

## 2022-04-17 RX ADMIN — SODIUM CHLORIDE, PRESERVATIVE FREE 10 ML: 5 INJECTION INTRAVENOUS at 21:00

## 2022-04-17 RX ADMIN — SODIUM CHLORIDE: 9 INJECTION, SOLUTION INTRAVENOUS at 05:28

## 2022-04-17 RX ADMIN — OXYCODONE 10 MG: 5 TABLET ORAL at 05:36

## 2022-04-17 RX ADMIN — ATENOLOL 50 MG: 50 TABLET ORAL at 08:38

## 2022-04-17 RX ADMIN — GLYCERIN, PETROLATUM, PHENYLEPHRINE HCL, PRAMOXINE HCL 1 EACH: 144; 2.5; 10; 15 CREAM TOPICAL at 20:56

## 2022-04-17 RX ADMIN — SODIUM CHLORIDE, PRESERVATIVE FREE 10 ML: 5 INJECTION INTRAVENOUS at 08:34

## 2022-04-17 RX ADMIN — APIXABAN 10 MG: 5 TABLET, FILM COATED ORAL at 20:51

## 2022-04-17 ASSESSMENT — PAIN DESCRIPTION - LOCATION
LOCATION: RECTUM
LOCATION: RECTUM

## 2022-04-17 ASSESSMENT — PAIN DESCRIPTION - FREQUENCY: FREQUENCY: CONTINUOUS

## 2022-04-17 ASSESSMENT — PAIN SCALES - GENERAL
PAINLEVEL_OUTOF10: 0
PAINLEVEL_OUTOF10: 0
PAINLEVEL_OUTOF10: 4
PAINLEVEL_OUTOF10: 0
PAINLEVEL_OUTOF10: 8
PAINLEVEL_OUTOF10: 0
PAINLEVEL_OUTOF10: 7
PAINLEVEL_OUTOF10: 5

## 2022-04-17 ASSESSMENT — PAIN DESCRIPTION - DESCRIPTORS: DESCRIPTORS: SORE

## 2022-04-17 ASSESSMENT — PAIN DESCRIPTION - PAIN TYPE
TYPE: ACUTE PAIN
TYPE: ACUTE PAIN

## 2022-04-17 NOTE — PROGRESS NOTES
Pt c/o 8/10 pain to rectum. Pt states it feels \"raw\". Pt had smear of BM. Pericare complete. Pt then had a large, formed BM. Pericare complete. Preparation H cream applied. Pt states pain \"greatly improved\" and feels \"a lot of relief\".   Electronically signed by Nicky Ruelas RN on 4/17/2022 at 11:53 AM

## 2022-04-17 NOTE — PROGRESS NOTES
Gastroenterology Progress Note      Sandro Calderon is a 78 y.o. male patient. Principal Problem:    DVT femoral (deep venous thrombosis) with thrombophlebitis, bilateral (HCC)  Active Problems:    Severe malnutrition (HCC)  Resolved Problems:    * No resolved hospital problems. *      SUBJECTIVE: Patient states today that the diarrhea has improved he has not had to wake up in the middle of the night    ROS:  No fever, chills  No chest pain, palpitations  No SOB, cough  Gastrointestinal ROS: no abdominal pain, nausea, vomiting     Physical    VITALS:  BP (!) 147/84   Pulse 72   Temp 97.7 °F (36.5 °C) (Oral)   Resp 16   Ht 5' 10\" (1.778 m)   Wt 124 lb 11.2 oz (56.6 kg)   SpO2 94%   BMI 17.89 kg/m²   TEMPERATURE:  Current - Temp: 97.7 °F (36.5 °C); Max - Temp  Av °F (36.7 °C)  Min: 97.4 °F (36.3 °C)  Max: 98.4 °F (36.9 °C)    NAD  RRR, Nl s1s2  Lungs CTA Bilaterally, normal effort  Abdomen soft, ND, NT, no HSM, Bowel sounds normal.    Data    Data Review:    Recent Labs     04/15/22  0609 22  0549 22  0533   WBC 5.3 5.5 5.0   HGB 12.5* 11.0* 11.2*   HCT 37.9* 33.6* 34.5*   MCV 94.2 92.6 94.6    213 225     Recent Labs     04/15/22  0609 22  0549 22  0533    136 138   K 3.9 4.2 4.2    108 110   CO2 22 21 21   BUN 17 19 24*   CREATININE 0.9 0.8 0.8     No results for input(s): AST, ALT, ALB, BILIDIR, BILITOT, ALKPHOS in the last 72 hours. No results for input(s): LIPASE, AMYLASE in the last 72 hours. No results for input(s): PROTIME, INR in the last 72 hours. No results for input(s): PTT in the last 72 hours.     Radiology Review:        ASSESSMENT diarrhea etiology most likely from chemotherapy  Infectious work-up including Shigella Campylobacter Salmonella normal  C. difficile      PLAN at this time the diarrhea has slowed down  I do not see that the patient needs a colonoscopy evaluation     unless he was to continue to have severe profuse diarrhea  To look for microscopic or lymphocytic colitis or reaction to his Michael Vargas MD   [facility]

## 2022-04-17 NOTE — PLAN OF CARE
Problem: Falls - Risk of:  Goal: Will remain free from falls  Description: Will remain free from falls  4/17/2022 0917 by Nicky Ruelas RN  Outcome: Ongoing  4/17/2022 0201 by Marian Brown RN  Outcome: Ongoing  Goal: Absence of physical injury  Description: Absence of physical injury  4/17/2022 0917 by Nicky Ruelas RN  Outcome: Ongoing  4/17/2022 0201 by Marian Brown RN  Outcome: Ongoing     Problem: Skin Integrity:  Goal: Will show no infection signs and symptoms  Description: Will show no infection signs and symptoms  4/17/2022 0917 by Nicky Ruelas RN  Outcome: Ongoing  4/17/2022 0201 by Marian Brown RN  Outcome: Ongoing  Goal: Absence of new skin breakdown  Description: Absence of new skin breakdown  4/17/2022 0917 by Nicky Ruelas RN  Outcome: Ongoing  4/17/2022 0201 by Marian Brown RN  Outcome: Ongoing     Problem: Nutrition  Goal: Optimal nutrition therapy  4/17/2022 0917 by Nicky Ruelas RN  Outcome: Ongoing  4/17/2022 0201 by Marian Brown RN  Outcome: Ongoing     Problem: Pain:  Goal: Pain level will decrease  Description: Pain level will decrease  4/17/2022 0917 by Nicky Ruelas RN  Outcome: Ongoing  4/17/2022 0201 by Marian Brown RN  Outcome: Ongoing  Goal: Control of acute pain  Description: Control of acute pain  4/17/2022 0917 by Nicky Ruelas RN  Outcome: Ongoing  4/17/2022 0201 by Marian Brown RN  Outcome: Ongoing  Goal: Control of chronic pain  Description: Control of chronic pain  4/17/2022 0917 by Nicky Ruelas RN  Outcome: Ongoing  4/17/2022 0201 by Marian Brown RN  Outcome: Ongoing

## 2022-04-17 NOTE — PROGRESS NOTES
appreciated  Cardiovascular: Regular rate and rhythm no murmurs appreciated  Respiratory: CTAB no wheezing  Gastrointestinal: Abdomen soft, non-tender, BS+  EXT: RLE>LLE swelling  Neurology: no gross focal deficts  Psychiatry: Appropriate affect. Not agitated  Skin: Warm, dry, no rashes appreciated    Labs and Tests:  CBC:   Recent Labs     04/15/22  0609 04/16/22  0549 04/17/22  0533   WBC 5.3 5.5 5.0   HGB 12.5* 11.0* 11.2*    213 225     BMP:    Recent Labs     04/15/22  0609 04/16/22  0549 04/17/22  0533    136 138   K 3.9 4.2 4.2    108 110   CO2 22 21 21   BUN 17 19 24*   CREATININE 0.9 0.8 0.8   GLUCOSE 91 147* 109*     Hepatic:   No results for input(s): AST, ALT, ALB, BILITOT, ALKPHOS in the last 72 hours. VL Extremity Venous Right         CT Head WO Contrast   Final Result   No acute intracranial abnormality. Moderate senescent changes with parenchymal volume loss and chronic small   vessel ischemic changes. XR HIP 2-3 VW W PELVIS RIGHT   Final Result   1. No acute abnormality. Recent imaging reviewed    Problem List  Principal Problem:    DVT femoral (deep venous thrombosis) with thrombophlebitis, bilateral (HCC)  Active Problems:    Severe malnutrition (HCC)  Resolved Problems:    * No resolved hospital problems.  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

## 2022-04-17 NOTE — PROGRESS NOTES
Head to toe assessment complete. Vital signs obtained. Pt resting in bed at this time. Nightly medication administered. Pt tolerated well. Pt denies pain. Pt denies further needs at this time. Call light in hand. Pt verbalizes correct use. Bed alarm set.  Electronically signed by Narinder Sage RN on 4/16/2022 at 9:35 PM

## 2022-04-18 LAB
ANION GAP SERPL CALCULATED.3IONS-SCNC: 7 MMOL/L (ref 3–16)
BASOPHILS ABSOLUTE: 0 K/UL (ref 0–0.2)
BASOPHILS RELATIVE PERCENT: 0.3 %
BUN BLDV-MCNC: 19 MG/DL (ref 7–20)
CALCIUM SERPL-MCNC: 7.6 MG/DL (ref 8.3–10.6)
CHLORIDE BLD-SCNC: 109 MMOL/L (ref 99–110)
CO2: 20 MMOL/L (ref 21–32)
CREAT SERPL-MCNC: 0.7 MG/DL (ref 0.8–1.3)
EOSINOPHILS ABSOLUTE: 0 K/UL (ref 0–0.6)
EOSINOPHILS RELATIVE PERCENT: 0.5 %
GFR AFRICAN AMERICAN: >60
GFR NON-AFRICAN AMERICAN: >60
GLUCOSE BLD-MCNC: 118 MG/DL (ref 70–99)
HCT VFR BLD CALC: 35.7 % (ref 40.5–52.5)
HEMOGLOBIN: 11.7 G/DL (ref 13.5–17.5)
LYMPHOCYTES ABSOLUTE: 0.8 K/UL (ref 1–5.1)
LYMPHOCYTES RELATIVE PERCENT: 16.9 %
MCH RBC QN AUTO: 30.6 PG (ref 26–34)
MCHC RBC AUTO-ENTMCNC: 32.8 G/DL (ref 31–36)
MCV RBC AUTO: 93.3 FL (ref 80–100)
MONOCYTES ABSOLUTE: 0.1 K/UL (ref 0–1.3)
MONOCYTES RELATIVE PERCENT: 3 %
NEUTROPHILS ABSOLUTE: 3.8 K/UL (ref 1.7–7.7)
NEUTROPHILS RELATIVE PERCENT: 79.3 %
PDW BLD-RTO: 18.1 % (ref 12.4–15.4)
PLATELET # BLD: 194 K/UL (ref 135–450)
PMV BLD AUTO: 6.9 FL (ref 5–10.5)
POTASSIUM REFLEX MAGNESIUM: 4 MMOL/L (ref 3.5–5.1)
RBC # BLD: 3.83 M/UL (ref 4.2–5.9)
SODIUM BLD-SCNC: 136 MMOL/L (ref 136–145)
WBC # BLD: 4.8 K/UL (ref 4–11)

## 2022-04-18 PROCEDURE — 85025 COMPLETE CBC W/AUTO DIFF WBC: CPT

## 2022-04-18 PROCEDURE — 97116 GAIT TRAINING THERAPY: CPT

## 2022-04-18 PROCEDURE — 6370000000 HC RX 637 (ALT 250 FOR IP): Performed by: STUDENT IN AN ORGANIZED HEALTH CARE EDUCATION/TRAINING PROGRAM

## 2022-04-18 PROCEDURE — 97530 THERAPEUTIC ACTIVITIES: CPT

## 2022-04-18 PROCEDURE — 1200000000 HC SEMI PRIVATE

## 2022-04-18 PROCEDURE — 80048 BASIC METABOLIC PNL TOTAL CA: CPT

## 2022-04-18 PROCEDURE — 6370000000 HC RX 637 (ALT 250 FOR IP): Performed by: INTERNAL MEDICINE

## 2022-04-18 PROCEDURE — 2580000003 HC RX 258: Performed by: INTERNAL MEDICINE

## 2022-04-18 RX ADMIN — LISINOPRIL 5 MG: 5 TABLET ORAL at 08:21

## 2022-04-18 RX ADMIN — ATENOLOL 50 MG: 50 TABLET ORAL at 08:20

## 2022-04-18 RX ADMIN — SODIUM CHLORIDE: 9 INJECTION, SOLUTION INTRAVENOUS at 04:32

## 2022-04-18 RX ADMIN — OXYCODONE 10 MG: 5 TABLET ORAL at 20:14

## 2022-04-18 RX ADMIN — ATORVASTATIN CALCIUM 10 MG: 10 TABLET, FILM COATED ORAL at 20:10

## 2022-04-18 RX ADMIN — APIXABAN 10 MG: 5 TABLET, FILM COATED ORAL at 20:10

## 2022-04-18 RX ADMIN — GLYCERIN, PETROLATUM, PHENYLEPHRINE HCL, PRAMOXINE HCL 1 EACH: 144; 2.5; 10; 15 CREAM TOPICAL at 20:18

## 2022-04-18 RX ADMIN — APIXABAN 10 MG: 5 TABLET, FILM COATED ORAL at 08:21

## 2022-04-18 ASSESSMENT — PAIN DESCRIPTION - LOCATION: LOCATION: EYE

## 2022-04-18 ASSESSMENT — PAIN SCALES - GENERAL
PAINLEVEL_OUTOF10: 2
PAINLEVEL_OUTOF10: 6
PAINLEVEL_OUTOF10: 6

## 2022-04-18 ASSESSMENT — PAIN DESCRIPTION - PAIN TYPE: TYPE: ACUTE PAIN

## 2022-04-18 NOTE — CARE COORDINATION
Received call from Gisela at [facility] and they can accept patient and will start pre cert. CM spoke to pt and spouse with Gisela on phone and they are fine with [facility]. CM set up tentative transport for tomorrow at 5pm with [facility] pending pre cert.      Leonel Cummings RN, BSN  [phone]

## 2022-04-18 NOTE — PROGRESS NOTES
PM assessment complete and documented. Meds given per MAR. Pt resting between care. Urinal at bedside. Medicated per request with oxycodone. No other needs or concerns expressed. Will continue to monitor.

## 2022-04-18 NOTE — PROGRESS NOTES
Gastroenterology Progress Note    Sandro Horton is a 78 y.o. male patient. Principal Problem:    DVT femoral (deep venous thrombosis) with thrombophlebitis, bilateral (HCC)  Active Problems:    Severe malnutrition (HCC)  Resolved Problems:    * No resolved hospital problems. *      SUBJECTIVE:  Diarrhea resolved. Had a formed BM last night per notes. ROS:  No fever, chills  No chest pain, palpitations  No SOB, cough  Gastrointestinal ROS: see above    Physical    VITALS:  BP (!) 150/77   Pulse 79   Temp 98 °F (36.7 °C) (Oral)   Resp 18   Ht 5' 10\" (1.778 m)   Wt 124 lb 11.2 oz (56.6 kg)   SpO2 93%   BMI 17.89 kg/m²   TEMPERATURE:  Current - Temp: 98 °F (36.7 °C); Max - Temp  Av.1 °F (36.7 °C)  Min: 97.7 °F (36.5 °C)  Max: 98.3 °F (36.8 °C)    NAD  Regular rate   Abdomen soft, ND, NT,  Bowel sounds normal.  Anicteric    Data    Data Review:    Recent Labs     22  0549 22  0533 22  0430   WBC 5.5 5.0 4.8   HGB 11.0* 11.2* 11.7*   HCT 33.6* 34.5* 35.7*   MCV 92.6 94.6 93.3    225 194     Recent Labs     22  0549 22  0533 22  0430    138 136   K 4.2 4.2 4.0    110 109   CO2 21 21 20*   BUN 19 24* 19   CREATININE 0.8 0.8 0.7*     No results for input(s): AST, ALT, ALB, BILIDIR, BILITOT, ALKPHOS in the last 72 hours. No results for input(s): LIPASE, AMYLASE in the last 72 hours. No results for input(s): PROTIME, INR in the last 72 hours. No results for input(s): PTT in the last 72 hours. ASSESSMENT / PLAN :      Diarrhea -began 3 weeks PTA. Colonoscopy back in February was okay. diarrhea resolved. had a formed BM last night.      Metastatic esophageal cancer     Acute DVT of RLE -on anticoagulation. Will sign off. Please call if questions. Discussed with Dr. Dalila Baptiste, [facility]        I have personally performed a face to face diagnostic evaluation on this patient.   I have interviewed and examined the patient and I agree with the findings and recommended plan of care. In summary, my findings and plan are the following:  Diarrhea resolved. Had formed stool. Ab nt. Will sign off.        Brandi Webb MD  [address]

## 2022-04-18 NOTE — PROGRESS NOTES
[facility]   Progress Note      4/18/2022 9:18 AM        Name: Akil Duran . Admitted: 4/12/2022    SUBJECTIVE:  He is feeling well,  Denies n/v/d/c,  Tolerated chemotherapy over the weekend without problems. Planning on SNF placement soon. Reviewed interval ancillary notes    Current Medications  apixaban (ELIQUIS) tablet 10 mg, BID   Followed by  Armand Diggs ON 4/21/2022] apixaban (ELIQUIS) tablet 5 mg, BID  pramox-PE-glycerin-petrolatum cream, BID  oxyCODONE (ROXICODONE) immediate release tablet 10 mg, Q6H PRN  loperamide (IMODIUM) capsule 2 mg, 4x Daily PRN  atenolol (TENORMIN) tablet 50 mg, Daily  lisinopril (PRINIVIL;ZESTRIL) tablet 5 mg, Daily  atorvastatin (LIPITOR) tablet 10 mg, Nightly  sodium chloride flush 0.9 % injection 5-40 mL, 2 times per day  sodium chloride flush 0.9 % injection 5-40 mL, PRN  0.9 % sodium chloride infusion, PRN  ondansetron (ZOFRAN-ODT) disintegrating tablet 4 mg, Q8H PRN   Or  ondansetron (ZOFRAN) injection 4 mg, Q6H PRN  polyethylene glycol (GLYCOLAX) packet 17 g, Daily PRN  acetaminophen (TYLENOL) tablet 650 mg, Q6H PRN   Or  acetaminophen (TYLENOL) suppository 650 mg, Q6H PRN        Objective:  BP (!) 150/77   Pulse 79   Temp 98 °F (36.7 °C) (Oral)   Resp 18   Ht 5' 10\" (1.778 m)   Wt 124 lb 11.2 oz (56.6 kg)   SpO2 93%   BMI 17.89 kg/m²     Intake/Output Summary (Last 24 hours) at 4/18/2022 0918  Last data filed at 4/18/2022 0015  Gross per 24 hour   Intake 480 ml   Output 775 ml   Net -295 ml      Wt Readings from Last 3 Encounters:   04/15/22 124 lb 11.2 oz (56.6 kg)   02/15/22 121 lb 4.1 oz (55 kg)   02/02/22 156 lb (70.8 kg)       General appearance:  Appears comfortable  Eyes: Sclera clear. Pupils equal.  ENT: Moist oral mucosa. Trachea midline, no adenopathy. Cardiovascular: Regular rhythm, normal S1, S2. No murmur. No edema in lower extremities  Respiratory: Not using accessory muscles. Good inspiratory effort.  Clear to auscultation bilaterally, no wheeze or crackles. GI: Abdomen soft, no tenderness, not distended  Musculoskeletal: No cyanosis in digits, neck supple  Neurology: CN 2-12 grossly intact. No speech or motor deficits  Psych: Normal affect. Alert and oriented in time, place and person  Skin: Warm, dry, normal turgor    Labs and Tests:  CBC:   Recent Labs     04/16/22  0549 04/17/22  0533 04/18/22  0430   WBC 5.5 5.0 4.8   HGB 11.0* 11.2* 11.7*    225 194     BMP:    Recent Labs     04/16/22  0549 04/17/22  0533 04/18/22  0430    138 136   K 4.2 4.2 4.0    110 109   CO2 21 21 20*   BUN 19 24* 19   CREATININE 0.8 0.8 0.7*   GLUCOSE 147* 109* 118*     Hepatic: No results for input(s): AST, ALT, ALB, BILITOT, ALKPHOS in the last 72 hours. ASSESSMENT AND PLAN    Principal Problem:    DVT femoral (deep venous thrombosis) with thrombophlebitis, bilateral (HCC)  Active Problems:    Severe malnutrition (HCC)  Resolved Problems:    * No resolved hospital problems. *      1. 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 - Last treatment of trastuzumab and pembrolizumab only was given on 3/29/2022.   - He was due for FOLFOX treatment on 4/5/2022, but it was held due to neutropenia. - patient will receive FOLFOX today in order to prevent further treatment delay.  -s/p FOLFOX on 4/15/22 through 4/17/22    2. Acute DVT of right lower extremity  - Venous doppler showed totally occluding deep vein thrombosis involving the right peroneal vein and right popliteal vein  - Hx of atrial fibrillation, but has not been on anticoagulation d/t previous GI bleed  -On Eliquis     3. Right hip pain  - MRI hip on 3/31/2022 revealed metastatic lesion with no pathological fracture. - He has been referred to oncology orthopedic surgery. If no surgical intervention is recommended, may consider palliative radiation to hip.      4. Diarrhea  - c diff negative 4/14/22  - GI on board  - improved    OK for discharge from oncology perspective. We will arrange for follow up with Dr. Naomi Meek. Mario Alberto Ghosh CNP  [facility]    Patient was seen and examined. Agree with above. Continue current care. Hopefully to rehab soon.     Emmy Whitaker MD

## 2022-04-18 NOTE — PROGRESS NOTES
Patient stated that he is getting nauseous after eating and said that he threw up both lunch and dinner.

## 2022-04-18 NOTE — CARE COORDINATION
CM spent time talking to pt and spouse at bedside. Discussed [facility] no beds available and could refer to [facility]ious or [facility] but spouse does not want to drive that distance and patient unsure if he can tolerate intense therapy. CM informed left messages for [facility] and [facility] and waiting to hear back. Spouse states did not receive ratings list. CM provided and discussed the above facility ratings. Spouse is also looking at snf list again. Explained when facility accepts pt is stable for discharge, spouse really wants [facility]. CM called [facility] [phone] and spoke to [facility] and they are reviewing pt. CM called Main number at [facility] [phone] and spoke to [facility] who transferred me to [address] [place] [address]. and she states T[facility] is off until tomorrow and no epic access and asked for information to be faxed. She will call back after received. CM faxed to [facility] [phone]: demographics, H &bP, d/c summary, MAR, PT note (OT pending message sent to Mary Callahan for the team), oncology and GI last notes.      Jamin Melendez, RN, BSN  [phone]

## 2022-04-18 NOTE — CARE COORDINATION
MIREYA sent referral over epic to [facility] at 1:13pm per spouse and pt request.     MIREYA called Iveth at [phone] admissions [phone] and she took pt information to review for admission. She is aware OT notes pending at this time.     Adria Dancer, RN, BSN  [phone]

## 2022-04-18 NOTE — PROGRESS NOTES
Patient is content. Alert and oriented. Complaining of a little eye pain. Pt having soft formed stools. Pt has gotten up to bathroom and worked with PT/OT. Family is frustrated about the length of discharge process. Vitals stable will continue to monitor.

## 2022-04-18 NOTE — PROGRESS NOTES
Physical Therapy  Facility/Department: [address] ONCOLOGY  Daily Treatment Note  NAME: Caro Diego  : 1940  MRN: [id number]    Date of Service: 2022    Discharge Recommendations:  Caro Diego scored a 17/24 on the AM-PAC short mobility form. Current research shows that an AM-PAC score of 17 or less is typically not associated with a discharge to the patient's home setting. Based on the patient's AM-PAC score and their current functional mobility deficits, it is recommended that the patient have 5-7 sessions per week of Physical Therapy at d/c to increase the patient's independence. At this time, this patient demonstrates the endurance, and/or tolerance for 3 hours of therapy each day, with a treatment frequency of 5-7x/wk. Please see assessment section for further patient specific details. If patient discharges prior to next session this note will serve as a discharge summary. Please see below for the latest assessment towards goals. PT Equipment Recommendations  Equipment Needed: No  Other: Defer to next level of care. Assessment   Body structures, Functions, Activity limitations: Decreased functional mobility ; Decreased posture;Decreased endurance;Decreased ROM; Decreased strength;Decreased balance;Decreased safe awareness;Decreased vision/visual deficit  Assessment: pt continues to require increased time to complete safe functional mobility and tasks. pt presents below baseline requiring use of RW and min assist for ambulation this session. pt will benefit from continued skilled therapy services to facilitate return to PLOF and reduce fall risk  Treatment Diagnosis: Impaired functional mobility  Prognosis: Good  PT Education: Goals;PT Role;Plan of Care;Gait Training;Family Education;General Safety; Functional Mobility Training;Energy Conservation;Transfer Training  Patient Education: Pt verbalized understanding, needs reinforcement.   REQUIRES PT FOLLOW UP: Yes  Activity Tolerance  Activity Tolerance: Patient limited by fatigue  Activity Tolerance: pt fatigued quickly with all OOB mobility and functional tasks     Patient Diagnosis(es): The primary encounter diagnosis was Acute deep vein thrombosis (DVT) of right peroneal vein ([address] Diagnoses of General weakness, Fall in home, initial encounter, Hip pain, right, Malignant neoplasm of esophagus, unspecified location ([address] and Acute deep vein thrombosis (DVT) of tibial vein of right lower extremity ([address] were also pertinent to this visit. has a past medical history of Allergic rhinitis, Anxiety state, CAD (coronary artery disease), Cataract, Chronic ischemic heart disease, Esophageal reflux, Essential hypertension, Glaucoma, Influenza, Insomnia, and Other and unspecified hyperlipidemia. has a past surgical history that includes Coronary angioplasty with stent; eye surgery; hernia repair; Colonoscopy; ERCP (01/16/2018); Cholecystectomy, laparoscopic (01/17/2018); Upper gastrointestinal endoscopy (N/A, 2/2/2022); Colonoscopy (N/A, 2/2/2022); and IR PORT PLACEMENT > 5 YEARS (2/15/2022). Restrictions  Restrictions/Precautions  Restrictions/Precautions: Fall Risk,Up as Tolerated (high fall risk)  Required Braces or Orthoses?: No  Position Activity Restriction  Other position/activity restrictions: Ligia Fry is a 78 y.o. male in February with acute GI bleed required 1 unit of PRBCs had a EGD and colonoscopy which showed small colon polyps and extensive esophageal tumors found to be adenocarcinoma of the.  Patient has been having chemotherapy for this today patient felt that his wife called EMS patient had 3 falls over the last couple weeks patient has become extremely weak poor p.o. intake no chest pain no lightheadedness or dizziness.   Patient had increased right lower extremity swelling compared to left extremity had ultrasound performed which showed right lower extremity DVT  Subjective   General  Chart Reviewed: Yes  Response To Previous Treatment: Patient with no complaints from previous session. Family / Caregiver Present: Yes (spouse)  Subjective  Subjective: pt supine at start of session, agreeable to therapy this afternoon  Pain Screening  Patient Currently in Pain: Denies  Vital Signs  Patient Currently in Pain: Denies       Orientation  Orientation  Overall Orientation Status: Within Normal Limits    Objective   Bed mobility  Supine to Sit: Contact guard assistance  Sit to Supine: Unable to assess (pt sitting up at end of session)  Scooting: Stand by assistance  Comment: HOB elevated, use of side rail  Transfers  Sit to Stand: Contact guard assistance;Minimal Assistance  Stand to sit: Minimal Assistance (poor eccentric control)  Comment: toilet transfer with use of grab bar and min assist  Ambulation  Ambulation?: Yes  Ambulation 1  Surface: level tile  Device: Rolling Walker  Assistance: Contact guard assistance  Quality of Gait: Decreased denise, increased trunk flexion throughout gait cycle, normal BRYSON, decreased step length and step height  Distance: 100' x 1, 10' x 2 with seated rest break in b/w  Stairs/Curb  Stairs?: No     Balance  Posture: Fair (rounded shoulders)  Sitting - Static: Good  Sitting - Dynamic: Good;-  Standing - Static: Fair  Standing - Dynamic: Fair;-  Comments: sitting balance on toilet with CGA/SBA to move outside of BRYSON. standing balance at sink with UE support and CGA.   assist to don depends                               AM-PAC Score  AM-PAC Inpatient Mobility Raw Score : 17 (04/18/22 1501)  AM-PAC Inpatient T-Scale Score : 42.13 (04/18/22 1501)  Mobility Inpatient CMS 0-100% Score: 50.57 (04/18/22 1501)  Mobility Inpatient CMS G-Code Modifier : CK (04/18/22 1501)          Goals  Short term goals  Time Frame for Short term goals: Upon discharge  Short term goal 1: Pt will transfer supine to/from sit with supervision  Short term goal 2: Pt will transfer sit to/from stand with supervision  Short term goal 3: Pt will amb 76' with RW and supervision  Long term goals  Time Frame for Long term goals : LTG=STG  Patient Goals   Patient goals : Pt did not state  No goals met    Plan    Plan  Times per week: 3-5  Times per day: Daily  Current Treatment Recommendations: Amelia Guerra Re-education,Patient/Caregiver Education & Training,ROM,Balance Training,Gait Training,Safety Education & Training,Stair training,Functional Mobility Training  Safety Devices  Type of devices:  All fall risk precautions in place,Call light within reach,Chair alarm in place,Telesitter in use,Left in chair,Nurse notified  Restraints  Initially in place: No     Therapy Time   Individual Concurrent Group Co-treatment   Time In 1300         Time Out 1340         Minutes 40         Timed Code Treatment Minutes: [facility] PT, [facility]

## 2022-04-19 LAB
ANION GAP SERPL CALCULATED.3IONS-SCNC: 9 MMOL/L (ref 3–16)
BASOPHILS ABSOLUTE: 0 K/UL (ref 0–0.2)
BASOPHILS RELATIVE PERCENT: 0.6 %
BUN BLDV-MCNC: 16 MG/DL (ref 7–20)
CALCIUM SERPL-MCNC: 7.6 MG/DL (ref 8.3–10.6)
CHLORIDE BLD-SCNC: 105 MMOL/L (ref 99–110)
CO2: 20 MMOL/L (ref 21–32)
CREAT SERPL-MCNC: 0.6 MG/DL (ref 0.8–1.3)
EOSINOPHILS ABSOLUTE: 0 K/UL (ref 0–0.6)
EOSINOPHILS RELATIVE PERCENT: 0.8 %
GFR AFRICAN AMERICAN: >60
GFR NON-AFRICAN AMERICAN: >60
GLUCOSE BLD-MCNC: 110 MG/DL (ref 70–99)
HCT VFR BLD CALC: 36.6 % (ref 40.5–52.5)
HEMOGLOBIN: 11.9 G/DL (ref 13.5–17.5)
LYMPHOCYTES ABSOLUTE: 0.9 K/UL (ref 1–5.1)
LYMPHOCYTES RELATIVE PERCENT: 19 %
MCH RBC QN AUTO: 30.2 PG (ref 26–34)
MCHC RBC AUTO-ENTMCNC: 32.5 G/DL (ref 31–36)
MCV RBC AUTO: 92.8 FL (ref 80–100)
MONOCYTES ABSOLUTE: 0.1 K/UL (ref 0–1.3)
MONOCYTES RELATIVE PERCENT: 1.6 %
NEUTROPHILS ABSOLUTE: 3.5 K/UL (ref 1.7–7.7)
NEUTROPHILS RELATIVE PERCENT: 78 %
PDW BLD-RTO: 17.9 % (ref 12.4–15.4)
PLATELET # BLD: 191 K/UL (ref 135–450)
PMV BLD AUTO: 6.7 FL (ref 5–10.5)
POTASSIUM REFLEX MAGNESIUM: 3.9 MMOL/L (ref 3.5–5.1)
RBC # BLD: 3.94 M/UL (ref 4.2–5.9)
SARS-COV-2, NAAT: NOT DETECTED
SODIUM BLD-SCNC: 134 MMOL/L (ref 136–145)
WBC # BLD: 4.5 K/UL (ref 4–11)

## 2022-04-19 PROCEDURE — 6370000000 HC RX 637 (ALT 250 FOR IP): Performed by: STUDENT IN AN ORGANIZED HEALTH CARE EDUCATION/TRAINING PROGRAM

## 2022-04-19 PROCEDURE — 97530 THERAPEUTIC ACTIVITIES: CPT

## 2022-04-19 PROCEDURE — 94760 N-INVAS EAR/PLS OXIMETRY 1: CPT

## 2022-04-19 PROCEDURE — 2580000003 HC RX 258: Performed by: INTERNAL MEDICINE

## 2022-04-19 PROCEDURE — 80048 BASIC METABOLIC PNL TOTAL CA: CPT

## 2022-04-19 PROCEDURE — 85025 COMPLETE CBC W/AUTO DIFF WBC: CPT

## 2022-04-19 PROCEDURE — 1200000000 HC SEMI PRIVATE

## 2022-04-19 PROCEDURE — 97535 SELF CARE MNGMENT TRAINING: CPT

## 2022-04-19 PROCEDURE — 87635 SARS-COV-2 COVID-19 AMP PRB: CPT

## 2022-04-19 PROCEDURE — 97116 GAIT TRAINING THERAPY: CPT

## 2022-04-19 PROCEDURE — 6370000000 HC RX 637 (ALT 250 FOR IP): Performed by: INTERNAL MEDICINE

## 2022-04-19 RX ADMIN — APIXABAN 10 MG: 5 TABLET, FILM COATED ORAL at 09:17

## 2022-04-19 RX ADMIN — APIXABAN 10 MG: 5 TABLET, FILM COATED ORAL at 21:05

## 2022-04-19 RX ADMIN — ATORVASTATIN CALCIUM 10 MG: 10 TABLET, FILM COATED ORAL at 21:05

## 2022-04-19 RX ADMIN — OXYCODONE 10 MG: 5 TABLET ORAL at 23:31

## 2022-04-19 RX ADMIN — LISINOPRIL 5 MG: 5 TABLET ORAL at 09:20

## 2022-04-19 RX ADMIN — GLYCERIN, PETROLATUM, PHENYLEPHRINE HCL, PRAMOXINE HCL 1 EACH: 144; 2.5; 10; 15 CREAM TOPICAL at 09:31

## 2022-04-19 RX ADMIN — SODIUM CHLORIDE, PRESERVATIVE FREE 10 ML: 5 INJECTION INTRAVENOUS at 09:23

## 2022-04-19 RX ADMIN — ATENOLOL 50 MG: 50 TABLET ORAL at 09:20

## 2022-04-19 RX ADMIN — GLYCERIN, PETROLATUM, PHENYLEPHRINE HCL, PRAMOXINE HCL 1 TUBE: 144; 2.5; 10; 15 CREAM TOPICAL at 21:10

## 2022-04-19 ASSESSMENT — PAIN SCALES - GENERAL
PAINLEVEL_OUTOF10: 0
PAINLEVEL_OUTOF10: 7

## 2022-04-19 NOTE — PROGRESS NOTES
[facility]    4/19/2022 11:03 AM        Name: Marylin Ugalde . Admitted: 4/12/2022  Primary Care Provider: Janina Sy MD (Tel: [phone])        Subjective:    Patient lying in bed no shortness of breath fevers or chills  Diarrhea improved    Reviewed interval ancillary notes    Current Medications  apixaban (ELIQUIS) tablet 10 mg, BID   Followed by  Reno Thompson ON 4/21/2022] apixaban (ELIQUIS) tablet 5 mg, BID  pramox-PE-glycerin-petrolatum cream, BID  oxyCODONE (ROXICODONE) immediate release tablet 10 mg, Q6H PRN  loperamide (IMODIUM) capsule 2 mg, 4x Daily PRN  atenolol (TENORMIN) tablet 50 mg, Daily  lisinopril (PRINIVIL;ZESTRIL) tablet 5 mg, Daily  atorvastatin (LIPITOR) tablet 10 mg, Nightly  sodium chloride flush 0.9 % injection 5-40 mL, 2 times per day  sodium chloride flush 0.9 % injection 5-40 mL, PRN  0.9 % sodium chloride infusion, PRN  ondansetron (ZOFRAN-ODT) disintegrating tablet 4 mg, Q8H PRN   Or  ondansetron (ZOFRAN) injection 4 mg, Q6H PRN  polyethylene glycol (GLYCOLAX) packet 17 g, Daily PRN  acetaminophen (TYLENOL) tablet 650 mg, Q6H PRN   Or  acetaminophen (TYLENOL) suppository 650 mg, Q6H PRN        Objective:  /80   Pulse 89   Temp 97.7 °F (36.5 °C) (Oral)   Resp 16   Ht 5' 10\" (1.778 m)   Wt 124 lb 11.2 oz (56.6 kg)   SpO2 94%   BMI 17.89 kg/m²     Intake/Output Summary (Last 24 hours) at 4/19/2022 1103  Last data filed at 4/18/2022 2337  Gross per 24 hour   Intake 1209.37 ml   Output 600 ml   Net 609.37 ml      Wt Readings from Last 3 Encounters:   04/15/22 124 lb 11.2 oz (56.6 kg)   02/15/22 121 lb 4.1 oz (55 kg)   02/02/22 156 lb (70.8 kg)     General appearance:  Appears comfortable.  AAOx3  HEENT: atraumatic, Pupils equal, muscous membranes moist, no masses appreciated  Cardiovascular: Regular rate and rhythm no murmurs appreciated  Respiratory: CTAB no wheezing  Gastrointestinal: Abdomen soft, non-tender, BS+  EXT: RLE>LLE swelling  Neurology: no gross focal deficts  Psychiatry: Appropriate affect. Not agitated  Skin: Warm, dry, no rashes appreciated    Labs and Tests:  CBC:   Recent Labs     04/17/22  0533 04/18/22  0430 04/19/22  0620   WBC 5.0 4.8 4.5   HGB 11.2* 11.7* 11.9*    194 191     BMP:    Recent Labs     04/17/22  0533 04/18/22  0430 04/19/22  0620    136 134*   K 4.2 4.0 3.9    109 105   CO2 21 20* 20*   BUN 24* 19 16   CREATININE 0.8 0.7* 0.6*   GLUCOSE 109* 118* 110*     Hepatic:   No results for input(s): AST, ALT, ALB, BILITOT, ALKPHOS in the last 72 hours. VL Extremity Venous Right         CT Head WO Contrast   Final Result   No acute intracranial abnormality. Moderate senescent changes with parenchymal volume loss and chronic small   vessel ischemic changes. XR HIP 2-3 VW W PELVIS RIGHT   Final Result   1. No acute abnormality. Recent imaging reviewed    Problem List  Principal Problem:    DVT femoral (deep venous thrombosis) with thrombophlebitis, bilateral (HCC)  Active Problems:    Severe malnutrition (HCC)  Resolved Problems:    * No resolved hospital problems.  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

## 2022-04-19 NOTE — PROGRESS NOTES
Oncology and Hematology Care   Progress Note      4/19/2022 1:30 PM        Name: Ranjana Benson . Admitted: 4/12/2022    SUBJECTIVE:  He is feeling better today. He reports that his right hip pain is better. Wife is at bedside and reports that he is being discharged at [address] [place] [address],[address] today. Reviewed interval ancillary notes    Current Medications  apixaban (ELIQUIS) tablet 10 mg, BID   Followed by  Tucker Mame ON 4/21/2022] apixaban (ELIQUIS) tablet 5 mg, BID  pramox-PE-glycerin-petrolatum cream, BID  oxyCODONE (ROXICODONE) immediate release tablet 10 mg, Q6H PRN  loperamide (IMODIUM) capsule 2 mg, 4x Daily PRN  atenolol (TENORMIN) tablet 50 mg, Daily  lisinopril (PRINIVIL;ZESTRIL) tablet 5 mg, Daily  atorvastatin (LIPITOR) tablet 10 mg, Nightly  sodium chloride flush 0.9 % injection 5-40 mL, 2 times per day  sodium chloride flush 0.9 % injection 5-40 mL, PRN  0.9 % sodium chloride infusion, PRN  ondansetron (ZOFRAN-ODT) disintegrating tablet 4 mg, Q8H PRN   Or  ondansetron (ZOFRAN) injection 4 mg, Q6H PRN  polyethylene glycol (GLYCOLAX) packet 17 g, Daily PRN  acetaminophen (TYLENOL) tablet 650 mg, Q6H PRN   Or  acetaminophen (TYLENOL) suppository 650 mg, Q6H PRN        Objective:  /80   Pulse 89   Temp 97.7 °F (36.5 °C) (Oral)   Resp 16   Ht 5' 10\" (1.778 m)   Wt 124 lb 11.2 oz (56.6 kg)   SpO2 94%   BMI 17.89 kg/m²     Intake/Output Summary (Last 24 hours) at 4/19/2022 1330  Last data filed at 4/18/2022 2337  Gross per 24 hour   Intake 909.37 ml   Output 400 ml   Net 509.37 ml      Wt Readings from Last 3 Encounters:   04/15/22 124 lb 11.2 oz (56.6 kg)   02/15/22 121 lb 4.1 oz (55 kg)   02/02/22 156 lb (70.8 kg)       General appearance:  Appears comfortable  Eyes: Sclera clear. Pupils equal.  ENT: Moist oral mucosa. Trachea midline, no adenopathy. Cardiovascular: Regular rhythm, normal S1, S2. No murmur. 1+ BLE edema. Respiratory: Not using accessory muscles.  Clear to auscultation bilaterally, no wheeze or crackles. GI: Abdomen soft, no tenderness, not distended  Musculoskeletal: No cyanosis in digits, neck supple  Neurology: CN 2-12 grossly intact. No speech or motor deficits  Psych: Normal affect. Alert and oriented in time, place and person  Skin: Warm, dry, normal turgor    Labs and Tests:  CBC:   Recent Labs     04/17/22  0533 04/18/22  0430 04/19/22  0620   WBC 5.0 4.8 4.5   HGB 11.2* 11.7* 11.9*    194 191     BMP:    Recent Labs     04/17/22  0533 04/18/22  0430 04/19/22  0620    136 134*   K 4.2 4.0 3.9    109 105   CO2 21 20* 20*   BUN 24* 19 16   CREATININE 0.8 0.7* 0.6*   GLUCOSE 109* 118* 110*     Hepatic: No results for input(s): AST, ALT, ALB, BILITOT, ALKPHOS in the last 72 hours. ASSESSMENT AND PLAN    Principal Problem:    DVT femoral (deep venous thrombosis) with thrombophlebitis, bilateral (HCC)  Active Problems:    Severe malnutrition (HCC)  Resolved Problems:    * No resolved hospital problems. *      1. 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 - Last treatment of trastuzumab and pembrolizumab only was given on 3/29/2022.   - He was due for FOLFOX treatment on 4/5/2022, but it was held due to neutropenia. - patient will receive FOLFOX today in order to prevent further treatment delay.  -s/p FOLFOX on 4/15/22 through 4/17/22, tolerated well.     2. Acute DVT of right lower extremity  - Venous doppler showed totally occluding deep vein thrombosis involving the right peroneal vein and right popliteal vein  - Hx of atrial fibrillation, but has not been on anticoagulation d/t previous GI bleed  -On Eliquis     3. Right hip pain  - MRI hip on 3/31/2022 revealed metastatic lesion with no pathological fracture. - He has been referred to oncology orthopedic surgery.  If no surgical intervention is recommended, may consider palliative radiation to hip.  - Improved     4. Diarrhea  - c diff negative 4/14/22  - GI on board  - improved    Plan to discharge today, will follow-up with Dr. Marla Cortez outpatient.     Brett Cole, APRN [facility]  Oncology Hematology [facility], [facility].  [phone]

## 2022-04-19 NOTE — PROGRESS NOTES
Additional Occupational Therapy notes attached @ 1400 to [facility] pre certification request that was initiated today. Authorization number: [id number] pending. MIREYA Valentin updated of this process.

## 2022-04-19 NOTE — CARE COORDINATION
Updated OT notes were sent to insurance earlier, awaiting approval. CM changed transport to tomorrow at 5pm [facility]. Pt and spouse updated.      Cameron Arreaga RN, BSN  [phone]

## 2022-04-19 NOTE — PLAN OF CARE
Nutrition Problem #1: Severe malnutrition  Intervention: Food and/or Nutrient Delivery: Continue Current Diet,Continue Oral Nutrition Supplement  Nutritional Goals: Pt to consme 50% of meals consistently

## 2022-04-19 NOTE — CARE COORDINATION
Cm spoke to [address] at [facility] and pre cert not yet started d/t OT notes needed. CM sent message to Romeo Epps Claudeen Arch gone today for update notes as soon as possible. CM updated pt and spouse at bedside.     Scott Mcallister RN, BSN  [phone]

## 2022-04-19 NOTE — CARE COORDINATION
Received vm from [address] at [facility] [phone] requesting rapid covid test. Cm ordered and notified charge RN.     Windy Askew RN, BSN  [phone]

## 2022-04-19 NOTE — PROGRESS NOTES
Nutrition Note    RECOMMENDATIONS  1. PO Diet: Continue current diet  2. ONS: Continue current nutrition supplement    NUTRITION ASSESSMENT   Pt triggered for follow-up. Pt was asleep upon RD visit this morning but per wife in the room, appetite and po intake continues to be poor. Per RN note, pt was nauseous yesterday and threw up both lunch and dinner. Documented meal intakes ranging from 26-75% since last RD visit on 4/15. Supplement was changed to Ensure Compact as pt agreed to try, but wife reported still not consuming nutrition supplements. Said there is not much of a point to try another nutrition supplement since pt will likely be discharged soon. RD will continue to monitor.  Nutrition Related Findings: -1.2L. Na 134. LBM 4/18, BS+. +2 pitting BLE.  Wounds: None   Nutrition Education:  Education completed    Nutrition Goals: Pt to consme 50% of meals consistently     MALNUTRITION ASSESSMENT   Context of Malnutrition: Chronic Illness   Malnutrition Status: Severe malnutrition (as determined at initial RD visit)    NUTRITION DIAGNOSIS   · Severe malnutrition related to inadequate protein-energy intake,catabolic illness as evidenced by weight loss greater than or equal to 20% in 1 year,poor intake prior to admission,mild loss of subcutaneous fat,mild muscle loss    CURRENT NUTRITION THERAPIES  ADULT DIET; Regular  ADULT ORAL NUTRITION SUPPLEMENT; Lunch, Dinner; Standard 4 oz Oral Supplement     PO Intake: 26-50%,51-75%   PO Supplement Intake:Refusing to take    ANTHROPOMETRICS   Current Height: 5' 10\" (177.8 cm)   Current Weight: 124 lb 11.2 oz (56.6 kg)     Ideal Body Weight (IBW): 166 lbs  (75 kg)        BMI: 17.8    COMPARATIVE STANDARDS  Energy (kcal):  1710 - 1995     Protein (g):  86 - 103       Fluid (ml/day):  1710 - 1995    The patient will be monitored per nutrition standards of care. Consult dietitian if additional nutrition interventions are needed prior to RD reassessment.      [facility] Alessandra Cheung, [address] [place] [address], [facility] [address]    Contact: [phone]

## 2022-04-19 NOTE — PROGRESS NOTES
Occupational Therapy  Facility/Department: [address] ONCOLOGY  Daily Treatment Note  NAME: Eduar Cox  : 1940  MRN: [id number]    Date of Service: 2022    Discharge Recommendations: Eduar Cox scored a 16/24 on the AM-PAC ADL Inpatient form. Current research shows that an AM-PAC score of 17 or less is typically not associated with a discharge to the patient's home setting. Based on the patient's AM-PAC score and their current ADL deficits, it is recommended that the patient have 5-7 sessions per week of Occupational Therapy at d/c to increase the patient's independence. At this time, this patient demonstrates the endurance, and/or tolerance for 3 hours of therapy each day, with a treatment frequency of 5-7x/wk. Please see assessment section for further patient specific details. If patient discharges prior to next session this note will serve as a discharge summary. Please see below for the latest assessment towards goals. OT Equipment Recommendations  Other: defer to next level    Assessment   Performance deficits / Impairments: Decreased functional mobility ; Decreased endurance;Decreased ADL status; Decreased balance;Decreased safe awareness;Decreased high-level IADLs;Decreased strength  Assessment: Patient presents below baseline function. Continued OT services are indicated in order to address above deficits and maximize patients safety and independence in ADLs, transfers and functional mobility. At this time, patient would not be safe to return home. Treatment Diagnosis: Decreased functional status secondary to DVT and adenocarcinoma  Prognosis: Fair  OT Education: OT Role;Plan of Care;Transfer Training;Energy Conservation  Patient Education: d/c- pt verbalized understanding  Barriers to Learning: Hearing  REQUIRES OT FOLLOW UP: Yes  Activity Tolerance  Activity Tolerance: Patient limited by fatigue  Safety Devices  Safety Devices in place: Yes  Type of devices:  All fall risk precautions in place; Left in bed;Bed alarm in place;Nurse notified;Call light within reach  Restraints  Initially in place: No         Patient Diagnosis(es): The primary encounter diagnosis was Acute deep vein thrombosis (DVT) of right peroneal vein ([address] Diagnoses of General weakness, Fall in home, initial encounter, Hip pain, right, Malignant neoplasm of esophagus, unspecified location ([address] and Acute deep vein thrombosis (DVT) of tibial vein of right lower extremity ([address] were also pertinent to this visit. has a past medical history of Allergic rhinitis, Anxiety state, CAD (coronary artery disease), Cataract, Chronic ischemic heart disease, Esophageal reflux, Essential hypertension, Glaucoma, Influenza, Insomnia, and Other and unspecified hyperlipidemia. has a past surgical history that includes Coronary angioplasty with stent; eye surgery; hernia repair; Colonoscopy; ERCP (01/16/2018); Cholecystectomy, laparoscopic (01/17/2018); Upper gastrointestinal endoscopy (N/A, 2/2/2022); Colonoscopy (N/A, 2/2/2022); and IR PORT PLACEMENT > 5 YEARS (2/15/2022). Restrictions  Restrictions/Precautions  Restrictions/Precautions: Fall Risk,Up as Tolerated (high fall risk)  Required Braces or Orthoses?: No  Position Activity Restriction  Other position/activity restrictions: Bethany Ibarra is a 78 y.o. male in February with acute GI bleed required 1 unit of PRBCs had a EGD and colonoscopy which showed small colon polyps and extensive esophageal tumors found to be adenocarcinoma of the.  Patient has been having chemotherapy for this today patient felt that his wife called EMS patient had 3 falls over the last couple weeks patient has become extremely weak poor p.o. intake no chest pain no lightheadedness or dizziness.   Patient had increased right lower extremity swelling compared to left extremity had ultrasound performed which showed right lower extremity DVT  Subjective   General  Chart Reviewed: Yes  Patient assessed for rehabilitation services?: Yes  Additional Pertinent Hx: adenocarcinoma  Response to previous treatment: Patient with no complaints from previous session  Family / Caregiver Present: Yes (wife)  Diagnosis: DVT  Subjective  Subjective: Pt supine in bed upon arrival with RN initiating pericare; agreeable to treatment and requesting additional toileting. General Comment  Comments: Patient left in bed with all needs met. patient declining to stay up in chair due to discomfort on bottom  Vital Signs  Patient Currently in Pain: Denies   Orientation  Orientation  Overall Orientation Status: Within Functional Limits  Objective    ADL  Grooming: Contact guard assistance (CGA for balance while washing hands in stance at sink)  UE Dressing: Minimal assistance (gown change)  LE Dressing: Moderate assistance (don brief)  Toileting: Moderate assistance (Initially incontinent of bowels in brief. Voided bowel and bladder at toilet with SBA to wipe with toilet paper seated on toilet and mod A for thoroughness with wipes in stance.)  Additional Comments: Pt primarily limited by deficits in balance and endurance on this date. Balance  Sitting Balance: Supervision  Standing Balance: Contact guard assistance  Standing Balance  Time: ~6 min total  Activity: ADLs, transfers, and functional mobility  Comment: RW  Functional Mobility  Functional - Mobility Device: Rolling Walker  Activity: To/from bathroom  Assist Level: Contact guard assistance  Functional Mobility Comments: 1 minor, self-corrected LOB when ambulating  Toilet Transfers  Toilet - Technique: Ambulating; To left  Equipment Used: Standard toilet  Toilet Transfer: Contact guard assistance  Toilet Transfers Comments: L grab bar and increased time due to lower surface  Bed mobility  Rolling to Left: Stand by assistance  Supine to Sit: Stand by assistance  Sit to Supine: Stand by assistance  Scooting: Stand by assistance  Comment: HOB slightly elevated  Transfers  Stand Step Transfers: Contact guard assistance  Sit to stand: Contact guard assistance  Stand to sit: Contact guard assistance  Transfer Comments: EOB>ambulating>toilet>ambulating>EOB                       Cognition  Overall Cognitive Status:  (functional, but with mild deficits below)  Following Commands:  Follows one step commands consistently  Attention Span: Difficulty dividing attention  Insights: Decreased awareness of deficits  Initiation: Requires cues for some                                         Plan   Plan  Times per week: 3-5x/wk  Times per day: Daily  Current Treatment Recommendations: Strengthening,Self-Care / ADL,Safety Education & Training,Functional Mobility Training,Patient/Caregiver Education & Training  G-Code     OutComes Score                                                  AM-PAC Score        AM-PAC Inpatient Daily Activity Raw Score: 16 (04/19/22 1437)  AM-PAC Inpatient ADL T-Scale Score : 35.96 (04/19/22 1437)  ADL Inpatient CMS 0-100% Score: 53.32 (04/19/22 1437)  ADL Inpatient CMS G-Code Modifier : CK (04/19/22 1437)    Goals  Short term goals  Time Frame for Short term goals: Discharge  Short term goal 1: Functional transfers SBA- CGA 4/19  Short term goal 2: Toileting SBA- mod A 4/19  Short term goal 3: UB ADLs SBA- min A 4/19  Short term goal 4: LB ADLs SBA- mod A 4/19  Short term goal 5: Grooming standing at sink for 5 mins with RW and no LOB- 2 min 4/19  Long term goals  Time Frame for Long term goals : STG=LTG       Therapy Time   Individual Concurrent Group Co-treatment   Time In 1350         Time Out 1429         Minutes 39         Timed Code Treatment Minutes: [address] [place] [id number]

## 2022-04-19 NOTE — PROGRESS NOTES
Physical Therapy  Facility/Department: [address] ONCOLOGY  Daily Treatment Note  NAME: Breezy Yepez  : 1940  MRN: [id number]    Date of Service: 2022    Discharge Recommendations:Sandro Randall scored a 16/24 on the AM-PAC short mobility form. Current research shows that an AM-PAC score of 17 or less is typically not associated with a discharge to the patient's home setting. Based on the patient's AM-PAC score and their current functional mobility deficits, it is recommended that the patient have 5-7 sessions per week of Physical Therapy at d/c to increase the patient's independence. At this time, this patient demonstrates the endurance, and/or tolerance for 3 hours of therapy each day, with a treatment frequency of 5-7x/wk. Please see assessment section for further patient specific details. If patient discharges prior to next session this note will serve as a discharge summary. Please see below for the latest assessment towards goals. 24 hour supervision or assist,5-7 sessions per week   PT Equipment Recommendations  Equipment Needed: No  Other: Defer to next level of care. Assessment   Body structures, Functions, Activity limitations: Decreased functional mobility ; Decreased posture;Decreased endurance;Decreased ROM; Decreased strength;Decreased balance;Decreased safe awareness;Decreased vision/visual deficit  Assessment: Pt SBA for bed mob, CGA for transfers and amb with RW. CGA static standing balance. Pt fatigued at end of treatment. pt continues to require increased time to complete safe functional mobility and tasks. pt presents below baseline requiring use of RW and CGA assist for ambulation this session. pt will benefit from continued skilled therapy services to facilitate return to PLOF and reduce fall risk  Treatment Diagnosis: Impaired functional mobility  Prognosis: Good  PT Education: Goals;PT Role;Plan of Care;Gait Training;Family Education;General Safety; Functional Mobility Training;Energy Conservation;Transfer Training  Patient Education: transfer training, Pt verbalized understanding, needs reinforcement. REQUIRES PT FOLLOW UP: Yes  Activity Tolerance  Activity Tolerance: Patient limited by fatigue;Patient limited by endurance  Activity Tolerance: pt fatigued quickly with all OOB mobility and functional tasks     Patient Diagnosis(es): The primary encounter diagnosis was Acute deep vein thrombosis (DVT) of right peroneal vein ([address] Diagnoses of General weakness, Fall in home, initial encounter, Hip pain, right, Malignant neoplasm of esophagus, unspecified location ([address] and Acute deep vein thrombosis (DVT) of tibial vein of right lower extremity ([address] were also pertinent to this visit. has a past medical history of Allergic rhinitis, Anxiety state, CAD (coronary artery disease), Cataract, Chronic ischemic heart disease, Esophageal reflux, Essential hypertension, Glaucoma, Influenza, Insomnia, and Other and unspecified hyperlipidemia. has a past surgical history that includes Coronary angioplasty with stent; eye surgery; hernia repair; Colonoscopy; ERCP (01/16/2018); Cholecystectomy, laparoscopic (01/17/2018); Upper gastrointestinal endoscopy (N/A, 2/2/2022); Colonoscopy (N/A, 2/2/2022); and IR PORT PLACEMENT > 5 YEARS (2/15/2022).     Restrictions  Restrictions/Precautions  Restrictions/Precautions: Fall Risk,Up as Tolerated (high fall risk)  Required Braces or Orthoses?: No  Position Activity Restriction  Other position/activity restrictions: Boone Alex is a 78 y.o. male in February with acute GI bleed required 1 unit of PRBCs had a EGD and colonoscopy which showed small colon polyps and extensive esophageal tumors found to be adenocarcinoma of the.  Patient has been having chemotherapy for this today patient felt that his wife called EMS patient had 3 falls over the last couple weeks patient has become extremely weak poor p.o. intake no chest pain no lightheadedness or dizziness. Patient had increased right lower extremity swelling compared to left extremity had ultrasound performed which showed right lower extremity DVT  Subjective   General  Chart Reviewed: Yes  Response To Previous Treatment: Patient with no complaints from previous session. Family / Caregiver Present: No  Subjective  Subjective: Pt denies pain, Agreeable to working with PT. General Comment  Comments: Pt supine in bed upon arrival. Pt reporting having BM in bed and needing assist for cleaning up. Pain Screening  Patient Currently in Pain: Denies  Vital Signs  Patient Currently in Pain: Denies       Orientation  Orientation  Overall Orientation Status: Within Functional Limits  Cognition      Objective   Bed mobility  Rolling to Left: Stand by assistance  Rolling to Right: Stand by assistance  Supine to Sit: Stand by assistance  Scooting: Stand by assistance  Comment: Pt rolling in bed for zora care and brief change. Pt continuing to have BM and needing cleaned. Nursing in to observe buttock wound and apply meplex pad. Brief applied and pt sat EOB for 5 min with good sitting balance. Transfers  Sit to Stand: Contact guard assistance (pt transfered from toilet and chair, VCs needed for hand placement.)  Stand to sit: Contact guard assistance  Ambulation  Ambulation?: Yes  Ambulation 1  Surface: level tile  Device: Rolling Walker  Assistance: Contact guard assistance  Quality of Gait: flexed trunk, VCs for upright standing posture and to keep RW close. R side lean in static standing. Gait Deviations: Slow Sasha  Distance: Pt amb with RW and CGA for 12 ft x 2, then 110 ft. Comments: Pt amb to bathroom and continued to have BM. Pt assisted donning pants and he performed his own zora hygiene. Pt then stood at sink for 7 min while doing ADLs with CGA standing balance. Pt returned to chair for seated rest before amb in hallway.  Pt left seated in chair at end of session. Stairs/Curb  Stairs?: No     Balance  Posture: Fair (rounded shoulders)  Sitting - Static: Good  Sitting - Dynamic: Good;-  Standing - Static: Fair (lean to R side when standing at sink, CGA to correct)  Standing - Dynamic: Fair; - (with RW)  Comments: sitting balance on toilet with CGA/SBA to move outside of [place]. standing balance at sink with UE support and CGA. assist to wali pants            Comment: toileting and hygiene in bed and bathroom. Standing ADLs at sink. Positioning in chair for comfort. G-Code     OutComes Score                                                     AM-PAC Score  AM-PAC Inpatient Mobility Raw Score : 16 (04/19/22 1027)  AM-PAC Inpatient T-Scale Score : 40.78 (04/19/22 1027)  Mobility Inpatient CMS 0-100% Score: 54.16 (04/19/22 1027)  Mobility Inpatient CMS G-Code Modifier : CK (04/19/22 1027)          Goals  Short term goals  Time Frame for Short term goals: to be met by DC (no goals met in full this date)  Short term goal 1: Pt will transfer supine to/from sit with supervision  Short term goal 2: Pt will transfer sit to/from stand with supervision  Short term goal 3: Pt will amb 76' with RW and supervision  Long term goals  Time Frame for Long term goals : LTG=STG  Patient Goals   Patient goals : To get stronger    Plan    Plan  Times per week: 3-5  Times per day: Daily  Current Treatment Recommendations: Tate Sheikh Re-education,Patient/Caregiver Education & Training,ROM,Balance Training,Gait Training,Safety Education & Training,Stair training,Functional Mobility Training  Safety Devices  Type of devices:  All fall risk precautions in place,Call light within reach,Chair alarm in place,Telesitter in use,Left in chair,Nurse notified,Gait belt,Patient at risk for falls  Restraints  Initially in place: No     Therapy Time   Individual Concurrent Group Co-treatment   Time In 0903         Time Out 1004 Minutes 61         Timed Code Treatment Minutes: [address] [facility] [facility], [id number]

## 2022-04-20 LAB
ANION GAP SERPL CALCULATED.3IONS-SCNC: 8 MMOL/L (ref 3–16)
BASOPHILS ABSOLUTE: 0 K/UL (ref 0–0.2)
BASOPHILS RELATIVE PERCENT: 0.5 %
BUN BLDV-MCNC: 17 MG/DL (ref 7–20)
CALCIUM SERPL-MCNC: 7.4 MG/DL (ref 8.3–10.6)
CHLORIDE BLD-SCNC: 106 MMOL/L (ref 99–110)
CO2: 19 MMOL/L (ref 21–32)
CREAT SERPL-MCNC: 0.7 MG/DL (ref 0.8–1.3)
EOSINOPHILS ABSOLUTE: 0 K/UL (ref 0–0.6)
EOSINOPHILS RELATIVE PERCENT: 0.9 %
GFR AFRICAN AMERICAN: >60
GFR NON-AFRICAN AMERICAN: >60
GLUCOSE BLD-MCNC: 96 MG/DL (ref 70–99)
HCT VFR BLD CALC: 34.5 % (ref 40.5–52.5)
HEMOGLOBIN: 11.5 G/DL (ref 13.5–17.5)
LYMPHOCYTES ABSOLUTE: 0.9 K/UL (ref 1–5.1)
LYMPHOCYTES RELATIVE PERCENT: 26.8 %
MCH RBC QN AUTO: 30.8 PG (ref 26–34)
MCHC RBC AUTO-ENTMCNC: 33.2 G/DL (ref 31–36)
MCV RBC AUTO: 92.7 FL (ref 80–100)
MONOCYTES ABSOLUTE: 0.1 K/UL (ref 0–1.3)
MONOCYTES RELATIVE PERCENT: 2 %
NEUTROPHILS ABSOLUTE: 2.4 K/UL (ref 1.7–7.7)
NEUTROPHILS RELATIVE PERCENT: 69.8 %
PDW BLD-RTO: 17.6 % (ref 12.4–15.4)
PLATELET # BLD: 179 K/UL (ref 135–450)
PMV BLD AUTO: 7.1 FL (ref 5–10.5)
POTASSIUM REFLEX MAGNESIUM: 3.9 MMOL/L (ref 3.5–5.1)
RBC # BLD: 3.72 M/UL (ref 4.2–5.9)
SODIUM BLD-SCNC: 133 MMOL/L (ref 136–145)
WBC # BLD: 3.5 K/UL (ref 4–11)

## 2022-04-20 PROCEDURE — 1200000000 HC SEMI PRIVATE

## 2022-04-20 PROCEDURE — 2580000003 HC RX 258: Performed by: INTERNAL MEDICINE

## 2022-04-20 PROCEDURE — 85025 COMPLETE CBC W/AUTO DIFF WBC: CPT

## 2022-04-20 PROCEDURE — 80048 BASIC METABOLIC PNL TOTAL CA: CPT

## 2022-04-20 PROCEDURE — 97530 THERAPEUTIC ACTIVITIES: CPT

## 2022-04-20 PROCEDURE — 6370000000 HC RX 637 (ALT 250 FOR IP): Performed by: INTERNAL MEDICINE

## 2022-04-20 PROCEDURE — 6370000000 HC RX 637 (ALT 250 FOR IP): Performed by: STUDENT IN AN ORGANIZED HEALTH CARE EDUCATION/TRAINING PROGRAM

## 2022-04-20 PROCEDURE — 97116 GAIT TRAINING THERAPY: CPT

## 2022-04-20 RX ADMIN — APIXABAN 10 MG: 5 TABLET, FILM COATED ORAL at 07:55

## 2022-04-20 RX ADMIN — SODIUM CHLORIDE, PRESERVATIVE FREE 10 ML: 5 INJECTION INTRAVENOUS at 20:06

## 2022-04-20 RX ADMIN — ATORVASTATIN CALCIUM 10 MG: 10 TABLET, FILM COATED ORAL at 20:06

## 2022-04-20 RX ADMIN — OXYCODONE 10 MG: 5 TABLET ORAL at 20:05

## 2022-04-20 RX ADMIN — OXYCODONE 10 MG: 5 TABLET ORAL at 11:30

## 2022-04-20 RX ADMIN — LISINOPRIL 5 MG: 5 TABLET ORAL at 07:55

## 2022-04-20 RX ADMIN — APIXABAN 10 MG: 5 TABLET, FILM COATED ORAL at 21:20

## 2022-04-20 RX ADMIN — SODIUM CHLORIDE, PRESERVATIVE FREE 10 ML: 5 INJECTION INTRAVENOUS at 08:00

## 2022-04-20 RX ADMIN — GLYCERIN, PETROLATUM, PHENYLEPHRINE HCL, PRAMOXINE HCL 1 TUBE: 144; 2.5; 10; 15 CREAM TOPICAL at 07:56

## 2022-04-20 RX ADMIN — GLYCERIN, PETROLATUM, PHENYLEPHRINE HCL, PRAMOXINE HCL 1 TUBE: 144; 2.5; 10; 15 CREAM TOPICAL at 20:08

## 2022-04-20 RX ADMIN — ATENOLOL 50 MG: 50 TABLET ORAL at 07:55

## 2022-04-20 ASSESSMENT — PAIN SCALES - WONG BAKER: WONGBAKER_NUMERICALRESPONSE: 0

## 2022-04-20 ASSESSMENT — PAIN DESCRIPTION - DESCRIPTORS
DESCRIPTORS: DISCOMFORT
DESCRIPTORS: BURNING;SHOOTING

## 2022-04-20 ASSESSMENT — PAIN SCALES - GENERAL
PAINLEVEL_OUTOF10: 0
PAINLEVEL_OUTOF10: 7
PAINLEVEL_OUTOF10: 8

## 2022-04-20 ASSESSMENT — PAIN DESCRIPTION - LOCATION
LOCATION: EYE
LOCATION: BACK

## 2022-04-20 ASSESSMENT — PAIN DESCRIPTION - ORIENTATION
ORIENTATION: RIGHT;LEFT
ORIENTATION: MID
ORIENTATION: RIGHT;LEFT

## 2022-04-20 NOTE — CARE COORDINATION
IMM and subsequent DND given per CM at 1313. Patient appealed discharge. All documentation provided to [facility] for review.

## 2022-04-20 NOTE — CARE COORDINATION
CM wrote down all information on piece of paper for spouse and pt that we have discussed: home care agency, [facility] information, private pay caregiver agencies provided, and wrote down COA referral made d/t spouse unable to remember information. Spouse wanted CM to refer to another snf and CM explained unfortunately that if insurance denied one snf they will deny another it is not the facility it is the level of care. CM explained this multiple times. Pt and spouse filed [facility] appeal and state they were given case number: [id number]. CM notified CM office.      Bartolo Mack, RN, BSN  [phone]

## 2022-04-20 NOTE — CARE COORDINATION
Message received from Dell with [facility] stating pt was denied snf and peer to peer requested. MD has to complete peer to peer by 9am deadline tomorrow. MD will call [phone] option 5 and will need pt name, , and ID number which is [id number]. MIREYA spent approximately 35 minutes with pt and spouse explaining this and option of home with home care if denied. CM kept having to repeat information to spouse as she appears to have difficulty understanding or remembering. Cm explained to both of them he scored well with therapy and walked 110 feet with his walker and ambulating that far insurance denied. Wife concerned hc only there weekly and \"what if he has an accident how do I clean him. ?\" CM explained that there is no 24 hour care unless private pay and discussed cost. Cm asked if pt had accidents before admission and she stated yes. Mireya explained unfortunately cannot keep pt's in the hospital for incontinence. Pt and spouse want MD to do peer to peer and state they will file an appeal from [facility] letter given. They want to see MD.    CM told pt and spouse that they have right to file appeal, they would need to call the number on IMM form and we would be contacted. MIREYA explained if Inocencia Humphreys agrees with discharge he would be discharged home. MIREYA was able to obtain hc services with [employer] [facility]. CM provided them with Sonya's card with [facility]. CM provided [facility] information pamphlet which is a free service in the community to assists pt's and family's. CM encouraged them to call. MIREYA sent perfect serve message to Dr Peace Pickard about all of the above. COA referral made for any services that could be received.        Adolfo Shah, RN, BSN  [phone]

## 2022-04-20 NOTE — CARE COORDINATION
CM checking on pre cert status. Pt has tentative transport to 5pm for [place]. CM left vm for Tiki [phone] to see if there is an update on auth.     Dominga Falcon RN, BSN  [phone]

## 2022-04-20 NOTE — CARE COORDINATION
CM called [facility] transport [phone] and cancelled transport to [facility] that was scheduled.     Jarad Doyle, RN, BSN  [phone]

## 2022-04-20 NOTE — PROGRESS NOTES
[facility]    4/20/2022 10:41 AM        Name: Amber Meadows Admitted: 4/12/2022  Primary Care Provider: Rachael Sofia MD (Tel: [phone])        Subjective:    Patient lying in bed no shortness of breath fevers or chills no nausaea or vomitting  Diarrhea improved    Reviewed interval ancillary notes    Current Medications  apixaban (ELIQUIS) tablet 10 mg, BID   Followed by  Marylu Lofton ON 4/21/2022] apixaban (ELIQUIS) tablet 5 mg, BID  pramox-PE-glycerin-petrolatum cream, BID  oxyCODONE (ROXICODONE) immediate release tablet 10 mg, Q6H PRN  loperamide (IMODIUM) capsule 2 mg, 4x Daily PRN  atenolol (TENORMIN) tablet 50 mg, Daily  lisinopril (PRINIVIL;ZESTRIL) tablet 5 mg, Daily  atorvastatin (LIPITOR) tablet 10 mg, Nightly  sodium chloride flush 0.9 % injection 5-40 mL, 2 times per day  sodium chloride flush 0.9 % injection 5-40 mL, PRN  0.9 % sodium chloride infusion, PRN  ondansetron (ZOFRAN-ODT) disintegrating tablet 4 mg, Q8H PRN   Or  ondansetron (ZOFRAN) injection 4 mg, Q6H PRN  polyethylene glycol (GLYCOLAX) packet 17 g, Daily PRN  acetaminophen (TYLENOL) tablet 650 mg, Q6H PRN   Or  acetaminophen (TYLENOL) suppository 650 mg, Q6H PRN        Objective:  /89   Pulse 81   Temp 97.3 °F (36.3 °C) (Oral)   Resp 18   Ht 5' 10\" (1.778 m)   Wt 124 lb 11.2 oz (56.6 kg)   SpO2 94%   BMI 17.89 kg/m²     Intake/Output Summary (Last 24 hours) at 4/20/2022 1041  Last data filed at 4/19/2022 1549  Gross per 24 hour   Intake --   Output 200 ml   Net -200 ml      Wt Readings from Last 3 Encounters:   04/15/22 124 lb 11.2 oz (56.6 kg)   02/15/22 121 lb 4.1 oz (55 kg)   02/02/22 156 lb (70.8 kg)     General appearance:  Appears comfortable.  AAOx3  HEENT: atraumatic, Pupils equal, muscous membranes moist, no masses appreciated  Cardiovascular: Regular rate and rhythm no murmurs appreciated  Respiratory: CTAB no wheezing  Gastrointestinal: Abdomen soft, non-tender, BS+  EXT: RLE>LLE swelling  Neurology: no gross focal deficts  Psychiatry: Appropriate affect. Not agitated  Skin: Warm, dry, no rashes appreciated    Labs and Tests:  CBC:   Recent Labs     04/18/22  0430 04/19/22  0620 04/20/22  0530   WBC 4.8 4.5 3.5*   HGB 11.7* 11.9* 11.5*    191 179     BMP:    Recent Labs     04/18/22  0430 04/19/22  0620 04/20/22  0530    134* 133*   K 4.0 3.9 3.9    105 106   CO2 20* 20* 19*   BUN 19 16 17   CREATININE 0.7* 0.6* 0.7*   GLUCOSE 118* 110* 96     Hepatic:   No results for input(s): AST, ALT, ALB, BILITOT, ALKPHOS in the last 72 hours. VL Extremity Venous Right         CT Head WO Contrast   Final Result   No acute intracranial abnormality. Moderate senescent changes with parenchymal volume loss and chronic small   vessel ischemic changes. XR HIP 2-3 VW W PELVIS RIGHT   Final Result   1. No acute abnormality. Recent imaging reviewed    Problem List  Principal Problem:    DVT femoral (deep venous thrombosis) with thrombophlebitis, bilateral (HCC)  Active Problems:    Severe malnutrition (HCC)  Resolved Problems:    * No resolved hospital problems.  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

## 2022-04-20 NOTE — PROGRESS NOTES
[facility]    4/20/2022 10:42 AM        Name: Ania Ruiz . Admitted: 4/12/2022  Primary Care Provider: Tamia Jimenez MD (Tel: [phone])        Subjective:    Patient lying in bed no shortness of breath fevers or chills no nausaea or vomitting  Diarrhea improved    Reviewed interval ancillary notes    Current Medications  apixaban (ELIQUIS) tablet 10 mg, BID   Followed by  Bronwyn Berumen ON 4/21/2022] apixaban (ELIQUIS) tablet 5 mg, BID  pramox-PE-glycerin-petrolatum cream, BID  oxyCODONE (ROXICODONE) immediate release tablet 10 mg, Q6H PRN  loperamide (IMODIUM) capsule 2 mg, 4x Daily PRN  atenolol (TENORMIN) tablet 50 mg, Daily  lisinopril (PRINIVIL;ZESTRIL) tablet 5 mg, Daily  atorvastatin (LIPITOR) tablet 10 mg, Nightly  sodium chloride flush 0.9 % injection 5-40 mL, 2 times per day  sodium chloride flush 0.9 % injection 5-40 mL, PRN  0.9 % sodium chloride infusion, PRN  ondansetron (ZOFRAN-ODT) disintegrating tablet 4 mg, Q8H PRN   Or  ondansetron (ZOFRAN) injection 4 mg, Q6H PRN  polyethylene glycol (GLYCOLAX) packet 17 g, Daily PRN  acetaminophen (TYLENOL) tablet 650 mg, Q6H PRN   Or  acetaminophen (TYLENOL) suppository 650 mg, Q6H PRN        Objective:  /89   Pulse 81   Temp 97.3 °F (36.3 °C) (Oral)   Resp 18   Ht 5' 10\" (1.778 m)   Wt 124 lb 11.2 oz (56.6 kg)   SpO2 94%   BMI 17.89 kg/m²     Intake/Output Summary (Last 24 hours) at 4/20/2022 1042  Last data filed at 4/19/2022 1549  Gross per 24 hour   Intake --   Output 200 ml   Net -200 ml      Wt Readings from Last 3 Encounters:   04/15/22 124 lb 11.2 oz (56.6 kg)   02/15/22 121 lb 4.1 oz (55 kg)   02/02/22 156 lb (70.8 kg)     General appearance:  Appears comfortable.  AAOx3  HEENT: atraumatic, Pupils equal, muscous membranes moist, no masses appreciated  Cardiovascular: Regular rate and rhythm no murmurs appreciated  Respiratory: CTAB no wheezing  Gastrointestinal: Abdomen soft, non-tender, BS+  EXT: RLE>LLE swelling  Neurology: no gross focal deficts  Psychiatry: Appropriate affect. Not agitated  Skin: Warm, dry, no rashes appreciated    Labs and Tests:  CBC:   Recent Labs     04/18/22  0430 04/19/22  0620 04/20/22  0530   WBC 4.8 4.5 3.5*   HGB 11.7* 11.9* 11.5*    191 179     BMP:    Recent Labs     04/18/22  0430 04/19/22  0620 04/20/22  0530    134* 133*   K 4.0 3.9 3.9    105 106   CO2 20* 20* 19*   BUN 19 16 17   CREATININE 0.7* 0.6* 0.7*   GLUCOSE 118* 110* 96     Hepatic:   No results for input(s): AST, ALT, ALB, BILITOT, ALKPHOS in the last 72 hours. VL Extremity Venous Right         CT Head WO Contrast   Final Result   No acute intracranial abnormality. Moderate senescent changes with parenchymal volume loss and chronic small   vessel ischemic changes. XR HIP 2-3 VW W PELVIS RIGHT   Final Result   1. No acute abnormality. Recent imaging reviewed    Problem List  Principal Problem:    DVT femoral (deep venous thrombosis) with thrombophlebitis, bilateral (HCC)  Active Problems:    Severe malnutrition (HCC)  Resolved Problems:    * No resolved hospital problems.  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

## 2022-04-20 NOTE — PROGRESS NOTES
Physical Therapy  Facility/Department: [address] ONCOLOGY  Rehabilitation Physical Therapy Treatment Note    NAME: Ila Barnes  : 1940 (78 y.o.)  MRN: [id number]  CODE STATUS: DNR-CCA    Date of Service: 22     Discharge Recommendations:  Ila Barnes scored a 16/24 on the AM-PAC short mobility form. Current research shows that an AM-PAC score of 17 or less is typically not associated with a discharge to the patient's home setting. Based on the patient's AM-PAC score and their current functional mobility deficits, it is recommended that the patient have 3-5 sessions per week of Physical Therapy at d/c to increase the patient's independence. Please see assessment section for further patient specific details. If patient discharges prior to next session this note will serve as a discharge summary. Please see below for the latest assessment towards goals. DME: none    Restrictions:  Restrictions/Precautions: Fall Risk;Up as Tolerated  Position Activity Restriction  Other position/activity restrictions: Ila Barnes is a 78 y.o. male in February with acute GI bleed required 1 unit of PRBCs had a EGD and colonoscopy which showed small colon polyps and extensive esophageal tumors found to be adenocarcinoma of the.  Patient has been having chemotherapy for this today patient felt that his wife called EMS patient had 3 falls over the last couple weeks patient has become extremely weak poor p.o. intake no chest pain no lightheadedness or dizziness. Patient had increased right lower extremity swelling compared to left extremity had ultrasound performed which showed right lower extremity DVT. SUBJECTIVE  Subjective  Subjective: Denied pain. Patient agreeable to therapy. Found supine in bed upon arrival with alarm on.          OBJECTIVE       Functional Mobility  Bridging  Assistance Level: Supervision  Roll Left  Assistance Level: Supervision (incontinent of small amount of stool)  Roll Right  Assistance Level: Supervision  Supine to Sit  Assistance Level: Stand by assist  Scooting  Assistance Level: Stand by assist  Balance  Sitting Balance: Stand by assistance  Standing Balance: Contact guard assistance  Sit to Stand  Assistance Level: Minimal assistance  Stand to Sit  Assistance Level: Contact guard assist  Bed To/From Chair  Assistance Level: Minimal assistance (chair <>BSC)  Skilled Clinical Factors: able to perform clothing management and zora-care without physical assistance. SpO2 88% on 11L O2 following transfer. Recovered with short seated rest break. Environmental Mobility  Ambulation  Surface: Level surface  Device: Rolling walker  Distance: 20'  Activity Comments: reported significant fatigue with ambulation - requested to return to bed  Assistance Level: Contact guard assist;Minimal assistance (min A for turning - poor walker safety)  Gait Deviations: Slow denise; Unsteady gait  Stairs  Stair Height:  (unable to peform due to fatigue and safety concerns)      ASSESSMENT/PROGRESS TOWARDS GOALS       Assessment  Assessment: Patient not at baseline function. Patient with slight decline from previous session. Patient needed min A for transfers and only able to tolerate short distance ambulation. Patient has multiple steps to enter home and spouse cannot provide any physical assistance. Strongly recommend non-home discharge for safety. Patient presents at significant risk for falls. Activity Tolerance: Patient limited by fatigue;Patient limited by endurance  Discharge Recommendations:  (3-5)  Treatment Diagnosis: generalized weakness, decreased endurance  Goals  Short Term Goals  Short term goal 1: Pt will transfer supine to/from sit with supervision  Short term goal 2: Pt will transfer sit to/from stand with supervision  Short term goal 3: Pt will amb 76' with RW and supervision  Short term goal 4: Navigate up/down 4 steps with rail and min A  No goals met this treatment.     PLAN OF CARE/SAFETY  Plan  Plan: 3-5 times per week  Current Treatment Recommendations: Strengthening;Balance training;Functional mobility training;Transfer training;Gait training; Endurance training;Stair training; Safety education & training;Home exercise program  Safety Devices  Type of Devices: All fall risk precautions in place; Bed alarm in place;Call light within reach; Patient at risk for falls;Nurse notified; Left in bed  Restraints  Restraints Initially in Place: No      Therapy Time   Individual Concurrent Group Co-treatment   Time In 6746         Time Out 1530         Minutes 53           Timed Code Treatment Minutes: 3928 Yuriy, PT, 04/20/22 at 4:34 PM     Thanks, Laura Luis, PT, DPT [id number]      Thanks, Laura Luis, [address] [place] [address], DPT [id number]

## 2022-04-20 NOTE — CARE COORDINATION
CM provided pt and spouse with Detailed notice of discharge letter. They state still waiting to see MD, MIREYA informed them that MD is aware that they want to speak to him.       Svetlana Laura RN, BSN  [phone]

## 2022-04-21 LAB
ANION GAP SERPL CALCULATED.3IONS-SCNC: 9 MMOL/L (ref 3–16)
BASOPHILS ABSOLUTE: 0 K/UL (ref 0–0.2)
BASOPHILS RELATIVE PERCENT: 0.8 %
BUN BLDV-MCNC: 17 MG/DL (ref 7–20)
CALCIUM SERPL-MCNC: 7.9 MG/DL (ref 8.3–10.6)
CHLORIDE BLD-SCNC: 103 MMOL/L (ref 99–110)
CO2: 21 MMOL/L (ref 21–32)
CREAT SERPL-MCNC: 0.7 MG/DL (ref 0.8–1.3)
EOSINOPHILS ABSOLUTE: 0 K/UL (ref 0–0.6)
EOSINOPHILS RELATIVE PERCENT: 0.7 %
GFR AFRICAN AMERICAN: >60
GFR NON-AFRICAN AMERICAN: >60
GLUCOSE BLD-MCNC: 99 MG/DL (ref 70–99)
HCT VFR BLD CALC: 35.1 % (ref 40.5–52.5)
HEMOGLOBIN: 11.6 G/DL (ref 13.5–17.5)
LYMPHOCYTES ABSOLUTE: 0.9 K/UL (ref 1–5.1)
LYMPHOCYTES RELATIVE PERCENT: 25.5 %
MCH RBC QN AUTO: 30.7 PG (ref 26–34)
MCHC RBC AUTO-ENTMCNC: 33 G/DL (ref 31–36)
MCV RBC AUTO: 92.9 FL (ref 80–100)
MONOCYTES ABSOLUTE: 0.1 K/UL (ref 0–1.3)
MONOCYTES RELATIVE PERCENT: 2.4 %
NEUTROPHILS ABSOLUTE: 2.6 K/UL (ref 1.7–7.7)
NEUTROPHILS RELATIVE PERCENT: 70.6 %
PDW BLD-RTO: 18.1 % (ref 12.4–15.4)
PLATELET # BLD: 167 K/UL (ref 135–450)
PMV BLD AUTO: 6.8 FL (ref 5–10.5)
POTASSIUM REFLEX MAGNESIUM: 3.9 MMOL/L (ref 3.5–5.1)
RBC # BLD: 3.78 M/UL (ref 4.2–5.9)
SODIUM BLD-SCNC: 133 MMOL/L (ref 136–145)
WBC # BLD: 3.7 K/UL (ref 4–11)

## 2022-04-21 PROCEDURE — 97530 THERAPEUTIC ACTIVITIES: CPT

## 2022-04-21 PROCEDURE — 6370000000 HC RX 637 (ALT 250 FOR IP): Performed by: STUDENT IN AN ORGANIZED HEALTH CARE EDUCATION/TRAINING PROGRAM

## 2022-04-21 PROCEDURE — 80048 BASIC METABOLIC PNL TOTAL CA: CPT

## 2022-04-21 PROCEDURE — 85025 COMPLETE CBC W/AUTO DIFF WBC: CPT

## 2022-04-21 PROCEDURE — 6370000000 HC RX 637 (ALT 250 FOR IP): Performed by: INTERNAL MEDICINE

## 2022-04-21 PROCEDURE — 1200000000 HC SEMI PRIVATE

## 2022-04-21 PROCEDURE — 2580000003 HC RX 258: Performed by: INTERNAL MEDICINE

## 2022-04-21 PROCEDURE — 97116 GAIT TRAINING THERAPY: CPT

## 2022-04-21 PROCEDURE — 94760 N-INVAS EAR/PLS OXIMETRY 1: CPT

## 2022-04-21 PROCEDURE — 97535 SELF CARE MNGMENT TRAINING: CPT

## 2022-04-21 RX ADMIN — GLYCERIN, PETROLATUM, PHENYLEPHRINE HCL, PRAMOXINE HCL 1 EACH: 144; 2.5; 10; 15 CREAM TOPICAL at 08:33

## 2022-04-21 RX ADMIN — GLYCERIN, PETROLATUM, PHENYLEPHRINE HCL, PRAMOXINE HCL 1 TUBE: 144; 2.5; 10; 15 CREAM TOPICAL at 20:05

## 2022-04-21 RX ADMIN — ATORVASTATIN CALCIUM 10 MG: 10 TABLET, FILM COATED ORAL at 20:03

## 2022-04-21 RX ADMIN — ATENOLOL 50 MG: 50 TABLET ORAL at 08:31

## 2022-04-21 RX ADMIN — APIXABAN 5 MG: 5 TABLET, FILM COATED ORAL at 08:31

## 2022-04-21 RX ADMIN — SODIUM CHLORIDE, PRESERVATIVE FREE 10 ML: 5 INJECTION INTRAVENOUS at 08:31

## 2022-04-21 RX ADMIN — SODIUM CHLORIDE, PRESERVATIVE FREE 10 ML: 5 INJECTION INTRAVENOUS at 20:03

## 2022-04-21 RX ADMIN — APIXABAN 5 MG: 5 TABLET, FILM COATED ORAL at 20:03

## 2022-04-21 RX ADMIN — OXYCODONE 10 MG: 5 TABLET ORAL at 17:39

## 2022-04-21 RX ADMIN — LISINOPRIL 5 MG: 5 TABLET ORAL at 08:31

## 2022-04-21 ASSESSMENT — PAIN SCALES - WONG BAKER
WONGBAKER_NUMERICALRESPONSE: 0

## 2022-04-21 ASSESSMENT — PAIN SCALES - GENERAL
PAINLEVEL_OUTOF10: 0
PAINLEVEL_OUTOF10: 7

## 2022-04-21 ASSESSMENT — PAIN DESCRIPTION - LOCATION: LOCATION: EYE

## 2022-04-21 ASSESSMENT — PAIN DESCRIPTION - DESCRIPTORS: DESCRIPTORS: ACHING

## 2022-04-21 NOTE — CARE COORDINATION
MIREYA sent message to MD to see if he was able to do peer to peer yesterday and if he had spoken to pt and spouse. Also that pt will need updated hc orders if discharges home. Medicare appeal pending.      Meera Solitario RN, BSN  [phone]

## 2022-04-21 NOTE — PROGRESS NOTES
Physical Therapy  Facility/Department: [address] ONCOLOGY  Physical Therapy Treatment    Name: Amanda Okeefe  : 1940  MRN: [id number]  Date of Service: 2022    Discharge Recommendations:  Amanda Okeefe scored a 17/24 on the AM-PAC short mobility form. Current research shows that an AM-PAC score of 17 or less is typically not associated with a discharge to the patient's home setting. Based on the patient's AM-PAC score and their current functional mobility deficits, it is recommended that the patient have 3-5 sessions per week of Physical Therapy at d/c to increase the patient's independence. Pt. Is undergoing chemotherapy for Cancer and will likely have fluctuating days with varying need of assist level. Please see assessment section for further patient specific details. If patient discharges prior to next session this note will serve as a discharge summary. Please see below for the latest assessment towards goals. PT Equipment Recommendations  Equipment Needed: Yes  Mobility Devices: Doris Mohan: Rolling  Other: If Pt. is d/c'd home would recommend a rolling walker for safety with ambualtion. For safety would recommend a BSC as well as a tub transfer bench. Patient Diagnosis(es): The primary encounter diagnosis was Acute deep vein thrombosis (DVT) of right peroneal vein ([address] Diagnoses of General weakness, Fall in home, initial encounter, Hip pain, right, Malignant neoplasm of esophagus, unspecified location ([address] and Acute deep vein thrombosis (DVT) of tibial vein of right lower extremity ([address] were also pertinent to this visit. Past Medical History:  has a past medical history of Allergic rhinitis, Anxiety state, CAD (coronary artery disease), Cataract, Chronic ischemic heart disease, Esophageal reflux, Essential hypertension, Glaucoma, Influenza, Insomnia, and Other and unspecified hyperlipidemia.   Past Surgical History:  has a past surgical history that includes Coronary angioplasty with stent; eye surgery; hernia repair; Colonoscopy; ERCP (01/16/2018); Cholecystectomy, laparoscopic (01/17/2018); Upper gastrointestinal endoscopy (N/A, 2/2/2022); Colonoscopy (N/A, 2/2/2022); and IR PORT PLACEMENT > 5 YEARS (2/15/2022). Assessment   Assessment: Pt. and wife very overwhelmed with insurance denial of SNF. PT offered education and support. Pt. is functioning at a Contact A level for all functional mobility and distance of ambulation as well as balance with ambulation impaired. Pt. has 12 steps to enter the home and this date, Pt. fatiuged and emotional and unsafe to attempt. D/c to home is concerning from a safety level. If Pt. is d/c'd home, would frontload home PT.   Treatment Diagnosis: Impaired functional mobility  Therapy Prognosis: Good  Barriers to Learning: None  Requires PT Follow-Up: Yes  Activity Tolerance  Activity Tolerance: Patient limited by fatigue;Patient limited by endurance  Activity Tolerance Comments: pt fatigued quickly with all OOB mobility and functional tasks     Plan   Plan  Plan: 3-5 times per week  Current Treatment Recommendations: Strengthening,Balance training,Functional mobility training,Transfer training,Gait training,Endurance training,Stair training,Safety education & training,Home exercise program  Safety Devices  Type of Devices: Call light within reach,Chair alarm in place,Gait belt,Patient at risk for falls  Restraints  Restraints Initially in Place: No     Restrictions  Restrictions/Precautions  Restrictions/Precautions: Fall Risk,Up as Tolerated  Required Braces or Orthoses?: No  Position Activity Restriction  Other position/activity restrictions: Alayna Born is a 78 y.o. male in February with acute GI bleed required 1 unit of PRBCs had a EGD and colonoscopy which showed small colon polyps and extensive esophageal tumors found to be adenocarcinoma of the.  Patient has been having chemotherapy for this today patient felt that his wife called EMS patient had 3 falls over the last couple weeks patient has become extremely weak poor p.o. intake no chest pain no lightheadedness or dizziness. Patient had increased right lower extremity swelling compared to left extremity had ultrasound performed which showed right lower extremity DVT. Nursing approval obtained prior to session 4/20 due to increased O2 demands     Subjective   General  Chart Reviewed: Yes  Response To Previous Treatment: Patient with no complaints from previous session. Family / Caregiver Present: No  Referring Practitioner: Dr. Jeremy Pérez  Referral Date : 04/21/22  Diagnosis: General weakness  Follows Commands: Within Functional Limits  General Comment  Comments: Pt. supine in bed, upset over overall situation and very concerned of d/c plans. Emotional support provided  Subjective  Subjective: Pt denies pain, Agreeable to working with PT. Social/Functional History  Social/Functional History  Lives With: Spouse  Type of Home: House  Home Layout: Two level (Enter through the basement)  Home Access: Stairs to enter with rails  Entrance Stairs - Number of Steps: 12  Entrance Stairs - Rails: Both  Bathroom Shower/Tub: Tub/Shower unit  Bathroom Equipment: Grab bars in shower,Hand-held shower  Bathroom Accessibility: Accessible  Home Equipment: Rolling walker,Cane  ADL Assistance: Independent (Pt reports getting very fatigued with dressing and bathing)  Homemaking Assistance: Needs assistance  Homemaking Responsibilities: No  Ambulation Assistance: Independent (Uses cane)  Transfer Assistance: Independent  Active : No  Leisure & Hobbies: Dance  Additional Comments:  Three falls in the past 3 weeks                                         Bed Mobility Training  Bed Mobility Training: Yes  Rolling: Supervision  Supine to Sit: Minimum assistance (From a flat bed without use of rail)  Scooting: Supervision  Balance  Sitting: Impaired  Sitting - Static: Good (unsupported)  Sitting - Dynamic: Fair (occasional)  Standing: Impaired  Standing - Static: Constant support  Standing - Dynamic: Constant support  Transfer Training  Transfer Training: Yes  Overall Level of Assistance: Contact-guard assistance  Sit to Stand: Contact-guard assistance  Stand to Sit: Contact-guard assistance  Toilet Transfer: Contact-guard assistance  Gait Training  Gait Training: Yes  Gait  Overall Level of Assistance: Contact-guard assistance  Interventions: Safety awareness training; Tactile cues; Verbal cues  Base of Support: Narrowed  Speed/Sasha: Slow;Shuffled  Step Length: Left shortened;Right shortened  Stance: Left increased;Right increased  Gait Abnormalities:  (Forward flexed onto walker with forward head)  Distance (ft): 75 Feet  Assistive Device: Walker, rolling  Stairs - Level of Assistance:  (Unable this date d/t emotional status and fatigue)                 Exercise Treatment: Encouraged seated marching, ankle pumps - family overwhelmed. Discussed best treatment to walk at this time at home and the home therapist can prescribe further exercises                                                        AM-PAC Score  AM-PAC Inpatient Mobility Raw Score : 17 (04/21/22 1025)  AM-PAC Inpatient T-Scale Score : 42.13 (04/21/22 1025)  Mobility Inpatient CMS 0-100% Score: 50.57 (04/21/22 1025)  Mobility Inpatient CMS G-Code Modifier : CK (04/21/22 1025)          Goals  Short Term Goals  Time Frame for Short term goals: to be met by DC  Short term goal 1: Pt will transfer supine to/from sit with supervision  Short term goal 2: Pt will transfer sit to/from stand with supervision  Short term goal 3: Pt will amb 76' with RW and supervision  Short term goal 4: Navigate up/down 4 steps with rail and min A  Long Term Goals  Time Frame for Long term goals : LTG=STG  Patient Goals   Patient goals :  To get stronger       Therapy Time   Individual Concurrent Group Co-treatment   Time In 0837         Time Out 1000         Minutes 83 Timed Code Treatment Minutes: Elenita Reddy 892, [address] [facility], [id number]

## 2022-04-21 NOTE — PROGRESS NOTES
Occupational Therapy  Facility/Department: [address] ONCOLOGY  Daily Treatment Note  NAME: Rylie Morel  : 1940  MRN: [id number]    Date of Service: 2022    Discharge Recommendations:Sandro Calderon scored a 16/24 on the AM-PAC ADL Inpatient form. Current research shows that an AM-PAC score of 17 or less is typically not associated with a discharge to the patient's home setting. Based on the patient's AM-PAC score and their current ADL deficits, it is recommended that the patient have 3-5 sessions per week of Occupational Therapy at d/c to increase the patient's independence. Please see assessment section for further patient specific details. If patient discharges prior to next session this note will serve as a discharge summary. Please see below for the latest assessment towards goals. Patient Diagnosis(es): The primary encounter diagnosis was Acute deep vein thrombosis (DVT) of right peroneal vein ([address] Diagnoses of General weakness, Fall in home, initial encounter, Hip pain, right, Malignant neoplasm of esophagus, unspecified location ([address] and Acute deep vein thrombosis (DVT) of tibial vein of right lower extremity ([address] were also pertinent to this visit. Assessment    Assessment: Patient presents with deficits in ADLs, IADLs, transfers mobility, which are below baseline, and would benefit from continued skill OT to address  Activity Tolerance: Patient limited by fatigue;Patient limited by endurance      Plan   Plan  Times per Week: 3-5x/wk  Times per Day: Daily  Current Treatment Recommendations: Strengthening;Balance training;Functional mobility training; Endurance training;Self-Care / ADL; Home management training;Patient/Caregiver education & training; Safety education & training;Equipment evaluation, education, & procurement     Subjective   Subjective  Subjective: Patient supine upon arrival, spouse present and asked appropriate questions  Pain: Denies  Cognition  Overall Cognitive Status: Exceptions  Arousal/Alertness: Appropriate responses to stimuli  Following Commands: Follows one step commands with increased time  Attention Span: Difficulty dividing attention  Memory: Decreased recall of precautions;Decreased recall of recent events  Safety Judgement: Decreased awareness of need for safety (Impulsive)  Problem Solving: Decreased awareness of errors  Insights: Decreased awareness of deficits  Initiation: Requires cues for some  Cognition Comment: Patient required education on using RW verses cane. patient required some cueing for safety with RW        Objective      Grooming/Oral Hygiene     Assistance Level Contact guard assist; Minimal assistanceAssistance Level. Contact guard assist; Minimal assistance. The comment is Patient fatigued following toileting, required increased assist to maintain balance, pt leaning forward heavily onto sink. Taken on 4/21/22 1239   Skilled Clinical Factors --   Upper Extremity Bathing     Equipment Provided --   Assistance Level Contact guard assist; Stand by assist; Set-up   Skilled Clinical Factors --   Lower Extremity Bathing     Equipment Provided --   Assistance Level Maximum assistance; Moderate assistance; Verbal cues; Set-up   Skilled Clinical Factors verbal cues during functional mobility as depends fell to ankles (patient unaware and required cues to cease ambulation to assist with doffing depends for safety)   Upper Extremity Dressing     Equipment Provided --   Assistance Level --   Skilled Clinical Factors --   Lower Extremity Dressing     Equipment Provided --   Assistance Level Moderate assistance; Maximum assistanceAssistance Level. Moderate assistance; Maximum assistance. The comment is doffing/donning depends.  Taken on 4/21/22 1237   Skilled Clinical Factors --   Putting On/Taking Off Footwear     Equipment Provided --   Assistance Level --   Skilled Clinical Factors --   Toileting     Equipment Provided --   Assistance Level Moderate assistance; Set-up; Verbal cues; Increased time to completeAssistance Level. Moderate assistance; Set-up; Verbal cues; Increased time to complete. The comment is Patient incontinent of loose stool, required assist to doff depends, cues for thorough pericare. Taken on 4/21/22 1237   Skilled Clinical Factors --   Toilet Transfers     Technique --Technique. no value. . The comment is ambulating. Taken on 4/21/22 1237   Equipment Standard toilet   Additional Factors Set-up; Verbal cues; Cues for hand placement   Assistance Level Minimal assistance; Contact guard assist   Skilled Clinical Factors --                Bed Mobility Training  Bed Mobility Training: Yes  Rolling: Supervision  Supine to Sit: Minimum assistance (From a flat bed without use of rail)  Scooting: Supervision  Transfer Training  Transfer Training: Yes  Overall Level of Assistance: Contact-guard assistance  Sit to Stand: Contact-guard assistance  Stand to Sit: Contact-guard assistance  Toilet Transfer: Contact-guard assistance  Balance  Sitting: Impaired  Sitting - Static: Good (unsupported)  Sitting - Dynamic: Fair (occasional)  Standing: Impaired  Standing - Static: Constant support  Standing - Dynamic: Constant support  Gait  Overall Level of Assistance: Contact-guard assistance  Interventions: Safety awareness training; Tactile cues; Verbal cues  Base of Support: Narrowed  Speed/Sasha: Slow;Shuffled  Step Length: Left shortened;Right shortened  Stance: Left increased;Right increased  Gait Abnormalities:  (Forward flexed onto walker with forward head)  Distance (ft): 75 Feet  Assistive Device: Walker, rolling  Stairs - Level of Assistance:  (Unable this date d/t emotional status and fatigue)                      Goals  Short Term Goals  Time Frame for Short term goals: Discharge  Short Term Goal 1: Functional transfers SBA- CGA to Eli (increased unsteadiness with fatigue) 4//21  Short Term Goal 2: Toileting SBA- mod A 4/21  Short Term Goal 3: UB ADLs SBA- min A 4/19  Short Term Goal 4: LB ADLs SBA- mod A 4/21  Short Term Goal 5: Grooming standing at sink for 5 mins with RW and no LOB- 2 min 4/19  Long Term Goals  Time Frame for Long term goals : STG=LTG       Therapy Time   Individual Concurrent Group Co-treatment   Time In 1110         Time Out 1203         Minutes 53              Timed Code Treatment Minutes:  53 Minutes    Total Treatment Minutes:  [address] [facility], OTR/L [id number]

## 2022-04-21 NOTE — CARE COORDINATION
CM informed per Randall Banerjee CM appeals are still pending. CM updated pt and spouse at bedside.     Yesenia Horton RN, BSN  [phone]

## 2022-04-21 NOTE — PROGRESS NOTES
Shift assessment completed. Routine vitals stable. Scheduled medications given. Patient is awake, alert and oriented. Respirations are easy and unlabored. Pt does not c/o pain at this time. Pt is tearful and anxious r/t discharge plans to home instead of SNF. Pt would like to go to SNF at d/c. Pt reassured that Case Management would talk to him about his discharge. Call light within reach.

## 2022-04-21 NOTE — CARE COORDINATION
CM noted PT notes from yesterday afternoon at 4:36pm still recommend snf and pt ambulated less than previous day. Pt ambulated only 20 ft compared to 12ft x 2 then 110 ft on 4/19. Pt has 12 steps to enter home. Therapy recommends non home discharge for safety. Peer to peer was not done based on distance of ambulation previously. Due to pt's performance with therapy yesterday and this am they are recommending pt go to snf and note pt is limited by endurance and limited by fatigue. Patient ambulated only 75 feet with PT this am.    OT notes are pending and maximo with therapy was sent message by this CM and has let team know. CM called July with [facility] [phone] to see if we could attempt to get patient to facility again. She states she has an appeal number for them since peer to peer not done. CM at 10:30 am provided pt and spouse with appeal phone number [phone]. CM checked with them at this time and spouse states they did call number and were told \" we will investigate. \"     Andrade Fernando with [facility] seeing pt and spouse and providing information in case pt denied skilled services again and would need to dc home. If pt would d/c home therapy recommends : Rolling walker and BSC and tub transfer bench. Pt and spouse aware poc. They states are grateful for CM efforts.      Leonel Cummings, RN, BSN  [phone]

## 2022-04-21 NOTE — CARE COORDINATION
MIREYA notified Viki Galeazzi with [employer] and verified she has no aide availability. MIREYA called [place] with [facility] and referred pt, she will call MIREYA back. MIREYA went to update patient and he is currently in restroom with therapy, will update later.       Prerna Amezcua RN, BSN  [phone]

## 2022-04-21 NOTE — PLAN OF CARE
Problem: Falls - Risk of:  Goal: Will remain free from falls  Description: Will remain free from falls  Outcome: Progressing  Goal: Absence of physical injury  Description: Absence of physical injury  Outcome: Progressing     Problem: Skin Integrity:  Goal: Will show no infection signs and symptoms  Description: Will show no infection signs and symptoms  Outcome: Progressing  Goal: Absence of new skin breakdown  Description: Absence of new skin breakdown  Outcome: Progressing     Problem: Nutrition  Goal: Optimal nutrition therapy  Outcome: Progressing     Problem: Pain:  Goal: Pain level will decrease  Description: Pain level will decrease  Outcome: Progressing  Goal: Control of acute pain  Description: Control of acute pain  Outcome: Progressing  Goal: Control of chronic pain  Description: Control of chronic pain  Outcome: Progressing

## 2022-04-21 NOTE — PROGRESS NOTES
[facility]    4/21/2022 11:53 AM        Name: Ligia Fry . Admitted: 4/12/2022  Primary Care Provider: Sudarshan Aranda MD (Tel: [phone])        Subjective:    No chest pain fever or hcills    Reviewed interval ancillary notes    Current Medications  apixaban (ELIQUIS) tablet 5 mg, BID  pramox-PE-glycerin-petrolatum cream, BID  oxyCODONE (ROXICODONE) immediate release tablet 10 mg, Q6H PRN  loperamide (IMODIUM) capsule 2 mg, 4x Daily PRN  atenolol (TENORMIN) tablet 50 mg, Daily  lisinopril (PRINIVIL;ZESTRIL) tablet 5 mg, Daily  atorvastatin (LIPITOR) tablet 10 mg, Nightly  sodium chloride flush 0.9 % injection 5-40 mL, 2 times per day  sodium chloride flush 0.9 % injection 5-40 mL, PRN  0.9 % sodium chloride infusion, PRN  ondansetron (ZOFRAN-ODT) disintegrating tablet 4 mg, Q8H PRN   Or  ondansetron (ZOFRAN) injection 4 mg, Q6H PRN  polyethylene glycol (GLYCOLAX) packet 17 g, Daily PRN  acetaminophen (TYLENOL) tablet 650 mg, Q6H PRN   Or  acetaminophen (TYLENOL) suppository 650 mg, Q6H PRN        Objective:  /83   Pulse 84   Temp 98 °F (36.7 °C) (Oral)   Resp 18   Ht 5' 10\" (1.778 m)   Wt 124 lb 11.2 oz (56.6 kg)   SpO2 94%   BMI 17.89 kg/m²     Intake/Output Summary (Last 24 hours) at 4/21/2022 1153  Last data filed at 4/21/2022 0434  Gross per 24 hour   Intake --   Output 720 ml   Net -720 ml      Wt Readings from Last 3 Encounters:   04/15/22 124 lb 11.2 oz (56.6 kg)   02/15/22 121 lb 4.1 oz (55 kg)   02/02/22 156 lb (70.8 kg)     General appearance:  Appears comfortable.  AAOx3  HEENT: atraumatic, Pupils equal, muscous membranes moist, no masses appreciated  Cardiovascular: Regular rate and rhythm no murmurs appreciated  Respiratory: CTAB no wheezing  Gastrointestinal: Abdomen soft, non-tender, BS+  EXT: RLE>LLE swelling  Neurology: no gross focal deficts  Psychiatry: Appropriate affect. Not agitated  Skin: Warm, dry, no rashes appreciated    Labs and Tests:  CBC:   Recent Labs     04/19/22  0620 04/20/22  0530 04/21/22  0440   WBC 4.5 3.5* 3.7*   HGB 11.9* 11.5* 11.6*    179 167     BMP:    Recent Labs     04/19/22  0620 04/20/22  0530 04/21/22  0440   * 133* 133*   K 3.9 3.9 3.9    106 103   CO2 20* 19* 21   BUN 16 17 17   CREATININE 0.6* 0.7* 0.7*   GLUCOSE 110* 96 99     Hepatic:   No results for input(s): AST, ALT, ALB, BILITOT, ALKPHOS in the last 72 hours. VL Extremity Venous Right         CT Head WO Contrast   Final Result   No acute intracranial abnormality. Moderate senescent changes with parenchymal volume loss and chronic small   vessel ischemic changes. XR HIP 2-3 VW W PELVIS RIGHT   Final Result   1. No acute abnormality. Recent imaging reviewed    Problem List  Principal Problem:    DVT femoral (deep venous thrombosis) with thrombophlebitis, bilateral (HCC)  Active Problems:    Severe malnutrition (HCC)  Resolved Problems:    * No resolved hospital problems.  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

## 2022-04-21 NOTE — PROGRESS NOTES
[facility] home care referral. Spoke with pt and re: home care plan of care/services. Agreeable if appeal for snf gets denied. Demographic's verified. Will follow for home care.

## 2022-04-22 VITALS
SYSTOLIC BLOOD PRESSURE: 121 MMHG | TEMPERATURE: 97.6 F | HEART RATE: 73 BPM | HEIGHT: 70 IN | RESPIRATION RATE: 18 BRPM | DIASTOLIC BLOOD PRESSURE: 77 MMHG | BODY MASS INDEX: 17.85 KG/M2 | WEIGHT: 124.7 LBS | OXYGEN SATURATION: 94 %

## 2022-04-22 LAB
ANION GAP SERPL CALCULATED.3IONS-SCNC: 8 MMOL/L (ref 3–16)
BASOPHILS ABSOLUTE: 0 K/UL (ref 0–0.2)
BASOPHILS RELATIVE PERCENT: 0.7 %
BUN BLDV-MCNC: 19 MG/DL (ref 7–20)
CALCIUM SERPL-MCNC: 7.6 MG/DL (ref 8.3–10.6)
CHLORIDE BLD-SCNC: 103 MMOL/L (ref 99–110)
CO2: 21 MMOL/L (ref 21–32)
CREAT SERPL-MCNC: 0.8 MG/DL (ref 0.8–1.3)
EOSINOPHILS ABSOLUTE: 0.1 K/UL (ref 0–0.6)
EOSINOPHILS RELATIVE PERCENT: 1.4 %
GFR AFRICAN AMERICAN: >60
GFR NON-AFRICAN AMERICAN: >60
GLUCOSE BLD-MCNC: 106 MG/DL (ref 70–99)
HCT VFR BLD CALC: 33.4 % (ref 40.5–52.5)
HEMOGLOBIN: 11 G/DL (ref 13.5–17.5)
LYMPHOCYTES ABSOLUTE: 0.9 K/UL (ref 1–5.1)
LYMPHOCYTES RELATIVE PERCENT: 22.2 %
MCH RBC QN AUTO: 30.3 PG (ref 26–34)
MCHC RBC AUTO-ENTMCNC: 32.9 G/DL (ref 31–36)
MCV RBC AUTO: 92.2 FL (ref 80–100)
MONOCYTES ABSOLUTE: 0.1 K/UL (ref 0–1.3)
MONOCYTES RELATIVE PERCENT: 2.6 %
NEUTROPHILS ABSOLUTE: 3.1 K/UL (ref 1.7–7.7)
NEUTROPHILS RELATIVE PERCENT: 73.1 %
PDW BLD-RTO: 17.1 % (ref 12.4–15.4)
PLATELET # BLD: 145 K/UL (ref 135–450)
PMV BLD AUTO: 6.6 FL (ref 5–10.5)
POTASSIUM REFLEX MAGNESIUM: 4 MMOL/L (ref 3.5–5.1)
RBC # BLD: 3.62 M/UL (ref 4.2–5.9)
SODIUM BLD-SCNC: 132 MMOL/L (ref 136–145)
WBC # BLD: 4.2 K/UL (ref 4–11)

## 2022-04-22 PROCEDURE — 85025 COMPLETE CBC W/AUTO DIFF WBC: CPT

## 2022-04-22 PROCEDURE — 6370000000 HC RX 637 (ALT 250 FOR IP): Performed by: INTERNAL MEDICINE

## 2022-04-22 PROCEDURE — 97116 GAIT TRAINING THERAPY: CPT

## 2022-04-22 PROCEDURE — 94760 N-INVAS EAR/PLS OXIMETRY 1: CPT

## 2022-04-22 PROCEDURE — 6370000000 HC RX 637 (ALT 250 FOR IP): Performed by: STUDENT IN AN ORGANIZED HEALTH CARE EDUCATION/TRAINING PROGRAM

## 2022-04-22 PROCEDURE — 97530 THERAPEUTIC ACTIVITIES: CPT

## 2022-04-22 PROCEDURE — 80048 BASIC METABOLIC PNL TOTAL CA: CPT

## 2022-04-22 PROCEDURE — 2580000003 HC RX 258: Performed by: INTERNAL MEDICINE

## 2022-04-22 RX ADMIN — SODIUM CHLORIDE, PRESERVATIVE FREE 10 ML: 5 INJECTION INTRAVENOUS at 08:20

## 2022-04-22 RX ADMIN — ATENOLOL 50 MG: 50 TABLET ORAL at 08:20

## 2022-04-22 RX ADMIN — APIXABAN 5 MG: 5 TABLET, FILM COATED ORAL at 08:20

## 2022-04-22 RX ADMIN — LISINOPRIL 5 MG: 5 TABLET ORAL at 08:20

## 2022-04-22 RX ADMIN — OXYCODONE 10 MG: 5 TABLET ORAL at 00:36

## 2022-04-22 ASSESSMENT — PAIN SCALES - GENERAL
PAINLEVEL_OUTOF10: 0
PAINLEVEL_OUTOF10: 8

## 2022-04-22 ASSESSMENT — PAIN SCALES - WONG BAKER
WONGBAKER_NUMERICALRESPONSE: 0

## 2022-04-22 NOTE — PROGRESS NOTES
Shift assessment completed. Routine vitals stable. Scheduled medications given. Patient is awake, alert and oriented. Respirations are easy and unlabored. Pt has no s/o pain at this time. Wife at pt bedside at this time. Call light within reach.

## 2022-04-22 NOTE — CARE COORDINATION
Faxed referral to [address] [place] [address] Dr. Diaz, confirmed.     Electronically signed by JEEVAN HerreraN RN

## 2022-04-22 NOTE — PROGRESS NOTES
CLINICAL PHARMACY NOTE: MEDS TO BEDS    Total # of Prescriptions Filled: 1   The following medications were delivered to the patient:  · eliquis 5    Additional Documentation:  otc loperamide sold. Wife lost rx from 4/14. Spoke with her about not doubling up if she finds them. She paid over the phone. Ok to deliver to bedside per Mahnaz Stephens, patient signed for scripts.

## 2022-04-22 NOTE — CARE COORDINATION
Pt nor wife called Eliquis number to activate co pay card. Per Elif Christina with [facility] senior advisers she delivered [facility] to home and wife could not find Eliquis medication she signed for here at the hospital.     CM called Eliquis activation number [phone] and with help of outpt pharmacy on [address] call had card activated. It was approved for 10 dollars and [employer]  with outpt pharmacy delivered pt's eliquis. CM called spouse and updated. CM updated Elif Sanford with [employer]. Patient discharged 4/22/2021 to home via [address] transport and [facility], palliative care, [employer]. All discharge needs met per case management.     Dominga Falcon RN, BSN  [phone]

## 2022-04-22 NOTE — PROGRESS NOTES
Aware of discharge with home care. Home care orders sent to [facility]. Discharge planner notified.

## 2022-04-22 NOTE — PROGRESS NOTES
Physical Therapy  Facility/Department: [address] ONCOLOGY  Physical Therapy Treatment    Name: Amber Gottlieb  : 1940  MRN: [id number]  Date of Service: 2022    Discharge Recommendations:  Amber Gottlieb scored a 19/24 on the AM-PAC short mobility form. Current research shows that an AM-PAC score of 17 or less is typically not associated with a discharge to the patient's home setting. Based on the patient's AM-PAC score and their current functional mobility deficits, it is recommended that the patient have 3-5 sessions per week of Physical Therapy at d/c to increase the patient's independence. Please see assessment section for further patient specific details. If patient discharges prior to next session this note will serve as a discharge summary. Please see below for the latest assessment towards goals. If patient is to d/c home, PT recommends 24 hour (A) and home PT S3.  HOME HEALTH CARE: LEVEL 3 SAFETY     - Initial home health evaluation to occur within 24-48 hours, in patient home   - Therapy evaluations in home within 24-48 hours of discharge; including DME and home safety   - Frontload therapy 5 days, then 3x a week   - Therapy to evaluate if patient has [address] needs for personal care   -  evaluation within 24-48 hours, includes evaluation of resources and insurance to determine AL, [place], LTC, and Medicaid options      (3-5)   PT Equipment Recommendations  Equipment Needed: Yes  Mobility Devices: Abril New: Rolling  Other: If Pt. is d/c'd home would recommend a rolling walker for safety with ambualtion. For safety would recommend a BSC as well as a tub transfer bench. Patient Diagnosis(es): The primary encounter diagnosis was Acute deep vein thrombosis (DVT) of right peroneal vein ([facility] [address]  Diagnoses of General weakness, Fall in home, initial encounter, Hip pain, right, Malignant neoplasm of esophagus, unspecified location [facility]), and Acute deep vein thrombosis (DVT) of tibial vein of right lower extremity ([address] were also pertinent to this visit. Past Medical History:  has a past medical history of Allergic rhinitis, Anxiety state, CAD (coronary artery disease), Cataract, Chronic ischemic heart disease, Esophageal reflux, Essential hypertension, Glaucoma, Influenza, Insomnia, and Other and unspecified hyperlipidemia. Past Surgical History:  has a past surgical history that includes Coronary angioplasty with stent; eye surgery; hernia repair; Colonoscopy; ERCP (01/16/2018); Cholecystectomy, laparoscopic (01/17/2018); Upper gastrointestinal endoscopy (N/A, 2/2/2022); Colonoscopy (N/A, 2/2/2022); and IR PORT PLACEMENT > 5 YEARS (2/15/2022). Assessment   Body Structures, Functions, Activity Limitations Requiring Skilled Therapeutic Intervention: Decreased functional mobility ; Decreased posture;Decreased endurance;Decreased ROM; Decreased strength;Decreased balance;Decreased safe awareness;Decreased vision/visual deficit  Assessment: Patient and wife overwhelmed with d/c home today, extended conversation and answered questions related to d/c, confirmed transportation to assist patient into home with 12 stairs to enter, home care to begin Monday. Patient's wife is frail, unable to physically (A) patient - Elif Christina from [place] present to provide information/resources to assist wife. Patient able to perform mobility with RW and SBA this date - limited tolerance for activity, fatigues easily. Patient currently unsafe to attempt stair negotiation. PT remains concerned related to d/c home and safety of wife/patient with limited to no outside (A)/support and home care beginning Monday. PT continues to recommend 24 hour (A) for mobility needs. Patient remains a high fall risk. Patient will continue to benefit from additional skilled PT intervention to facilitate safe mobility and to optimize (I) to promote return to prior level of function.   Treatment Diagnosis: impaired functional mobility  Therapy Prognosis: Good  Barriers to Learning: None  Requires PT Follow-Up: Yes  Activity Tolerance  Activity Tolerance: Patient limited by fatigue;Patient limited by endurance     Plan   Plan  Plan: 3-5 times per week  Current Treatment Recommendations: Strengthening,Balance training,Functional mobility training,Transfer training,Gait training,Endurance training,Stair training,Safety education & training,Home exercise program,ROM,Patient/Caregiver education & training,Equipment evaluation, education, & procurement,Therapeutic activities  Safety Devices  Type of Devices: Bed alarm in place,Call light within reach,Gait belt,Left in bed,Nurse notified  Restraints  Restraints Initially in Place: No     Restrictions  Restrictions/Precautions  Restrictions/Precautions: Fall Risk (high fall risk, up with assistance, adult diet - regular)  Required Braces or Orthoses?: No  Position Activity Restriction  Other position/activity restrictions: Santo Burns is a 78 y.o. male on [facility] MD service who was admitted on 4/12/2022 for acute DVT of right lower extremity. He came to the ED after falling a few times at home. CT head was negative for acute findings. He presented with some swelling and discomfort of right lower extremity. Venous doppler was done and revealed totally occluding deep vein thrombosis involving the right peroneal vein and right popliteal vein. He was started on heparin gtt. He has a history of atrial fibrillation, but has not been on any anticoagulation due to GI bleeding in February of this year. He is patient of Dr. 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 He is on active treatment with FOLFOX plus trastuzumab and pembrolizumab. Last treatment of trastuzumab and pembrolizumab only was given on 3/29/2022.  He was due for FOLFOX treatment on 4/5/2022, but it was held due to neutropenia. Subjective   General  Chart Reviewed: Yes  Family / Caregiver Present: Yes (wife)  Follows Commands: Within Functional Limits  General Comment  Comments: Patient supine in bed upon arrival - agreeable to PT. Subjective  Subjective: Patient denies pain. Wife and patient with several questions/concerns related to d/c planning and home d/c today - PT assisted in addressing questions/concerns, including transport to assist patient into home with 12 stairs to enter and BSC adjusted to anticipated appropriate height for home use.     Objective:  Bed mobility  Supine to Sit: Modified independent (effortful, increased time to perform)  Sit to Supine: Modified independent (with elevated head of bed)  Scooting: Modified independent (seated at edge of bed)  Transfers  Sit to Stand: Stand by assistance (to RW - cues for hand placement and sequencing)  Stand to sit: Stand by assistance (from RW - cues for hand placement and sequencing)  Stand Pivot Transfers: Stand by assistance (with RW; toilet transfer with CGA and cues for safety secondary to increased speed of mobility related to urgency)  Ambulation  Surface: level tile  Device: Rolling Walker  Assistance: Stand by assistance  Quality of Gait: forward flexed trunk, decreased (B) step length, foot clearance, and heel strike; intermittent cues for RW management and body position within RW  Distance: 30ft x 2, 15ft x 2  Comments: seated rest in between trials; \"too tired to walk in the abdi today especially if I am going home\"     Balance  Posture: Fair (forward flexed, rounded shoulders)  Sitting - Static: Good  Sitting - Dynamic: Good  Standing - Static: Fair;+  Standing - Dynamic: Fair  Comments: SBA for hand hygiene at sink with RW       AM-PAC Score  AM-PAC Inpatient Mobility Raw Score : 19 (04/22/22 1242)  AM-PAC Inpatient T-Scale Score : 45.44 (04/22/22 1242)  Mobility Inpatient CMS 0-100% Score: 41.77 (04/22/22 1242)  Mobility Inpatient CMS G-Code Modifier : CK (04/22/22 1242)          Goals  Short Term Goals  Time Frame for Short term goals: to be met by DC  Short term goal 1: Pt will transfer supine to/from sit with supervision - goal met 4/22/22  Short term goal 2: Pt will transfer sit to/from stand with supervision  Short term goal 3: Pt will amb 76' with RW and supervision  Short term goal 4: Navigate up/down 4 steps with rail and min A  Long Term Goals  Time Frame for Long term goals : LTG=STG  Patient Goals   Patient goals : \"to go home, I guess\"       Therapy Time   Individual Concurrent Group Co-treatment   Time In 1144         Time Out 1228         Minutes 44                 Timed Code Treatment Minutes: 44 minutes    Total Treatment Minutes: 44 Minutes    If patient discharges prior to next treatment, this note will serve as discharge summary.     Michelle Pump PT, DPT #[id number]

## 2022-04-22 NOTE — PROGRESS NOTES
Follow up call with [facility] representative Wilberto Mata) regarding secondary request. Jordin Gore contact number [phone] OPT8. States no change. States if wife has called and filed the appeal then there is a 72 hour turn around and will follow up with her at such time. DC POC will proceed to home with [facility] and [place] team to address any additional support needs for patient and wife at this time. MIREYA Valentin updated on these discussions. LM to Palliative/Isadora to assess patient/wife to determine if they would want Palliative to follow in home setting.

## 2022-04-22 NOTE — PROGRESS NOTES
Nutrition Note    RECOMMENDATIONS  1. PO Diet: Continue current diet  2. ONS: Continue current nutrition supplement  3. Other: If pt does not d/c today, please obtain updated wt    NUTRITION ASSESSMENT   Pt triggered for follow-up. No documented meal intakes since 4/18. Regular diet, receiving Ensure Compact BID. Upon visiting pt this morning, when asked if his appetite and po intake have been improving, reported there are \"some food items he has done very well with consuming\". Jayla Jiménez he has been drinking the nutrition supplements. Reported hopefully discharging today. RD will continue to monitor.  Nutrition Related Findings: -2.9L. Na 132. LBM 4/21, BS+. +1 pitting edema of BLE.  Wounds: None   Nutrition Education:  Education completed    Nutrition Goals: PO intake 50% or greater     MALNUTRITION ASSESSMENT   Context of Malnutrition: Chronic Illness  Malnutrition Status: Severe malnutrition  Findings of the 6 clinical characteristics of malnutrition:  Energy Intake:  7 - 75% or less estimated energy requirements for 1 month or longer  Weight Loss:  7 - Greater than 20% over 1 year     Body Fat Loss:  1 - Mild body fat loss Triceps   Muscle Mass Loss:  1 - Mild muscle mass loss Clavicles (pectoralis & deltoids),Temples (temporalis),Hand (interosseous)  Fluid Accumulation:  7 - Severe Extremities (+3 pitting BLE)   Strength:  Not Performed    NUTRITION DIAGNOSIS   · Severe malnutrition related to inadequate protein-energy intake,catabolic illness as evidenced by weight loss greater than or equal to 20% in 1 year,poor intake prior to admission,mild loss of subcutaneous fat,mild muscle loss    CURRENT NUTRITION THERAPIES  ADULT DIET;  Regular  ADULT ORAL NUTRITION SUPPLEMENT; Lunch, Dinner; Standard 4 oz Oral Supplement     PO Intake: Unable to assess (no documented intake since 4/18)   PO Supplement Intake:Unable to assess (reported drinking)    ANTHROPOMETRICS   Current Height: 5' 10\" (177.8 cm)   Current Weight: 124 lb 11.2 oz (56.6 kg)     Admission weight:     Ideal Body Weight (IBW): 166 lbs  (75 kg)        BMI: 17.8    COMPARATIVE STANDARDS  Energy (kcal):  1710 - 1995     Protein (g):  86 - 103       Fluid (mL/day):  1710 - 1995    The patient will be monitored per nutrition standards of care. Consult dietitian if additional nutrition interventions are needed prior to RD reassessment.      Oskar Johnston RD, LD    Contact: [phone]

## 2022-04-22 NOTE — CARE COORDINATION
CM spent significant amount of time on pt and discharge planning with pt and spouse this am.     CM called pt's insurance [phone] and spoke to [facility] who states there is no record of a new appeal. CM spoke to [address] at [facility] and she has no information either and also [place] with CM checked and no record of new appeal started by patient ( this was an appeal to try to get into snf started per pt's wife yesterday). Yolande Read medicare appeal was denied. CM spoke to pt and explained and provided nocoverage continued stay form which pt reviewed and signed and verbalized understanding. CM placed copy in hard chart, pt has original and copy provided to [place] in [address]. CM informed pt and spouse ( who currently is now at bedside) that transport will be here to take pt home at 1pm. Currently Brynn Villarreal With [employer] is in room working with pt and spouse to assist with home needs at discharge and assisting with possible facility placement. Spouse now has reference number for [facility] appeal which is [phone] but this can take up to 72 hours. Brynn Villarreal with [facility] will continue to assist with insurance. CM informed they could private pay at [facility] but that 12,000 would be needed up front to cover for 30 days. CM informed pt and spouse shower chair not covered by insurance and would need to be bought. Also vm left for Taty with [facility] that DME order for [facility] is in chart. Also asked for meds to bed. CM sent MD message that palliative care placed for additional assistance at home. CM left vm for spencer and reyes with palliative and time that pt is leaving.      Meera Solitario, RN, BSN  [phone]

## 2022-04-22 NOTE — CARE COORDINATION
CM checked with pt and he states he was hung up on when calling Eliquis co pay number on card. He is calling again. Cm returned call and spoke to Ethel at [address] transport who states cannot take bedside commode d/t does not fold down. MIREYA called Irvin Seth with [employer]  and she is coming to hospital now to get [facility] and deliver to pt's home and will check to see if spouse has starter pack of Eliquis she signed for and received from outpt pharmacy here on 4/12.      Colby Gomes RN, BSN  [phone] WDL

## 2022-04-22 NOTE — PROGRESS NOTES
[facility]    4/22/2022 10:23 AM        Name: Louise Tobias . Admitted: 4/12/2022  Primary Care Provider: Christoph Woo MD (Tel: [phone])        Subjective:    No chest pain fever or hcills    Reviewed interval ancillary notes    Current Medications  apixaban (ELIQUIS) tablet 5 mg, BID  pramox-PE-glycerin-petrolatum cream, BID  oxyCODONE (ROXICODONE) immediate release tablet 10 mg, Q6H PRN  loperamide (IMODIUM) capsule 2 mg, 4x Daily PRN  atenolol (TENORMIN) tablet 50 mg, Daily  lisinopril (PRINIVIL;ZESTRIL) tablet 5 mg, Daily  atorvastatin (LIPITOR) tablet 10 mg, Nightly  sodium chloride flush 0.9 % injection 5-40 mL, 2 times per day  sodium chloride flush 0.9 % injection 5-40 mL, PRN  0.9 % sodium chloride infusion, PRN  ondansetron (ZOFRAN-ODT) disintegrating tablet 4 mg, Q8H PRN   Or  ondansetron (ZOFRAN) injection 4 mg, Q6H PRN  polyethylene glycol (GLYCOLAX) packet 17 g, Daily PRN  acetaminophen (TYLENOL) tablet 650 mg, Q6H PRN   Or  acetaminophen (TYLENOL) suppository 650 mg, Q6H PRN        Objective:  /77   Pulse 79   Temp 97.6 °F (36.4 °C) (Oral)   Resp 18   Ht 5' 10\" (1.778 m)   Wt 124 lb 11.2 oz (56.6 kg)   SpO2 94%   BMI 17.89 kg/m²     Intake/Output Summary (Last 24 hours) at 4/22/2022 1023  Last data filed at 4/22/2022 0530  Gross per 24 hour   Intake --   Output 780 ml   Net -780 ml      Wt Readings from Last 3 Encounters:   04/15/22 124 lb 11.2 oz (56.6 kg)   02/15/22 121 lb 4.1 oz (55 kg)   02/02/22 156 lb (70.8 kg)     General appearance:  Appears comfortable.  AAOx3  HEENT: atraumatic, Pupils equal, muscous membranes moist, no masses appreciated  Cardiovascular: Regular rate and rhythm no murmurs appreciated  Respiratory: CTAB no wheezing  Gastrointestinal: Abdomen soft, non-tender, BS+  EXT: RLE>LLE swelling  Neurology: no gross focal deficts  Psychiatry: Appropriate affect. Not agitated  Skin: Warm, dry, no rashes appreciated    Labs and Tests:  CBC:   Recent Labs     04/20/22  0530 04/21/22  0440 04/22/22  0440   WBC 3.5* 3.7* 4.2   HGB 11.5* 11.6* 11.0*    167 145     BMP:    Recent Labs     04/20/22  0530 04/21/22  0440 04/22/22  0440   * 133* 132*   K 3.9 3.9 4.0    103 103   CO2 19* 21 21   BUN 17 17 19   CREATININE 0.7* 0.7* 0.8   GLUCOSE 96 99 106*     Hepatic:   No results for input(s): AST, ALT, ALB, BILITOT, ALKPHOS in the last 72 hours. VL Extremity Venous Right         CT Head WO Contrast   Final Result   No acute intracranial abnormality. Moderate senescent changes with parenchymal volume loss and chronic small   vessel ischemic changes. XR HIP 2-3 VW W PELVIS RIGHT   Final Result   1. No acute abnormality. Recent imaging reviewed    Problem List  Principal Problem:    DVT femoral (deep venous thrombosis) with thrombophlebitis, bilateral (HCC)  Active Problems:    Severe malnutrition (HCC)  Resolved Problems:    * No resolved hospital problems.  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

## 2022-04-22 NOTE — PROGRESS NOTES
Discharge instructions reviewed with pt and spouse. Meds and f/u care discussed. No questions or concerns noted. Pt BSC will be transported to home by [place]. Pt transported by stretcher via [facility].

## 2022-04-22 NOTE — PROGRESS NOTES
Oncology Hematology Care   Progress Note      4/22/2022 8:39 AM        Name: Ranjana Benson . Admitted: 4/12/2022    SUBJECTIVE:  He is feeling well, denies n/v/d/c, planning SNF vs home care. Reviewed interval ancillary notes    Current Medications  apixaban (ELIQUIS) tablet 5 mg, BID  pramox-PE-glycerin-petrolatum cream, BID  oxyCODONE (ROXICODONE) immediate release tablet 10 mg, Q6H PRN  loperamide (IMODIUM) capsule 2 mg, 4x Daily PRN  atenolol (TENORMIN) tablet 50 mg, Daily  lisinopril (PRINIVIL;ZESTRIL) tablet 5 mg, Daily  atorvastatin (LIPITOR) tablet 10 mg, Nightly  sodium chloride flush 0.9 % injection 5-40 mL, 2 times per day  sodium chloride flush 0.9 % injection 5-40 mL, PRN  0.9 % sodium chloride infusion, PRN  ondansetron (ZOFRAN-ODT) disintegrating tablet 4 mg, Q8H PRN   Or  ondansetron (ZOFRAN) injection 4 mg, Q6H PRN  polyethylene glycol (GLYCOLAX) packet 17 g, Daily PRN  acetaminophen (TYLENOL) tablet 650 mg, Q6H PRN   Or  acetaminophen (TYLENOL) suppository 650 mg, Q6H PRN        Objective:  /77   Pulse 79   Temp 97.6 °F (36.4 °C) (Oral)   Resp 18   Ht 5' 10\" (1.778 m)   Wt 124 lb 11.2 oz (56.6 kg)   SpO2 94%   BMI 17.89 kg/m²     Intake/Output Summary (Last 24 hours) at 4/22/2022 0839  Last data filed at 4/22/2022 0530  Gross per 24 hour   Intake --   Output 780 ml   Net -780 ml      Wt Readings from Last 3 Encounters:   04/15/22 124 lb 11.2 oz (56.6 kg)   02/15/22 121 lb 4.1 oz (55 kg)   02/02/22 156 lb (70.8 kg)       General appearance:  Appears comfortable  Eyes: Sclera clear. Pupils equal.  ENT: Moist oral mucosa. Trachea midline, no adenopathy. Cardiovascular: Regular rhythm, normal S1, S2. No murmur. No edema in lower extremities  Respiratory: Not using accessory muscles. Good inspiratory effort. Clear to auscultation bilaterally, no wheeze or crackles.    GI: Abdomen soft, no tenderness, not distended  Musculoskeletal: No cyanosis in digits, neck supple  Neurology: CN 2-12 grossly intact. No speech or motor deficits  Psych: Normal affect. Alert and oriented in time, place and person  Skin: Warm, dry, normal turgor    Labs and Tests:  CBC:   Recent Labs     04/20/22  0530 04/21/22  0440 04/22/22  0440   WBC 3.5* 3.7* 4.2   HGB 11.5* 11.6* 11.0*    167 145     BMP:    Recent Labs     04/20/22  0530 04/21/22  0440 04/22/22  0440   * 133* 132*   K 3.9 3.9 4.0    103 103   CO2 19* 21 21   BUN 17 17 19   CREATININE 0.7* 0.7* 0.8   GLUCOSE 96 99 106*     Hepatic: No results for input(s): AST, ALT, ALB, BILITOT, ALKPHOS in the last 72 hours. ASSESSMENT AND PLAN    Principal Problem:    DVT femoral (deep venous thrombosis) with thrombophlebitis, bilateral (HCC)  Active Problems:    Severe malnutrition (HCC)  Resolved Problems:    * No resolved hospital problems. *      1. 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 - Last treatment of trastuzumab and pembrolizumab only was given on 3/29/2022.   - He was due for FOLFOX treatment on 4/5/2022, but it was held due to neutropenia. - patient will receive FOLFOX today in order to prevent further treatment delay.  -s/p FOLFOX on 4/15/22 through 4/17/22, tolerated well.     2. Acute DVT of right lower extremity  - Venous doppler showed totally occluding deep vein thrombosis involving the right peroneal vein and right popliteal vein  - Hx of atrial fibrillation, but has not been on anticoagulation d/t previous GI bleed  -On Eliquis     3. Right hip pain  - MRI hip on 3/31/2022 revealed metastatic lesion with no pathological fracture. - He has been referred to oncology orthopedic surgery. If no surgical intervention is recommended, may consider palliative radiation to hip. - Improved     4.  Diarrhea  - c diff negative 4/14/22  - GI on board  - improved    OK for discharge from oncology perspective. He will f/u with Dr. Kanwal Matt.     Pastor Sterling, [facility], [place], operated by [facility]  Oncology Hematology Care

## 2022-04-22 NOTE — CARE COORDINATION
Sharon Ball was here from 1 pm to 1:30. States they need to go  another pt. Cannot take bedside commode Cm told them I can have someone  to take to home. They also states pt said he needed \"cleaned up\" RN aware. Also arranging outpt pharmacy medications. Sharon Ball will return at 4pm to transport pt and [facility] home. Cm spoke to Nayely Sánchez in [facility] while speaking to pt and spouse on phone. Patient per [address] received a starter pack of Eliquis on 4/12 and pt's wife signed for it along with his immodium. Pt's Eliquis refill is not yet due. Spouse on phone states she does not have Eliquis. She will look for it at home per CM recommending she does. Per Nayely Sánchez in outpt pharmacy have pt activate Eliquis card and that way if not found they can bring to floor before d/c and he will have low co pay. MIREYA dialed number on Eliquis card for pt and instructed him to activate and answer questions. He is on phone at this time. Copy of Eliquis co pay card sent to outpt pharmacy. RN updated on the above.     Colby Gomse RN, BSN  [phone]

## 2022-04-22 NOTE — PROGRESS NOTES
Initial [facility] requested authorizations on 4/19/22 denied. An appeal was filed per family and unable to determine an outcome at this time. LM to July @ [facility] whom provided information to family to call for appeal as of 0820. [place] appeal for hospitalization denied. Patient to DC to home today if unable to go to [facility]. Transportation set as of 1300 today to home with [facility].     Second request for [employer] sent via [employer] as currently it is undetermined what information was sent per [facility]. Authorization ID: [id number]. LM to Yon Sanches,[address] for update in this process. Should authorization be approved after DC from hospital [facility] may assist with admission to their facility from that point of care if approved.

## 2022-04-25 ENCOUNTER — CARE COORDINATION (OUTPATIENT)
Dept: CASE MANAGEMENT | Age: 82
End: 2022-04-25

## 2022-04-25 DIAGNOSIS — K92.2 ACUTE GI BLEEDING: Primary | ICD-10-CM

## 2022-04-25 PROCEDURE — 1111F DSCHRG MED/CURRENT MED MERGE: CPT | Performed by: INTERNAL MEDICINE

## 2022-04-25 NOTE — CARE COORDINATION
[facility] Transitions Initial Follow Up Call    Call within 2 business days of discharge: Yes    Patient: Nazia Mitchell Patient : 1940   MRN: [id number]  Reason for Admission: Fall  Discharge Date: 22 RARS: Readmission Risk Score: 19.6 ( )      Last Discharge [facility]       Complaint Diagnosis Description Type Department Provider    22 Fall Acute deep vein thrombosis (DVT) of right peroneal vein ([address] . .. ED to Hosp-Admission (Discharged) (ADMITTED) [facility] Lili Petersen MD           Spoke with: Katina 61: [facility]    Non-face-to-face services provided:  Obtained and reviewed discharge summary and/or continuity of care documents  Communication with home health agencies or other community services the patient is currently using-[facility]   Transitions of Care Initial Call    Was this an external facility discharge? No Discharge Facility: [facility]    Challenges to be reviewed by the provider   Additional needs identified to be addressed with provider: Yes  home health Sarah    Pt needs to be placed in a SNF as he is unsafe at home. Method of communication with provider : phone      Advance Care Planning:   Does patient have an Advance Directive: Not on file  Was this a readmission? No  Patient stated reason for admission: Fall  Patients top risk factors for readmission: functional physical ability  falls  ineffective coping  multiple health system providers  support system  transportation  caregiver stress  Unsafe at home and needs SNF placement    Care Transition Nurse (CTN) contacted the patient by telephone to perform post hospital discharge assessment. Verified name and  with patient as identifiers. Provided introduction to self, and explanation of the CTN role. CTN reviewed discharge instructions, medical action plan and red flags with patient who verbalized understanding.  Patient given an opportunity to ask questions and does not have any further questions or concerns at this time. Were discharge instructions available to patient? Yes. Reviewed appropriate site of care based on symptoms and resources available to patient including: PCP  Specialist  Urgent care clinics  Home health  When to call [address] The patient agrees to contact the PCP office for questions related to their healthcare. Medication reconciliation was performed with patient, who verbalizes understanding of administration of home medications. Advised obtaining a 90-day supply of all daily and as-needed medications. Covid Risk Education     Educated patient about risk for severe COVID-19 due to risk factors according to CDC guidelines. LPN CC reviewed discharge instructions, medical action plan and red flag symptoms with the patient who verbalized understanding. Discussed COVID vaccination status: Yes. Education provided on COVID-19 vaccination as appropriate. Discussed exposure protocols and quarantine with CDC Guidelines. Patient was given an opportunity to verbalize any questions and concerns and agrees to contact LPN CC or health care provider for questions related to their healthcare. Reviewed and educated patient on any new and changed medications related to discharge diagnosis. Was patient discharged with a pulse oximeter? No Discussed and confirmed pulse oximeter discharge instructions and when to notify provider or seek emergency care. LPN CC provided contact information. Plan for follow-up call in 1-2 days based on severity of symptoms and risk factors. Plan for next call: SNF placement progress     This [facility] spoke with pt and pt's wife and pt stated that he is not doing well. Pt feels unsafe at home due to weakness and inability to perform ADLs. Pt's wife is unable to care for him as well . Writer verified BELGICA with, [facility]. OT, with [facility] has been out to assess pt and also feel pt is unsafe at home.  Pt's insurance company denied cert for SNF placement. Pt has filed an appeal and is awaiting answer. Writer contacted, Lee Porras RN, for advise and was provided number for [facility] Liaison, Shabbir Purdy. Writer contacted [phone] and [address] stated that, CESAR Corbett with [facility], has already been contacted and [facility] is aware of the situation and Ema Velazquez will be out to see pt tomorrow to assist with helping pt get placed in an SNF ASAP. Medication review performed and patient verbalized understanding and is taking all medications as prescribed. Writer will schedule f/u for tomorrow to monitor progress. Advised pt to immediately report any worsening symptoms to the PCP. Patient verbalized understanding and agreed. Mk Del Toro LPN, [facility]  PH: [phone]            Care Transitions 24 Hour Call    Schedule Follow Up Appointment with PCP: Completed  Do you have a copy of your discharge instructions?: Yes  Do you have all of your prescriptions and are they filled?: Yes  Have you been contacted by a [facility] Pharmacist?: No  Have you scheduled your follow up appointment?: Yes  How are you going to get to your appointment?: Other  Do you feel like you have everything you need to keep you well at home?: No  Care Transitions Interventions   Home Care Waiver: Completed Physical Therapy: Completed      Senior Services: Completed     Occupational Therapy: Completed      Palliative Care: Completed            Follow Up  No future appointments.     Mk Del Toro LPN

## 2022-04-26 ENCOUNTER — CARE COORDINATION (OUTPATIENT)
Dept: CASE MANAGEMENT | Age: 82
End: 2022-04-26

## 2022-04-26 NOTE — CARE COORDINATION
[facility] Transitions Follow Up Call    2022    Patient: Ranjana Benson  Patient : 1940   MRN: [id number]  Reason for Admission: Palliative; acute right lower extremity DVT  Discharge Date: 22 RARS: Readmission Risk Score: 19.6 ( )         Spoke with: [address] [place] [address] Transitions Subsequent and Final Call    Subsequent and Final Calls  Care Transitions Interventions  Other Interventions: Follow Up:  Patient's wife reports that patient is not doing well at home and she feels that he was prematurely discharged from the hospital.  [facility] will be visiting today and wife is hopeful they will be able to find a solution for her . She is elderly and has back trouble and is unable to care for him at home. CTN offered contact information for patient advocate and wife declined. CTN will continue with outreach follow up calls and follow up on SW visit and plan. No future appointments.     Marissa Villalobos RN

## 2022-04-26 NOTE — CARE COORDINATION
This [facility] f/u withChelle SW with [facility], to find out where pt was in the process of having insurance approved so that he could be placed in a SNF. Per Leodis Simmonds, pt is still awaiting approval from his insurance company. Chelle feels that pt is hospice appropriate and family will consider this as an option if pt is unable to be placed in a SNF. Chelle expects to hear back from [employer] company by the end of today or maybe tomorrow morning as the pt has added him as an authorized rep. Chelle provided his personal cell ( [phone]) and writer will schedule a f/u for tomorrow to see if pt has been approved for the SNF or if pt decided to enroll in hospice care.    Hugh Johnson LPN, [facility]  [address]: [phone]

## 2022-04-27 ENCOUNTER — CARE COORDINATION (OUTPATIENT)
Dept: CASE MANAGEMENT | Age: 82
End: 2022-04-27

## 2022-04-27 NOTE — CARE COORDINATION
This [address] contacted, CESAR Corbett with [facility] to get an update on pt's status with the appeal with pt's insurance company. Per Meghan Velazquez, the appeal was cancelled. Chelle stated that the insurance co blames [facility] for cancelling the appeal and [facility] is stating that the insurance co canceled. At this point, Meghan Velazquez is scheduled to converse with the family on 04/29/22 to discuss hospice enrollment. Out of pocket cost for pt to stay at [facility] would be $13,000 for one month and pt would have to be evaluated by PT at the facility. Per PT with [facility], pt is unable to get off of the couch and would not be a candidate for PT. Pt is hospice appropriate. Writer will f/u with Chelle on 04/29/22. Chelle will contact writer if he needs any additional info.   Lyndsey Bland LPN, [address]  NaveedOn license of [facility]: [phone]

## 2022-04-29 ENCOUNTER — CARE COORDINATION (OUTPATIENT)
Dept: CASE MANAGEMENT | Age: 82
End: 2022-04-29

## 2022-05-04 ENCOUNTER — CARE COORDINATION (OUTPATIENT)
Dept: CASE MANAGEMENT | Age: 82
End: 2022-05-04

## 2022-05-04 NOTE — CARE COORDINATION
This [facility] spoke with Moni Pearson with [facility] in regards to pt's status. Pt is currently in rehab at [facility] being evaluated to see if he qualifies to be admitted for inpatient rehab. Chelle will update writer on the status as it becomes available. If pt is approved, Hospice will be called in home. Writer resolving episode.    Melody Calabrese LPN, [facility] 30: [phone]

## 2022-05-19 ENCOUNTER — HOSPITAL ENCOUNTER (OUTPATIENT)
Dept: CT IMAGING | Age: 82
Discharge: HOME | End: 2022-05-19
Payer: MEDICARE

## 2022-05-19 DIAGNOSIS — C16.0 GASTROESOPHAGEAL CANCER (HCC): ICD-10-CM

## 2022-05-19 PROCEDURE — 6360000004 HC RX CONTRAST MEDICATION: Performed by: STUDENT IN AN ORGANIZED HEALTH CARE EDUCATION/TRAINING PROGRAM

## 2022-05-19 PROCEDURE — 74177 CT ABD & PELVIS W/CONTRAST: CPT

## 2022-05-19 RX ADMIN — IOPAMIDOL 75 ML: 755 INJECTION, SOLUTION INTRAVENOUS at 13:59

## 2022-05-22 ENCOUNTER — APPOINTMENT (OUTPATIENT)
Dept: GENERAL RADIOLOGY | Age: 82
DRG: 871 | End: 2022-05-22
Payer: MEDICARE

## 2022-05-22 ENCOUNTER — HOSPITAL ENCOUNTER (INPATIENT)
Age: 82
LOS: 6 days | Discharge: NURSING, REHAB OR LONG-TERM CARE | DRG: 871 | End: 2022-05-28
Attending: INTERNAL MEDICINE | Admitting: INTERNAL MEDICINE
Payer: MEDICARE

## 2022-05-22 DIAGNOSIS — C79.9 METASTATIC MALIGNANT NEOPLASM, UNSPECIFIED SITE (HCC): ICD-10-CM

## 2022-05-22 DIAGNOSIS — J18.9 PNEUMONIA OF RIGHT UPPER LOBE DUE TO INFECTIOUS ORGANISM: ICD-10-CM

## 2022-05-22 DIAGNOSIS — U07.1 COVID-19: Primary | ICD-10-CM

## 2022-05-22 PROBLEM — A41.9 SEPSIS (HCC): Status: ACTIVE | Noted: 2022-05-22

## 2022-05-22 LAB
A/G RATIO: 1 (ref 1.1–2.2)
ALBUMIN SERPL-MCNC: 2.8 G/DL (ref 3.4–5)
ALP BLD-CCNC: 140 U/L (ref 40–129)
ALT SERPL-CCNC: 27 U/L (ref 10–40)
ANION GAP SERPL CALCULATED.3IONS-SCNC: 17 MMOL/L (ref 3–16)
APTT: 38.4 SEC (ref 26.2–38.6)
AST SERPL-CCNC: 49 U/L (ref 15–37)
BASOPHILS ABSOLUTE: 0 K/UL (ref 0–0.2)
BASOPHILS RELATIVE PERCENT: 0.4 %
BILIRUB SERPL-MCNC: 0.8 MG/DL (ref 0–1)
BUN BLDV-MCNC: 22 MG/DL (ref 7–20)
CALCIUM SERPL-MCNC: 8.5 MG/DL (ref 8.3–10.6)
CHLORIDE BLD-SCNC: 99 MMOL/L (ref 99–110)
CO2: 15 MMOL/L (ref 21–32)
CREAT SERPL-MCNC: 1 MG/DL (ref 0.8–1.3)
EOSINOPHILS ABSOLUTE: 0 K/UL (ref 0–0.6)
EOSINOPHILS RELATIVE PERCENT: 0 %
GFR AFRICAN AMERICAN: >60
GFR NON-AFRICAN AMERICAN: >60
GLUCOSE BLD-MCNC: 159 MG/DL (ref 70–99)
HCT VFR BLD CALC: 37.6 % (ref 40.5–52.5)
HEMOGLOBIN: 12.1 G/DL (ref 13.5–17.5)
INFLUENZA A: NOT DETECTED
INFLUENZA B: NOT DETECTED
INR BLD: 1.95 (ref 0.88–1.12)
LACTIC ACID, SEPSIS: 2.3 MMOL/L (ref 0.4–1.9)
LACTIC ACID, SEPSIS: 5 MMOL/L (ref 0.4–1.9)
LIPASE: 24 U/L (ref 13–60)
LYMPHOCYTES ABSOLUTE: 0.4 K/UL (ref 1–5.1)
LYMPHOCYTES RELATIVE PERCENT: 4.2 %
MCH RBC QN AUTO: 31 PG (ref 26–34)
MCHC RBC AUTO-ENTMCNC: 32.1 G/DL (ref 31–36)
MCV RBC AUTO: 96.5 FL (ref 80–100)
MONOCYTES ABSOLUTE: 0.7 K/UL (ref 0–1.3)
MONOCYTES RELATIVE PERCENT: 7.7 %
NEUTROPHILS ABSOLUTE: 8.3 K/UL (ref 1.7–7.7)
NEUTROPHILS RELATIVE PERCENT: 87.7 %
PDW BLD-RTO: 17 % (ref 12.4–15.4)
PLATELET # BLD: 135 K/UL (ref 135–450)
PMV BLD AUTO: 7.4 FL (ref 5–10.5)
POTASSIUM SERPL-SCNC: 3.8 MMOL/L (ref 3.5–5.1)
PRO-BNP: 5139 PG/ML (ref 0–449)
PROCALCITONIN: 1.88 NG/ML (ref 0–0.15)
PROTHROMBIN TIME: 22.6 SEC (ref 9.9–12.7)
RBC # BLD: 3.89 M/UL (ref 4.2–5.9)
SARS-COV-2 RNA, RT PCR: DETECTED
SODIUM BLD-SCNC: 131 MMOL/L (ref 136–145)
TOTAL PROTEIN: 5.7 G/DL (ref 6.4–8.2)
TROPONIN: 0.02 NG/ML
URIC ACID, SERUM: 3.6 MG/DL (ref 3.5–7.2)
WBC # BLD: 9.4 K/UL (ref 4–11)

## 2022-05-22 PROCEDURE — 2580000003 HC RX 258: Performed by: PHYSICIAN ASSISTANT

## 2022-05-22 PROCEDURE — 87636 SARSCOV2 & INF A&B AMP PRB: CPT

## 2022-05-22 PROCEDURE — 85025 COMPLETE CBC W/AUTO DIFF WBC: CPT

## 2022-05-22 PROCEDURE — 71045 X-RAY EXAM CHEST 1 VIEW: CPT

## 2022-05-22 PROCEDURE — 84550 ASSAY OF BLOOD/URIC ACID: CPT

## 2022-05-22 PROCEDURE — 84484 ASSAY OF TROPONIN QUANT: CPT

## 2022-05-22 PROCEDURE — 6360000002 HC RX W HCPCS: Performed by: PHYSICIAN ASSISTANT

## 2022-05-22 PROCEDURE — 80053 COMPREHEN METABOLIC PANEL: CPT

## 2022-05-22 PROCEDURE — 83690 ASSAY OF LIPASE: CPT

## 2022-05-22 PROCEDURE — 93005 ELECTROCARDIOGRAM TRACING: CPT

## 2022-05-22 PROCEDURE — 85610 PROTHROMBIN TIME: CPT

## 2022-05-22 PROCEDURE — 85730 THROMBOPLASTIN TIME PARTIAL: CPT

## 2022-05-22 PROCEDURE — 83880 ASSAY OF NATRIURETIC PEPTIDE: CPT

## 2022-05-22 PROCEDURE — 6370000000 HC RX 637 (ALT 250 FOR IP): Performed by: PHYSICIAN ASSISTANT

## 2022-05-22 PROCEDURE — 36415 COLL VENOUS BLD VENIPUNCTURE: CPT

## 2022-05-22 PROCEDURE — 87040 BLOOD CULTURE FOR BACTERIA: CPT

## 2022-05-22 PROCEDURE — 99285 EMERGENCY DEPT VISIT HI MDM: CPT

## 2022-05-22 PROCEDURE — 2060000000 HC ICU INTERMEDIATE R&B

## 2022-05-22 PROCEDURE — 83605 ASSAY OF LACTIC ACID: CPT

## 2022-05-22 PROCEDURE — 84145 PROCALCITONIN (PCT): CPT

## 2022-05-22 RX ORDER — PROMETHAZINE HYDROCHLORIDE 25 MG/1
12.5 TABLET ORAL EVERY 6 HOURS PRN
Status: DISCONTINUED | OUTPATIENT
Start: 2022-05-22 | End: 2022-05-28 | Stop reason: HOSPADM

## 2022-05-22 RX ORDER — MAGNESIUM SULFATE IN WATER 40 MG/ML
2000 INJECTION, SOLUTION INTRAVENOUS PRN
Status: DISCONTINUED | OUTPATIENT
Start: 2022-05-22 | End: 2022-05-28 | Stop reason: HOSPADM

## 2022-05-22 RX ORDER — PANTOPRAZOLE SODIUM 40 MG/1
40 TABLET, DELAYED RELEASE ORAL
Status: DISCONTINUED | OUTPATIENT
Start: 2022-05-23 | End: 2022-05-28 | Stop reason: HOSPADM

## 2022-05-22 RX ORDER — LISINOPRIL 5 MG/1
5 TABLET ORAL DAILY
Status: DISCONTINUED | OUTPATIENT
Start: 2022-05-23 | End: 2022-05-28 | Stop reason: HOSPADM

## 2022-05-22 RX ORDER — ACETAMINOPHEN 325 MG/1
650 TABLET ORAL ONCE
Status: COMPLETED | OUTPATIENT
Start: 2022-05-22 | End: 2022-05-22

## 2022-05-22 RX ORDER — POTASSIUM CHLORIDE 7.45 MG/ML
10 INJECTION INTRAVENOUS PRN
Status: DISCONTINUED | OUTPATIENT
Start: 2022-05-22 | End: 2022-05-28 | Stop reason: HOSPADM

## 2022-05-22 RX ORDER — ONDANSETRON 2 MG/ML
4 INJECTION INTRAMUSCULAR; INTRAVENOUS EVERY 6 HOURS PRN
Status: DISCONTINUED | OUTPATIENT
Start: 2022-05-22 | End: 2022-05-23

## 2022-05-22 RX ORDER — ATENOLOL 50 MG/1
50 TABLET ORAL DAILY
Status: DISCONTINUED | OUTPATIENT
Start: 2022-05-23 | End: 2022-05-28 | Stop reason: HOSPADM

## 2022-05-22 RX ORDER — ATORVASTATIN CALCIUM 20 MG/1
20 TABLET, FILM COATED ORAL NIGHTLY
Status: DISCONTINUED | OUTPATIENT
Start: 2022-05-22 | End: 2022-05-28 | Stop reason: HOSPADM

## 2022-05-22 RX ORDER — SODIUM CHLORIDE 0.9 % (FLUSH) 0.9 %
10 SYRINGE (ML) INJECTION PRN
Status: DISCONTINUED | OUTPATIENT
Start: 2022-05-22 | End: 2022-05-28 | Stop reason: HOSPADM

## 2022-05-22 RX ORDER — POTASSIUM CHLORIDE 20 MEQ/1
40 TABLET, EXTENDED RELEASE ORAL PRN
Status: DISCONTINUED | OUTPATIENT
Start: 2022-05-22 | End: 2022-05-28 | Stop reason: HOSPADM

## 2022-05-22 RX ORDER — SODIUM CHLORIDE 9 MG/ML
INJECTION, SOLUTION INTRAVENOUS CONTINUOUS
Status: DISCONTINUED | OUTPATIENT
Start: 2022-05-22 | End: 2022-05-24

## 2022-05-22 RX ORDER — SODIUM CHLORIDE 0.9 % (FLUSH) 0.9 %
10 SYRINGE (ML) INJECTION EVERY 12 HOURS SCHEDULED
Status: DISCONTINUED | OUTPATIENT
Start: 2022-05-22 | End: 2022-05-28 | Stop reason: HOSPADM

## 2022-05-22 RX ORDER — SODIUM CHLORIDE 9 MG/ML
INJECTION, SOLUTION INTRAVENOUS PRN
Status: DISCONTINUED | OUTPATIENT
Start: 2022-05-22 | End: 2022-05-28 | Stop reason: HOSPADM

## 2022-05-22 RX ORDER — ACETAMINOPHEN 650 MG/1
650 SUPPOSITORY RECTAL EVERY 6 HOURS PRN
Status: DISCONTINUED | OUTPATIENT
Start: 2022-05-22 | End: 2022-05-28 | Stop reason: HOSPADM

## 2022-05-22 RX ORDER — ZOLPIDEM TARTRATE 5 MG/1
2.5 TABLET ORAL NIGHTLY PRN
Status: DISCONTINUED | OUTPATIENT
Start: 2022-05-22 | End: 2022-05-28 | Stop reason: HOSPADM

## 2022-05-22 RX ORDER — ACETAMINOPHEN 325 MG/1
650 TABLET ORAL EVERY 6 HOURS PRN
Status: DISCONTINUED | OUTPATIENT
Start: 2022-05-22 | End: 2022-05-28 | Stop reason: HOSPADM

## 2022-05-22 RX ADMIN — ACETAMINOPHEN 325MG 650 MG: 325 TABLET ORAL at 13:39

## 2022-05-22 RX ADMIN — AZITHROMYCIN MONOHYDRATE 500 MG: 500 INJECTION, POWDER, LYOPHILIZED, FOR SOLUTION INTRAVENOUS at 15:55

## 2022-05-22 RX ADMIN — Medication 1000 MG: at 15:52

## 2022-05-22 ASSESSMENT — ENCOUNTER SYMPTOMS
SHORTNESS OF BREATH: 1
ABDOMINAL PAIN: 0
COUGH: 0
DIARRHEA: 1
RHINORRHEA: 0
NAUSEA: 0
VOMITING: 0
WHEEZING: 0

## 2022-05-22 ASSESSMENT — LIFESTYLE VARIABLES: HOW OFTEN DO YOU HAVE A DRINK CONTAINING ALCOHOL: NEVER

## 2022-05-22 ASSESSMENT — PAIN SCALES - GENERAL: PAINLEVEL_OUTOF10: 0

## 2022-05-22 NOTE — CONSULTS
MD Nikos Dumont MD Lorenso Lao, MD                Office: [phone]                      Fax: [phone]               [email or web address]                   Nephrology consult received. -- full consult report will follow. Patient was seen and examined   Discussed with patient, referring Dr Irineo Lewis. Thank you for allowing me to participate in this patient's care. Please do not hesitate to contact us anytime. We will follow along with you. Chan Dwyer MD  [facility] [address] [place] [address]   [phone] or [employer]    =====================================================           Principal Problem:    Sepsis [facility])  Resolved Problems:    * No resolved hospital problems. *          Labs reviewed by me     CBC: Recent Labs     05/22/22  1335   WBC 9.4   HGB 12.1*   HCT 37.6*   MCV 96.5        BMP:   Recent Labs     05/22/22  1335   *   K 3.8   CL 99   CO2 15*   BUN 22*   CREATININE 1.0     Magnesium:   Lab Results   Component Value Date    MG 1.90 02/02/2022    MG 1.90 01/16/2018    MG 2.10 01/15/2018       Arterial Blood Gasses  No results for input(s): PH, PCO2, PO2 in the last 72 hours. Invalid input(s): Stas Morales    UA:No results for input(s): NITRITE, COLORU, PHUR, LABCAST, WBCUA, RBCUA, MUCUS, TRICHOMONAS, YEAST, BACTERIA, CLARITYU, SPECGRAV, LEUKOCYTESUR, UROBILINOGEN, BILIRUBINUR, BLOODU, GLUCOSEU, AMORPHOUS in the last 72 hours.     Invalid input(s): Jordin Dimas    LIVER PROFILE:   Recent Labs     05/22/22  1335   AST 49*   ALT 27   LIPASE 24.0   BILITOT 0.8   ALKPHOS 140*     PT/INR:    Lab Results   Component Value Date    PROTIME 22.6 05/22/2022    PROTIME 13.1 02/15/2022    PROTIME 13.2 02/01/2022    INR 1.95 05/22/2022    INR 1.15 02/15/2022    INR 1.16 02/01/2022     PTT:    Lab Results   Component Value Date    APTT 38.4 05/22/2022    APTT 68.7 04/14/2022    APTT 51.1 04/14/2022     PORTER:  No results found for: ANATITER, PORTER        RADIOLOGY:    XR CHEST PORTABLE    Result Date: 5/22/2022  EXAMINATION: ONE XRAY VIEW OF THE CHEST 5/22/2022 1:16 pm COMPARISON: February 1, 2022 HISTORY: ORDERING SYSTEM PROVIDED HISTORY: SOB TECHNOLOGIST PROVIDED HISTORY: Reason for exam:->SOB Reason for Exam: Fatigue ([facility] from home d/t weakness and frequent falls x 2 days. per EMS, also states some confusion. ) FINDINGS: The cardiomediastinal silhouette is mildly enlarged, stable. There is a right-sided port with distal tip at the cavoatrial junction. Patchy airspace disease involving the parahilar region of the right upper lobe is concerning for pneumonia. Left lung is clear. No pleural effusion or pneumothorax. 1. Suspected right upper lobe pneumonia. Radiographic follow-up recommended to ensure resolution. 2. Mild cardiomegaly, stable. New right-sided port with distal tip at the cavoatrial junction. CT CHEST ABDOMEN PELVIS W CONTRAST    Result Date: 5/19/2022  EXAM: CT CHEST ABDOMEN AND PELVIS INDICATION: Restaging. 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 COMPARISON: CT of the chest, abdomen, and pelvis 2/1/2022 and MRI of the right hip 3/31/2022. TECHNIQUE: Axial CT imaging obtained through the chest, abdomen and pelvis. Axial images and multiplanar reformatted images were reviewed. Up-to-date CT equipment and radiation dose reduction techniques were employed. IV Contrast: 80 mL [id number]. Oral Contrast: Yes. CT CHEST: Lungs and Pleura: Left upper lung pulmonary nodule axial image 18 has decreased in size compared to prior exam. Right upper lobe nodule axial image 26 has markedly decreased in size. Bilobed nodule in the anterior left upper lobe axial image 28 appears unchanged. Dominant nodule in the right lower lobe superior segment has nearly completely resolved. The central nodule along the posterior aspect of the inferior left hilum has significantly decreased in size.  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 Representative nodule which is new and image 58 of series 601 measures 10 mm. Multiple nodules in the left lung base have markedly decreased in size. Representative nodule in the left lower lobe image 87 measures 13 x 15 mm, previously measuring 16 x 15 mm. No pleural effusion. Mediastinum: Markedly improved nodular thickening along the esophagus. A subcarinal lymph node and gastroesophageal lymph nodes are markedly decreased in size. Lymph node on image 58 in the inferior mediastinum measures 16 x 11 mm, previously this measured 33 x 19 mm. Left paratracheal and right paratracheal lymph nodes decreased in size compared to prior exam. Lower Neck and Chest Wall: No axillary lymphadenopathy. No supraclavicular lymphadenopathy. Thyroid gland is unremarkable. Bones: No suspicious osseous lesion identified. CT ABDOMEN AND PELVIS: Upper Abdomen: Numerous hepatic metastasis which appear less distinct on the current exam. Lesion in the right lobe of liver image 95 measures 23 x 20 mm, present measuring 30 x 22 mm. Lesion in the left lobe of liver image 98 measures 26 x 19 mm, previously measuring 29 x 19 mm. Lesion in the inferior right lobe liver image 103 measures 24 x 21 mm, previously measuring 19 x 17 mm. No definite new liver lesion clearly identified. Spleen is unchanged. Pancreas is unremarkable. Mixed response of upper abdominal lymphadenopathy. There is a retrocaval lymph node axial image 117 which has increased in size showing 31 x 15 mm, previously measuring 30 x 9 mm. Aortocaval lymph node axial image 117 measures 20 x 14 mm, previously measuring 23 x 16 mm. Previous portal caval lymph node is no longer clearly measurable. A left paraceliac lymph node has decreased in size image 112 measuring 10 x 12 mm, previously measuring 20 x 28 mm.  Additional retroperitoneal lymph nodes appear similar in size or slightly decreased. No pancreatic abnormality. Retroperitoneum: No hydronephrosis. Stable adrenal glands. Bowel and Peritoneum: Nonobstructive bowel gas pattern. No free air. No significant free fluid. Extensive sigmoid diverticulosis. Extensive vascular calcifications of the aorta. Pelvis: No pelvic lymphadenopathy identified. Bladder is unremarkable. Prostate is mildly enlarged with calcifications. Bones: The lytic lesion in the right acetabulum is difficult to visualize. There is a small incomplete fracture along the roof of the acetabulum suggesting a pathologic fracture. Clinical correlation recommended. No new osseous lesion clearly identified. CHEST: 1. 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 2. Numerous previously identified nodules in the lungs bilaterally have decreased in size in the interval. Some of the nodules are no longer clearly visualized. The mediastinal lymphadenopathy has markedly decreased in the interval compatible with response to therapy. No progressive lymphadenopathy identified. Overall mixed response with new nodularity in the superior segment left lower lobe. 3. Marked improvement in nodular thickening of the mid and lower esophagus compared to previous exam. ABDOMEN/PELVIS: 1. New incomplete pathologic fracture along the roof of the right acetabulum. Correlate clinically. 2. Extensive hepatic metastatic disease with many of the nodules measuring stable or slightly decreased in size. A few nodules measure slightly larger. 3. Next response of upper abdominal lymphadenopathy with a few lymph nodes measuring slightly larger and others measuring decreased in size in comparison to prior exam. 4. Extensive diverticulosis without diverticulitis. 5. No new osseous lesion identified. Imaging Results.   Chest X Ray reviwed by me

## 2022-05-22 NOTE — ED PROVIDER NOTES
[address]        Pt Name: Akil Duran  MRN: [id number]  [place] 1940  Date of evaluation: 5/22/2022  Provider: Makayla Hernandes PA-C  PCP: Flor Alfaro MD  Note Started: 1:06 PM EDT       RODOLFO. I have evaluated this patient. My supervising physician was available for consultation. CHIEF COMPLAINT       Chief Complaint   Patient presents with    Fatigue     [facility] from home d/t weakness and frequent falls x 2 days. per EMS, also states some confusion. HISTORY OF PRESENT ILLNESS   (Location, Timing/Onset, Context/Setting, Quality, Duration, Modifying Factors, Severity, Associated Signs and Symptoms)  Note limiting factors. Chief Complaint: Weakness     Sandro Anaya is a 78 y.o. male who presents for evaluation of generalized weakness and fatigue that started yesterday. Patient also report some diarrhea that started today, nonbloody. No abdominal pain nausea or vomiting. He states he does feel little short of breath with exertion but denies cough congestion runny nose. No chest pain. No dizziness/lightheadedness, focal weakness, visual disturbances or syncope. He denies falls, despite chief complaint. No urinary complaints. No known sick contacts with similar symptoms. He has no other complaints or concerns at this time. Nursing Notes were all reviewed and agreed with or any disagreements were addressed in the HPI. REVIEW OF SYSTEMS    (2-9 systems for level 4, 10 or more for level 5)     Review of Systems   Constitutional: Positive for fatigue. Negative for appetite change, chills and fever. HENT: Negative for congestion and rhinorrhea. Eyes: Negative for visual disturbance. Respiratory: Positive for shortness of breath. Negative for cough and wheezing. Cardiovascular: Negative for chest pain. Gastrointestinal: Positive for diarrhea. Negative for abdominal pain, nausea and vomiting. Genitourinary: Negative for difficulty urinating, dysuria and hematuria. Musculoskeletal: Negative for neck pain and neck stiffness. Skin: Negative for rash. Neurological: Positive for weakness. Negative for dizziness, syncope, light-headedness and headaches. Positives and Pertinent negatives as per HPI. Except as noted above in the ROS, all other systems were reviewed and negative.        PAST MEDICAL HISTORY     Past Medical History:   Diagnosis Date    Allergic rhinitis 8/17/2009    Anxiety state 9/6/2007    Overview:  ICD-10 Transition    Atrial fibrillation ([address]     CAD (coronary artery disease)     Cataract 12/7/2007    Overview:  ICD-10 Transition    Chronic ischemic heart disease 9/24/2008    Esophageal reflux 9/6/2007    Essential hypertension 12/7/2007    Overview:  ICD-10 Transition    Glaucoma     left eye    Influenza 12/06/2017    Insomnia 9/24/2008    Other and unspecified hyperlipidemia 9/24/2008         SURGICAL HISTORY     Past Surgical History:   Procedure Laterality Date    CHOLECYSTECTOMY, LAPAROSCOPIC  01/17/2018    LAPAROSCOPIC CHOLECYSTECTOMY WITH CHOLANGIOGRAM    COLONOSCOPY      total of 3, first one polyps, 2nd and 3rd no polyps    COLONOSCOPY N/A 2/2/2022    COLONOSCOPY POLYPECTOMY SNARE/COLD BIOPSY performed by [facility] at [facility]      Dr Vanessa Weston ERCP  01/16/2018    EYE SURGERY      bilateral cataracts removed, \"floaters\"    HERNIA REPAIR      bilateral inguinal hernia repairs    IR PORT PLACEMENT EQUAL OR GREATER THAN 5 YEARS  2/15/2022    IR PORT PLACEMENT EQUAL OR GREATER THAN 5 YEARS [facility]20[facility]ED[facility]    UPPER GASTROINTESTINAL ENDOSCOPY N/A 2/2/2022    [facility]       Previous Medications    ACETAMINOPHEN (TYLENOL) 325 MG TABLET    Take 2 tablets by mouth every 4 hours as needed for Pain or Fever    ALPHAGAN P 0.1 % SOLN    Place 1 drop into the left eye 3 times daily     APIXABAN (ELIQUIS) 5 MG TABS TABLET    Take 1 tablet by mouth 2 times daily    ATENOLOL (TENORMIN) 25 MG TABLET    Take 50 mg by mouth daily     CHOLECALCIFEROL (VITAMIN D3) 125 MCG (5000 UT) TABS    Take by mouth    CHOLECALCIFEROL (VITAMIN D3) 50 MCG (2000 UT) CAPS    Take by mouth    DORZOLAMIDE (TRUSOPT) 2 % OPHTHALMIC SOLUTION    Place 1 drop into the right eye 3 times daily     FERROUS SULFATE (IRON 325) 325 (65 FE) MG TABLET    Take 325 mg by mouth daily (with breakfast)     LISINOPRIL (PRINIVIL;ZESTRIL) 5 MG TABLET    Take 5 mg by mouth daily     LORATADINE (CLARITIN) 10 MG TABLET    Take 10 mg by mouth as needed     ONDANSETRON (ZOFRAN) 4 MG TABLET    Take 1 tablet by mouth every 4 hours as needed for Nausea or Vomiting    PANTOPRAZOLE (PROTONIX) 40 MG TABLET    Take 1 tablet by mouth daily    SIMVASTATIN (ZOCOR) 40 MG TABLET    Take 40 mg by mouth nightly     VITAMIN C (ASCORBIC ACID) 500 MG TABLET    Take 500 mg by mouth daily    ZOLPIDEM (AMBIEN) 5 MG TABLET    2.5 mg. ALLERGIES     Patient has no known allergies.     FAMILYHISTORY       Family History   Problem Relation Age of Onset    Cancer Mother     Dementia Mother     Stroke Father           SOCIAL HISTORY       Social History     Tobacco Use    Smoking status: Former Smoker     Packs/day: 1.00     Years: 35.00     Pack years: 35.00     Types: Cigarettes     Quit date: 1988     Years since quittin.7    Smokeless tobacco: Never Used   Substance Use Topics    Alcohol use: Yes     Comment: beer occasionally    Drug use: No       SCREENINGS    Louis Coma Scale  Eye Opening: Spontaneous  Best Verbal Response: Oriented  Best Motor Response: Obeys commands  Lane Coma Scale Score: 15        PHYSICAL EXAM    (up to 7 for level 4, 8 or more for level 5)     ED Triage Vitals   BP Temp Temp Source Pulse Resp SpO2 Height Weight   05/22/22 1303 05/22/22 1301 05/22/22 1301 05/22/22 1301 05/22/22 1301 05/22/22 1301 -- 05/22/22 1301   113/63 101 °F (38.3 °C) Oral 102 23 99 %  135 lb (61.2 kg)       Physical Exam  Vitals and nursing note reviewed. Constitutional:       Appearance: He is well-developed. He is not ill-appearing or diaphoretic. HENT:      Head: Normocephalic and atraumatic. Right Ear: External ear normal.      Left Ear: External ear normal.      Nose: Nose normal.      Mouth/Throat:      Mouth: Mucous membranes are moist.      Pharynx: Oropharynx is clear. Eyes:      General:         Right eye: No discharge. Left eye: No discharge. Conjunctiva/sclera: Conjunctivae normal.      Pupils: Pupils are equal, round, and reactive to light. Cardiovascular:      Rate and Rhythm: Tachycardia present. Rhythm irregular. Heart sounds: Normal heart sounds. Pulmonary:      Effort: Pulmonary effort is normal. No respiratory distress. Breath sounds: Normal breath sounds. Chest:      Chest wall: No tenderness. Abdominal:      General: There is no distension. Palpations: Abdomen is soft. Tenderness: There is no abdominal tenderness. Musculoskeletal:         General: Normal range of motion. Cervical back: Normal range of motion and neck supple. Skin:     General: Skin is warm and dry. Neurological:      Mental Status: He is alert and oriented to person, place, and time. Mental status is at baseline. GCS: GCS eye subscore is 4. GCS verbal subscore is 5. GCS motor subscore is 6. Cranial Nerves: Cranial nerves are intact.    Psychiatric:         Behavior: Behavior normal.         DIAGNOSTIC RESULTS   LABS:    Labs Reviewed   COVID-19 & INFLUENZA COMBO - Abnormal; Notable for the following components:       Result Value    SARS-CoV-2 RNA, RT PCR DETECTED (*)     All other components within normal limits   CBC WITH AUTO DIFFERENTIAL - Abnormal; Notable for the following components:    RBC 3.89 (*)     Hemoglobin 12.1 (*)     Hematocrit 37.6 (*)     RDW 17.0 (*)     Neutrophils Absolute 8.3 (*)     Lymphocytes Absolute 0.4 (*)     All other components within normal limits   COMPREHENSIVE METABOLIC PANEL - Abnormal; Notable for the following components:    Sodium 131 (*)     CO2 15 (*)     Anion Gap 17 (*)     Glucose 159 (*)     BUN 22 (*)     Total Protein 5.7 (*)     Albumin 2.8 (*)     Albumin/Globulin Ratio 1.0 (*)     Alkaline Phosphatase 140 (*)     AST 49 (*)     All other components within normal limits   TROPONIN - Abnormal; Notable for the following components:    Troponin 0.02 (*)     All other components within normal limits   BRAIN NATRIURETIC PEPTIDE - Abnormal; Notable for the following components:    Pro-BNP 5,139 (*)     All other components within normal limits   PROTIME-INR - Abnormal; Notable for the following components:    Protime 22.6 (*)     INR 1.95 (*)     All other components within normal limits   LACTATE, SEPSIS - Abnormal; Notable for the following components:    Lactic Acid, Sepsis 5.0 (*)     All other components within normal limits    Narrative:     CALL  [place]  [facility] tel. [phone],  Chemistry results called to and read back by [facility], 05/22/2022  14:52, by [facility]   CULTURE, BLOOD 1   CULTURE, BLOOD 2   LIPASE   APTT   URINALYSIS WITH REFLEX TO CULTURE   LACTATE, SEPSIS       When ordered only abnormal lab results are displayed. All other labs were within normal range or not returned as of this dictation. EKG: When ordered, EKG's are interpreted by the Emergency Department Physician in the absence of a cardiologist.  Please see their note for interpretation of EKG.     RADIOLOGY:   Non-plain film images such as CT, Ultrasound and MRI are read by the radiologist. Plain radiographic images are visualized and preliminarily interpreted by the ED Provider with the below findings:        Interpretation per the Radiologist below, if available at the time of this note:    XR CHEST PORTABLE   Final Result   1. Suspected right upper lobe pneumonia. Radiographic follow-up recommended   to ensure resolution. 2. Mild cardiomegaly, stable. New right-sided port with distal tip at the   cavoatrial junction. CT CHEST ABDOMEN PELVIS W CONTRAST    Result Date: 5/19/2022  EXAM: CT CHEST ABDOMEN AND PELVIS INDICATION: Restaging. 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 COMPARISON: CT of the chest, abdomen, and pelvis 2/1/2022 and MRI of the right hip 3/31/2022. TECHNIQUE: Axial CT imaging obtained through the chest, abdomen and pelvis. Axial images and multiplanar reformatted images were reviewed. Up-to-date CT equipment and radiation dose reduction techniques were employed. IV Contrast: 80 mL [id number]. Oral Contrast: Yes. CT CHEST: Lungs and Pleura: Left upper lung pulmonary nodule axial image 18 has decreased in size compared to prior exam. Right upper lobe nodule axial image 26 has markedly decreased in size. Bilobed nodule in the anterior left upper lobe axial image 28 appears unchanged. Dominant nodule in the right lower lobe superior segment has nearly completely resolved. The central nodule along the posterior aspect of the inferior left hilum has significantly decreased in size. 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 Representative nodule which is new and image 58 of series 601 measures 10 mm. Multiple nodules in the left lung base have markedly decreased in size. Representative nodule in the left lower lobe image 87 measures 13 x 15 mm, previously measuring 16 x 15 mm. No pleural effusion. Mediastinum: Markedly improved nodular thickening along the esophagus. A subcarinal lymph node and gastroesophageal lymph nodes are markedly decreased in size.  Lymph node on image 58 in the inferior mediastinum measures 16 x 11 mm, previously this measured 33 x 19 mm. Left paratracheal and right paratracheal lymph nodes decreased in size compared to prior exam. Lower Neck and Chest Wall: No axillary lymphadenopathy. No supraclavicular lymphadenopathy. Thyroid gland is unremarkable. Bones: No suspicious osseous lesion identified. CT ABDOMEN AND PELVIS: Upper Abdomen: Numerous hepatic metastasis which appear less distinct on the current exam. Lesion in the right lobe of liver image 95 measures 23 x 20 mm, present measuring 30 x 22 mm. Lesion in the left lobe of liver image 98 measures 26 x 19 mm, previously measuring 29 x 19 mm. Lesion in the inferior right lobe liver image 103 measures 24 x 21 mm, previously measuring 19 x 17 mm. No definite new liver lesion clearly identified. Spleen is unchanged. Pancreas is unremarkable. Mixed response of upper abdominal lymphadenopathy. There is a retrocaval lymph node axial image 117 which has increased in size showing 31 x 15 mm, previously measuring 30 x 9 mm. Aortocaval lymph node axial image 117 measures 20 x 14 mm, previously measuring 23 x 16 mm. Previous portal caval lymph node is no longer clearly measurable. A left paraceliac lymph node has decreased in size image 112 measuring 10 x 12 mm, previously measuring 20 x 28 mm. Additional retroperitoneal lymph nodes appear similar in size or slightly decreased. No pancreatic abnormality. Retroperitoneum: No hydronephrosis. Stable adrenal glands. Bowel and Peritoneum: Nonobstructive bowel gas pattern. No free air. No significant free fluid. Extensive sigmoid diverticulosis. Extensive vascular calcifications of the aorta. Pelvis: No pelvic lymphadenopathy identified. Bladder is unremarkable. Prostate is mildly enlarged with calcifications. Bones: The lytic lesion in the right acetabulum is difficult to visualize. There is a small incomplete fracture along the roof of the acetabulum suggesting a pathologic fracture. Clinical correlation recommended. No new osseous lesion clearly identified. CHEST: 1. 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 2. Numerous previously identified nodules in the lungs bilaterally have decreased in size in the interval. Some of the nodules are no longer clearly visualized. The mediastinal lymphadenopathy has markedly decreased in the interval compatible with response to therapy. No progressive lymphadenopathy identified. Overall mixed response with new nodularity in the superior segment left lower lobe. 3. Marked improvement in nodular thickening of the mid and lower esophagus compared to previous exam. ABDOMEN/PELVIS: 1. New incomplete pathologic fracture along the roof of the right acetabulum. Correlate clinically. 2. Extensive hepatic metastatic disease with many of the nodules measuring stable or slightly decreased in size. A few nodules measure slightly larger. 3. Next response of upper abdominal lymphadenopathy with a few lymph nodes measuring slightly larger and others measuring decreased in size in comparison to prior exam. 4. Extensive diverticulosis without diverticulitis. 5. No new osseous lesion identified. PROCEDURES   Unless otherwise noted below, none     Procedures    CRITICAL CARE TIME   There was a high probability of life-threatening clinical deterioration in the patient's condition requiring my urgent intervention. I personally saw the patient and independently provided 37 minutes of non-concurrent critical care out of the total shared critical care time provided, excluding separately reportable procedures.        CONSULTS:  None      EMERGENCY DEPARTMENT COURSE and DIFFERENTIAL DIAGNOSIS/MDM:   Vitals:    Vitals:    05/22/22 1330 05/22/22 1345 05/22/22 1400 05/22/22 1415   BP: 114/61 101/61 105/62 (!) 109/57   Pulse:       Resp:       Temp:       TempSrc:       SpO2:       Weight:           Patient was given the following medications:  Medications   azithromycin (ZITHROMAX) 500 mg in D5W 250ml Vial Mate (has no administration in time range)   cefTRIAXone (ROCEPHIN) 1000 mg in sterile water 10 mL IV syringe (has no administration in time range)   acetaminophen (TYLENOL) tablet 650 mg (650 mg Oral Given 5/22/22 4077)         Is this patient to be included in the SEP-1 Core Measure due to severe sepsis or septic shock? No   Exclusion criteria - the patient is NOT to be included for SEP-1 Core Measure due to:  May have criteria for sepsis, but does not meet criteria for severe sepsis or septic shock    Patient presents for evaluation of weakness and fatigue. On exam, he is resting comfortably in bed no acute distress and nontoxic. He is answering all questions appropriately, alert and oriented x3. GCS 15. Cranial nerves II through XII are intact. He is slightly tachycardic, A. fib RVR. Febrile 101. Vitals otherwise stable. Lungs are clear to auscultation bilaterally, chest is nontender and abdomen is benign. Patient was given Tylenol as antipyretic and will be reevaluated. Please see attending note for EKG interpretation. CBC and CMP are unremarkable. No leukocytosis. Lipase 24. Troponin 0.02. BNP 5139. Coags are within normal limits. He has right upper lobe consolidation noted on chest x-ray concerning for pneumonia. Started on azithromycin and Rocephin. Flu swabs are negative, however, COVID swab was positive. I believe he warrants admission for further evaluation management of pneumonia, COVID as well as generalized weakness fatigue. Hospitalist resume care the patient this time. Patient was informed and agreeable. He is stable for admission. FINAL IMPRESSION      1. COVID-19    2. Pneumonia of right upper lobe due to infectious organism          DISPOSITION/PLAN   DISPOSITION Decision To Admit 05/22/2022 03:16:04 PM      PATIENT REFERRED TO:  No follow-up provider specified.     DISCHARGE MEDICATIONS:  New Prescriptions    No medications on file       DISCONTINUED MEDICATIONS:  Discontinued Medications    No medications on file              (Please note that portions of this note were completed with a voice recognition program.  Efforts were made to edit the dictations but occasionally words are mis-transcribed.)    Tena Paz PA-C (electronically signed)           Ben Aguirre PA-C  05/22/22 0345

## 2022-05-22 NOTE — ED NOTES
Report given to [facility] nurse. No further questions at this time.       Yajaira Marie RN  05/22/22 8342

## 2022-05-22 NOTE — CONSULTS
MD Marcelo Collier MD Gloria Blew, MD               Office: [phone]                      Fax: [phone]             [facility]                   NEPHROLOGY INITIAL CONSULT NOTE:     PATIENT NAME: Nazia Kebede  : 1940  MRN: [id number]  REASON FOR CONSULT: For evaluation and management of hyponatremia. (My recommendations will be communicated by way of shared medical record.)  Ordered By    2022  3:28 PM Herminia Edwards MD          RECOMMENDATIONS:   - NS at 100 cc/h  - check osm. ,lytes,   - COVID mx   - IV abx for PNA  - daily renal panel   - monitor PVR w/ bladder scan for stallings insertion need. - at higher risk for decompensation, needing closer monitoring. D/C plan from renal stand point:  - expect ~ 3 days    - d/w pt, his wife, referring Dr Karlo Angeles:       Admitted for:  Sepsis ([facility] [A41.9]  Pneumonia of right upper lobe due to infectious organism [J18.9]  COVID-19 [U07.1]      Hyponatremia - chronic - worsening  - BL Na -low 130s   -: Etiology - likely hypovolemia + underlying mild SIADH type effect w/ PNA , COVID-19 w/ hypotension    Associated problems:   - Volume status: hypo-volemic  : BP: hypotension  - renal fx WNL  - Electrolytes: K: WNL  - Acid-Base: acidosis - high AGMA w/ lactic acidosis   - Anemia: of chronic disease - mild        Other major problems: Management per primary and other consulting teams.   //   Hospital Problems           Last Modified POA    * (Principal) Sepsis ([facility] 2022 Yes        : other supportive care :   - Check daily renal function panel with electrolytes-phosphorus  - Strict monitoring of I/Os, daily weight  - Renal feeds/diet  - Current medications reviewed. Please refer to the orders. High Complexity. Multiple complex problems. Discussed with patient and treatment team-    Time spent > 30 (~35) minutes.    Thank you for allowing me to participate in this patient's care. Please do not hesitate to contact me anytime. We will follow along with you. Yadira Young MD,  [facility] [address] [place]: [phone] or [email or web address]  Fax: [phone]        =======================================================================================   =======================================================================================      CHIEF COMPLAINT:   Chief Complaint   Patient presents with    Fatigue     [facility] from home d/t weakness and frequent falls x 2 days. per EMS, also states some confusion. History Obtained From:  patient, spouse + treatment team + Electronic Medical Records    HPI: Mr. Mary Flores is a 78 y.o. male with significant past medical history as below:   Past Medical History:   Diagnosis Date    Allergic rhinitis 8/17/2009    Anxiety state 9/6/2007    Overview:  ICD-10 Transition    Atrial fibrillation ([address]     CAD (coronary artery disease)     Cancer (HCC)     gastroesophageal stage 4. 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 per EMS, also states some confusion. )    Admitted with Sepsis ([address] [A41.9]  Pneumonia of right upper lobe due to infectious organism [J18.9]  COVID-19 [U07.1]   Found to have  hyponatremia , so we are called for that. No current active complaints. Patient denied chest pain / dizziness/lightheadedness/syncope/ SOB / leg edema.  *  Regarding: hyponatremia  · Duration: chronic   · Location: kidneys  · Severity: Severe   · Timing: continous  · Context (ex: related to condition):  , refer to my assessment / impression. · Modifying factors (ex: medications, interventions):  , refer to my plan / recommendation. · Associated signs & symptoms (ex: edema, SOB): As mentioned above in CC and HPI      Past medical, Surgical, Social, Family medical history reviewed by me. PAST MEDICAL HISTORY:   Past Medical History:   Diagnosis Date    Allergic rhinitis 8/17/2009    Anxiety state 9/6/2007    Overview:  ICD-10 Transition    Atrial fibrillation ([address]     CAD (coronary artery disease)     Cancer (HCC)     gastroesophageal stage 4.  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    Esophageal reflux 9/6/2007    Essential hypertension 12/7/2007    Overview:  ICD-10 Transition    Glaucoma     left eye    Influenza 12/06/2017    Insomnia 9/24/2008    Other and unspecified hyperlipidemia 9/24/2008     PAST SURGICAL HISTORY:   Past Surgical History:   Procedure Laterality Date    CHOLECYSTECTOMY, LAPAROSCOPIC  01/17/2018    LAPAROSCOPIC CHOLECYSTECTOMY WITH CHOLANGIOGRAM    COLONOSCOPY      total of 3, first one polyps, 2nd and 3rd no polyps    COLONOSCOPY N/A 2/2/2022    COLONOSCOPY POLYPECTOMY SNARE/COLD BIOPSY performed by Kaila Akhtar [facility] at [facility]      Dr Thoams Castelan    ERCP  01/16/2018    EYE SURGERY      bilateral cataracts removed, \"floaters\"    HERNIA REPAIR      bilateral inguinal hernia repairs    IR PORT PLACEMENT EQUAL OR GREATER THAN 5 YEARS  2/15/2022    IR PORT PLACEMENT EQUAL OR GREATER THAN 5 YEARS 2/15/2022 Ramu Amaral MD [facility] SPECIAL PROCEDURES    UPPER GASTROINTESTINAL ENDOSCOPY N/A 2/2/2022    EGD BIOPSY performed by Kaila Akhtar MD at [address] [place] [address] HISTORY:   Family History   Problem Relation Age of Onset   [place] Cancer Mother     Dementia Mother     Stroke Father      SOCIAL HISTORY:   Social History     Socioeconomic History    Marital status:      Spouse name: None    Number of children: None    Years of education: None    Highest education level: None   Occupational History    None   Tobacco Use    Smoking status: Former Smoker     Packs/day: 1.00     Years: 35.00     Pack years: 35.00     Types: Cigarettes     Quit date: 1988     Years since quittin.7    Smokeless tobacco: Never Used   Substance and Sexual Activity    Alcohol use: Yes     Comment: beer occasionally    Drug use: No    Sexual activity: None   Other Topics Concern    None   Social History Narrative    None     Social Determinants of Health     Financial Resource Strain:     Difficulty of Paying Living Expenses: Not on file   Food Insecurity:     Worried About Running Out of Food in the Last Year: Not on file    Stephanie of Food in the Last Year: Not on file   Transportation Needs:     Lack of Transportation (Medical): Not on file    Lack of Transportation (Non-Medical): Not on file   Physical Activity:     Days of Exercise per Week: Not on file    Minutes of Exercise per Session: Not on file   Stress:     Feeling of Stress : Not on file   Social Connections:     Frequency of Communication with Friends and Family: Not on file    Frequency of Social Gatherings with Friends and Family: Not on file    Attends [employer] Services: Not on file    Active Member of [address] [place] [address] [employer] or Organizations: Not on file    Attends Club or Organization Meetings: Not on file    Marital Status: Not on file   Intimate Partner Violence:     Fear of Current or Ex-Partner: Not on file    Emotionally Abused: Not on file    Physically Abused: Not on file    Sexually Abused: Not on file   Housing Stability:     Unable to Pay for Housing in the Last Year: Not on file    Number of [place] in the Last Year: Not on file    Unstable Housing in the Last Year: Not on file         MEDICATIONS: reviewed by me.    Medications Prior to Admission:  No current facility-administered medications on file prior to encounter. Current Outpatient Medications on File Prior to Encounter   Medication Sig Dispense Refill    apixaban (ELIQUIS) 5 MG TABS tablet Take 1 tablet by mouth 2 times daily 60 tablet 1    ferrous sulfate (IRON 325) 325 (65 Fe) MG tablet Take 325 mg by mouth daily (with breakfast)       Cholecalciferol (VITAMIN D3) 50 MCG (2000 UT) CAPS Take by mouth      vitamin C (ASCORBIC ACID) 500 MG tablet Take 500 mg by mouth daily      Cholecalciferol (VITAMIN D3) 125 MCG (5000 UT) TABS Take by mouth      pantoprazole (PROTONIX) 40 MG tablet Take 1 tablet by mouth daily 30 tablet 0    loratadine (CLARITIN) 10 MG tablet Take 10 mg by mouth as needed       ondansetron (ZOFRAN) 4 MG tablet Take 1 tablet by mouth every 4 hours as needed for Nausea or Vomiting 15 tablet 0    acetaminophen (TYLENOL) 325 MG tablet Take 2 tablets by mouth every 4 hours as needed for Pain or Fever 120 tablet 3    atenolol (TENORMIN) 25 MG tablet Take 50 mg by mouth daily       ALPHAGAN P 0.1 % SOLN Place 1 drop into the left eye 3 times daily       dorzolamide (TRUSOPT) 2 % ophthalmic solution Place 1 drop into the right eye 3 times daily       lisinopril (PRINIVIL;ZESTRIL) 5 MG tablet Take 5 mg by mouth daily       simvastatin (ZOCOR) 40 MG tablet Take 40 mg by mouth nightly       zolpidem (AMBIEN) 5 MG tablet 2.5 mg.            Current Facility-Administered Medications:     azithromycin (ZITHROMAX) 500 mg in D5W 250ml Vial Mate, 500 mg, IntraVENous, Q24H, [address] [facility] [address] [place], [place], Last Rate: 250 mL/hr at 05/22/22 1555, 500 mg at 05/22/22 1555    cefTRIAXone (ROCEPHIN) 1000 mg in sterile water 10 mL IV syringe, 1,000 mg, IntraVENous, Q24H, Brandon Caldwell PA-C, 1,000 mg at 05/22/22 1552      Allergies reviewed by me: Patient has no known allergies. REVIEW OF SYSTEMS:  As mentioned in chief complaint and HPI , Subjective   All other 10-point review of systems: negative. =======================================================================================     PHYSICAL EXAM:  Recent vital signs and recent I/Os reviewed by me. Wt Readings from Last 3 Encounters:   05/22/22 135 lb (61.2 kg)   04/15/22 124 lb 11.2 oz (56.6 kg)   02/15/22 121 lb 4.1 oz (55 kg)     BP Readings from Last 3 Encounters:   05/22/22 100/66   04/22/22 121/77   03/02/22 (!) 144/71     Patient Vitals for the past 24 hrs:   BP Temp Temp src Pulse Resp SpO2 Weight   05/22/22 1700 100/66 96.4 °F (35.8 °C) Temporal 89 19 95 % --   05/22/22 1543 97/74 -- -- 89 23 96 % --   05/22/22 1530 104/74 -- -- -- -- -- --   05/22/22 1515 (!) 103/53 -- -- -- -- -- --   05/22/22 1500 (!) 91/58 -- -- -- -- -- --   05/22/22 1445 102/70 -- -- -- -- -- --   05/22/22 1430 97/69 -- -- -- -- -- --   05/22/22 1415 (!) 109/57 -- -- -- -- -- --   05/22/22 1400 105/62 -- -- -- -- -- --   05/22/22 1345 101/61 -- -- -- -- -- --   05/22/22 1330 114/61 -- -- -- -- -- --   05/22/22 1315 132/63 -- -- -- -- -- --   05/22/22 1303 113/63 -- -- -- -- -- --   05/22/22 1301 -- 101 °F (38.3 °C) Oral 102 23 99 % 135 lb (61.2 kg)     No intake or output data in the 24 hours ending 05/22/22 1751      Physical Exam  Vitals reviewed. Constitutional:       General: He is not in acute distress. Appearance: Normal appearance. He is ill-appearing. HENT:      Head: Normocephalic and atraumatic. Right Ear: External ear normal.      Left Ear: External ear normal.      Nose: Nose normal.      Mouth/Throat:      Mouth: Mucous membranes are moist. Mucous membranes are not dry. Eyes:      General: No scleral icterus. Conjunctiva/sclera: Conjunctivae normal.   Neck:      Vascular: No JVD. Cardiovascular:      Rate and Rhythm: Normal rate and regular rhythm. Heart sounds: S1 normal and S2 normal.   Pulmonary:      Effort: Pulmonary effort is normal. No respiratory distress. Breath sounds: Rhonchi present.    Abdominal:      General: Value Date    COLORU Yellow 04/12/2022    PROTEINU TRACE 04/12/2022    PHUR 5.0 04/12/2022    WBCUA 1 04/12/2022    RBCUA 4 04/12/2022    BACTERIA 3+ 01/15/2018    CLARITYU Clear 04/12/2022    SPECGRAV >=1.030 04/12/2022    LEUKOCYTESUR Negative 04/12/2022    UROBILINOGEN 1.0 04/12/2022    BILIRUBINUR SMALL 04/12/2022    BLOODU Negative 04/12/2022    GLUCOSEU Negative 04/12/2022     Microalbumen/Creatinine ratio:  No components found for: RUCREAT  24 Hour Urine for Protein:  No components found for: RAWUPRO, UHRS3, ROHO45OM, UTV3  24 Hour Urine for Creatinine Clearance:  No components found for: CREAT4, UHRS10, UTV10  Urine Toxicology:  No components found for: IAMMENTA, IBARBIT, IBENZO, ICOCAINE, IMARTHC, IOPIATES, IPHENCYC    HgBA1c:  No results found for: LABA1C  RPR:  No results found for: RPR  HIV:  No results found for: HIV  PORTER:  No results found for: ANATITER, PORTER  RF:  No results found for: RF  DSDNA:  No components found for: DNA  AMYLASE:    Lab Results   Component Value Date    AMYLASE 168 01/17/2018     LIPASE:    Lab Results   Component Value Date    LIPASE 24.0 05/22/2022     Fibrinogen Level:  No components found for: FIB       BELOW MENTIONED RADIOLOGY STUDY RESULTS BY ME (AS NEEDED FOR MY EVALUATION AND MANAGEMENT). XR CHEST PORTABLE    Result Date: 5/22/2022  EXAMINATION: ONE XRAY VIEW OF THE CHEST 5/22/2022 1:16 pm COMPARISON: February 1, 2022 HISTORY: ORDERING SYSTEM PROVIDED HISTORY: SOB TECHNOLOGIST PROVIDED HISTORY: Reason for exam:->SOB Reason for Exam: Fatigue ([facility] from home d/t weakness and frequent falls x 2 days. per EMS, also states some confusion. ) FINDINGS: The cardiomediastinal silhouette is mildly enlarged, stable. There is a right-sided port with distal tip at the cavoatrial junction. Patchy airspace disease involving the parahilar region of the right upper lobe is concerning for pneumonia. Left lung is clear. No pleural effusion or pneumothorax.      1. Suspected right upper lobe pneumonia. Radiographic follow-up recommended to ensure resolution. 2. Mild cardiomegaly, stable. New right-sided port with distal tip at the cavoatrial junction. CT CHEST ABDOMEN PELVIS W CONTRAST    Result Date: 5/19/2022  EXAM: CT CHEST ABDOMEN AND PELVIS INDICATION: Restaging. 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 COMPARISON: CT of the chest, abdomen, and pelvis 2/1/2022 and MRI of the right hip 3/31/2022. TECHNIQUE: Axial CT imaging obtained through the chest, abdomen and pelvis. Axial images and multiplanar reformatted images were reviewed. Up-to-date CT equipment and radiation dose reduction techniques were employed. IV Contrast: 80 mL [id number]. Oral Contrast: Yes. CT CHEST: Lungs and Pleura: Left upper lung pulmonary nodule axial image 18 has decreased in size compared to prior exam. Right upper lobe nodule axial image 26 has markedly decreased in size. Bilobed nodule in the anterior left upper lobe axial image 28 appears unchanged. Dominant nodule in the right lower lobe superior segment has nearly completely resolved. The central nodule along the posterior aspect of the inferior left hilum has significantly decreased in size. 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 Representative nodule which is new and image 58 of series 601 measures 10 mm. Multiple nodules in the left lung base have markedly decreased in size. Representative nodule in the left lower lobe image 87 measures 13 x 15 mm, previously measuring 16 x 15 mm. No pleural effusion. Mediastinum: Markedly improved nodular thickening along the esophagus. A subcarinal lymph node and gastroesophageal lymph nodes are markedly decreased in size. Lymph node on image 58 in the inferior mediastinum measures 16 x 11 mm, previously this measured 33 x 19 mm.  Left paratracheal and right paratracheal lymph nodes decreased in size compared to prior exam. Lower Neck and Chest Wall: No axillary lymphadenopathy. No supraclavicular lymphadenopathy. Thyroid gland is unremarkable. Bones: No suspicious osseous lesion identified. CT ABDOMEN AND PELVIS: Upper Abdomen: Numerous hepatic metastasis which appear less distinct on the current exam. Lesion in the right lobe of liver image 95 measures 23 x 20 mm, present measuring 30 x 22 mm. Lesion in the left lobe of liver image 98 measures 26 x 19 mm, previously measuring 29 x 19 mm. Lesion in the inferior right lobe liver image 103 measures 24 x 21 mm, previously measuring 19 x 17 mm. No definite new liver lesion clearly identified. Spleen is unchanged. Pancreas is unremarkable. Mixed response of upper abdominal lymphadenopathy. There is a retrocaval lymph node axial image 117 which has increased in size showing 31 x 15 mm, previously measuring 30 x 9 mm. Aortocaval lymph node axial image 117 measures 20 x 14 mm, previously measuring 23 x 16 mm. Previous portal caval lymph node is no longer clearly measurable. A left paraceliac lymph node has decreased in size image 112 measuring 10 x 12 mm, previously measuring 20 x 28 mm. Additional retroperitoneal lymph nodes appear similar in size or slightly decreased. No pancreatic abnormality. Retroperitoneum: No hydronephrosis. Stable adrenal glands. Bowel and Peritoneum: Nonobstructive bowel gas pattern. No free air. No significant free fluid. Extensive sigmoid diverticulosis. Extensive vascular calcifications of the aorta. Pelvis: No pelvic lymphadenopathy identified. Bladder is unremarkable. Prostate is mildly enlarged with calcifications. Bones: The lytic lesion in the right acetabulum is difficult to visualize. There is a small incomplete fracture along the roof of the acetabulum suggesting a pathologic fracture. Clinical correlation recommended. No new osseous lesion clearly identified.      CHEST: 1. 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 2. Numerous previously identified nodules in the lungs bilaterally have decreased in size in the interval. Some of the nodules are no longer clearly visualized. The mediastinal lymphadenopathy has markedly decreased in the interval compatible with response to therapy. No progressive lymphadenopathy identified. Overall mixed response with new nodularity in the superior segment left lower lobe. 3. Marked improvement in nodular thickening of the mid and lower esophagus compared to previous exam. ABDOMEN/PELVIS: 1. New incomplete pathologic fracture along the roof of the right acetabulum. Correlate clinically. 2. Extensive hepatic metastatic disease with many of the nodules measuring stable or slightly decreased in size. A few nodules measure slightly larger. 3. Next response of upper abdominal lymphadenopathy with a few lymph nodes measuring slightly larger and others measuring decreased in size in comparison to prior exam. 4. Extensive diverticulosis without diverticulitis. 5. No new osseous lesion identified.

## 2022-05-23 LAB
ALBUMIN SERPL-MCNC: 2.5 G/DL (ref 3.4–5)
ANION GAP SERPL CALCULATED.3IONS-SCNC: 11 MMOL/L (ref 3–16)
BASOPHILS ABSOLUTE: 0 K/UL (ref 0–0.2)
BASOPHILS RELATIVE PERCENT: 0.3 %
BUN BLDV-MCNC: 23 MG/DL (ref 7–20)
CALCIUM SERPL-MCNC: 8.6 MG/DL (ref 8.3–10.6)
CHLORIDE BLD-SCNC: 104 MMOL/L (ref 99–110)
CO2: 19 MMOL/L (ref 21–32)
CREAT SERPL-MCNC: 0.8 MG/DL (ref 0.8–1.3)
EOSINOPHILS ABSOLUTE: 0 K/UL (ref 0–0.6)
EOSINOPHILS RELATIVE PERCENT: 0 %
GFR AFRICAN AMERICAN: >60
GFR NON-AFRICAN AMERICAN: >60
GLUCOSE BLD-MCNC: 104 MG/DL (ref 70–99)
HCT VFR BLD CALC: 36.9 % (ref 40.5–52.5)
HEMOGLOBIN: 12 G/DL (ref 13.5–17.5)
LACTIC ACID, SEPSIS: 1.3 MMOL/L (ref 0.4–1.9)
LACTIC ACID, SEPSIS: 2 MMOL/L (ref 0.4–1.9)
LYMPHOCYTES ABSOLUTE: 1 K/UL (ref 1–5.1)
LYMPHOCYTES RELATIVE PERCENT: 11.7 %
MCH RBC QN AUTO: 30.4 PG (ref 26–34)
MCHC RBC AUTO-ENTMCNC: 32.5 G/DL (ref 31–36)
MCV RBC AUTO: 93.6 FL (ref 80–100)
MONOCYTES ABSOLUTE: 0.7 K/UL (ref 0–1.3)
MONOCYTES RELATIVE PERCENT: 8.3 %
NEUTROPHILS ABSOLUTE: 7.1 K/UL (ref 1.7–7.7)
NEUTROPHILS RELATIVE PERCENT: 79.7 %
PDW BLD-RTO: 16.4 % (ref 12.4–15.4)
PHOSPHORUS: 3 MG/DL (ref 2.5–4.9)
PLATELET # BLD: 125 K/UL (ref 135–450)
PMV BLD AUTO: 7.5 FL (ref 5–10.5)
POTASSIUM SERPL-SCNC: 3.9 MMOL/L (ref 3.5–5.1)
RBC # BLD: 3.94 M/UL (ref 4.2–5.9)
SODIUM BLD-SCNC: 134 MMOL/L (ref 136–145)
WBC # BLD: 8.9 K/UL (ref 4–11)

## 2022-05-23 PROCEDURE — 6370000000 HC RX 637 (ALT 250 FOR IP): Performed by: INTERNAL MEDICINE

## 2022-05-23 PROCEDURE — 6360000002 HC RX W HCPCS: Performed by: PHYSICIAN ASSISTANT

## 2022-05-23 PROCEDURE — 80069 RENAL FUNCTION PANEL: CPT

## 2022-05-23 PROCEDURE — 2060000000 HC ICU INTERMEDIATE R&B

## 2022-05-23 PROCEDURE — 36415 COLL VENOUS BLD VENIPUNCTURE: CPT

## 2022-05-23 PROCEDURE — 2580000003 HC RX 258: Performed by: INTERNAL MEDICINE

## 2022-05-23 PROCEDURE — 85025 COMPLETE CBC W/AUTO DIFF WBC: CPT

## 2022-05-23 PROCEDURE — 83605 ASSAY OF LACTIC ACID: CPT

## 2022-05-23 RX ORDER — DORZOLAMIDE HCL 20 MG/ML
1 SOLUTION/ DROPS OPHTHALMIC 3 TIMES DAILY
Status: DISCONTINUED | OUTPATIENT
Start: 2022-05-24 | End: 2022-05-28 | Stop reason: HOSPADM

## 2022-05-23 RX ORDER — DORZOLAMIDE HCL 20 MG/ML
1 SOLUTION/ DROPS OPHTHALMIC 3 TIMES DAILY
Status: DISCONTINUED | OUTPATIENT
Start: 2022-05-23 | End: 2022-05-23

## 2022-05-23 RX ORDER — OXYCODONE AND ACETAMINOPHEN 10; 325 MG/1; MG/1
1 TABLET ORAL EVERY 8 HOURS PRN
Status: ON HOLD | COMMUNITY
End: 2022-05-28 | Stop reason: SDUPTHER

## 2022-05-23 RX ORDER — FERROUS SULFATE 325(65) MG
325 TABLET ORAL
Status: DISCONTINUED | OUTPATIENT
Start: 2022-05-24 | End: 2022-05-28 | Stop reason: HOSPADM

## 2022-05-23 RX ORDER — CETIRIZINE HYDROCHLORIDE 10 MG/1
5 TABLET ORAL DAILY
Status: DISCONTINUED | OUTPATIENT
Start: 2022-05-23 | End: 2022-05-28 | Stop reason: HOSPADM

## 2022-05-23 RX ORDER — OXYCODONE AND ACETAMINOPHEN 10; 325 MG/1; MG/1
1 TABLET ORAL EVERY 8 HOURS PRN
Status: DISCONTINUED | OUTPATIENT
Start: 2022-05-23 | End: 2022-05-28 | Stop reason: HOSPADM

## 2022-05-23 RX ORDER — DOXYCYCLINE HYCLATE 100 MG
100 TABLET ORAL EVERY 12 HOURS SCHEDULED
Status: DISCONTINUED | OUTPATIENT
Start: 2022-05-23 | End: 2022-05-28 | Stop reason: HOSPADM

## 2022-05-23 RX ORDER — BRIMONIDINE TARTRATE 2 MG/ML
1 SOLUTION/ DROPS OPHTHALMIC 3 TIMES DAILY
Status: DISCONTINUED | OUTPATIENT
Start: 2022-05-23 | End: 2022-05-24

## 2022-05-23 RX ADMIN — Medication 10 ML: at 21:22

## 2022-05-23 RX ADMIN — PANTOPRAZOLE SODIUM 40 MG: 40 TABLET, DELAYED RELEASE ORAL at 06:17

## 2022-05-23 RX ADMIN — APIXABAN 5 MG: 5 TABLET, FILM COATED ORAL at 10:13

## 2022-05-23 RX ADMIN — ATENOLOL 50 MG: 50 TABLET ORAL at 10:13

## 2022-05-23 RX ADMIN — LISINOPRIL 5 MG: 5 TABLET ORAL at 10:12

## 2022-05-23 RX ADMIN — ZOLPIDEM TARTRATE 2.5 MG: 5 TABLET ORAL at 21:59

## 2022-05-23 RX ADMIN — Medication 1000 MG: at 16:46

## 2022-05-23 RX ADMIN — APIXABAN 5 MG: 5 TABLET, FILM COATED ORAL at 21:09

## 2022-05-23 RX ADMIN — Medication 10 ML: at 02:10

## 2022-05-23 RX ADMIN — BRIMONIDINE TARTRATE 1 DROP: 2 SOLUTION OPHTHALMIC at 21:17

## 2022-05-23 RX ADMIN — DOXYCYCLINE HYCLATE 100 MG: 100 TABLET, COATED ORAL at 21:16

## 2022-05-23 RX ADMIN — Medication 10 ML: at 10:14

## 2022-05-23 RX ADMIN — ATORVASTATIN CALCIUM 20 MG: 20 TABLET, FILM COATED ORAL at 21:09

## 2022-05-23 RX ADMIN — CETIRIZINE HYDROCHLORIDE 5 MG: 10 TABLET, FILM COATED ORAL at 16:46

## 2022-05-23 ASSESSMENT — PAIN SCALES - GENERAL
PAINLEVEL_OUTOF10: 0
PAINLEVEL_OUTOF10: 0

## 2022-05-23 NOTE — H&P
Hospital Medicine History & Physical      PCP: Bartolo Pepper MD    Date of Admission: 5/22/2022    Chief Complaint: Fatigue    History Of Present Illness:  Patient is a 42-year-old male with past medical history of stage IV metastatic distal esophageal carcinoma, DVT on Eliquis who presents to the hospital for generalized weakness. According to the patient he has been feeling weak for past 3 to 4 days, he also has generalized weakness and has been having difficulty ambulation. Patient otherwise denied chest pain nausea vomiting diarrhea constipation dysuria fevers or chills. Patient was also found positive for COVID-19      Past Medical History:          Diagnosis Date    Allergic rhinitis 8/17/2009    Anxiety state 9/6/2007    Overview:  ICD-10 Transition    Atrial fibrillation ([address]     CAD (coronary artery disease)     Cancer (HCC)     gastroesophageal stage 4.  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    Esophageal reflux 9/6/2007    Essential hypertension 12/7/2007    Overview:  ICD-10 Transition    Glaucoma     left eye    Influenza 12/06/2017    Insomnia 9/24/2008    Other and unspecified hyperlipidemia 9/24/2008       Past Surgical History:          Procedure Laterality Date    CHOLECYSTECTOMY, LAPAROSCOPIC  01/17/2018    LAPAROSCOPIC CHOLECYSTECTOMY WITH CHOLANGIOGRAM    COLONOSCOPY      total of 3, first one polyps, 2nd and 3rd no polyps    COLONOSCOPY N/A 2/2/2022    COLONOSCOPY POLYPECTOMY SNARE/COLD BIOPSY performed by Don Sarabia MD at [facility]      Dr Kirk Clemente    ERCP  01/16/2018    EYE SURGERY      bilateral cataracts removed, \"floaters\"    HERNIA REPAIR      bilateral inguinal hernia repairs    IR PORT PLACEMENT EQUAL OR GREATER THAN 5 YEARS  2/15/2022    IR PORT PLACEMENT EQUAL OR GREATER THAN 5 YEARS 2/15/2022 Priyanka Aguilar MD [facility] SITTING UP IN BED. PT IS WITHOUT DISTRESS.CALL LIGHT IN REACH SPECIAL PROCEDURES    UPPER GASTROINTESTINAL ENDOSCOPY N/A 2/2/2022    EGD BIOPSY performed by Otto Zavala MD at [address]       Medications Prior to Admission:      Prior to Admission medications    Medication Sig Start Date End Date Taking? Authorizing Provider   apixaban (ELIQUIS) 5 MG TABS tablet Take 1 tablet by mouth 2 times daily 4/22/22   Alejandro Krishnamurthy MD   ferrous sulfate (IRON 325) 325 (65 Fe) MG tablet Take 325 mg by mouth daily (with breakfast)     Historical Provider, MD   Cholecalciferol (VITAMIN D3) 50 MCG (2000 UT) CAPS Take by mouth    Historical Provider, MD   vitamin C (ASCORBIC ACID) 500 MG tablet Take 500 mg by mouth daily    Historical Provider, MD   Cholecalciferol (VITAMIN D3) 125 MCG (5000 UT) TABS Take by mouth    Historical Provider, MD   pantoprazole (PROTONIX) 40 MG tablet Take 1 tablet by mouth daily 2/4/22   Alejandro Krishnamurthy MD   loratadine (CLARITIN) 10 MG tablet Take 10 mg by mouth as needed     Historical Provider, MD   ondansetron (ZOFRAN) 4 MG tablet Take 1 tablet by mouth every 4 hours as needed for Nausea or Vomiting 1/19/18   Freeda Osgood, MD   acetaminophen (TYLENOL) 325 MG tablet Take 2 tablets by mouth every 4 hours as needed for Pain or Fever 12/7/17   Pamela Collins MD   atenolol (TENORMIN) 25 MG tablet Take 50 mg by mouth daily  10/15/17   Historical Provider, MD   ALPHAGAN P 0.1 % SOLN Place 1 drop into the left eye 3 times daily  11/18/17   Historical Provider, MD   dorzolamide (TRUSOPT) 2 % ophthalmic solution Place 1 drop into the right eye 3 times daily  3/6/16   Historical Provider, MD   lisinopril (PRINIVIL;ZESTRIL) 5 MG tablet Take 5 mg by mouth daily  10/15/17   Historical Provider, MD   simvastatin (ZOCOR) 40 MG tablet Take 40 mg by mouth nightly  6/1/17   Historical Provider, MD   zolpidem (AMBIEN) 5 MG tablet 2.5 mg.  10/25/16   Historical Provider, MD       Allergies:  Patient has no known allergies.     Social History: TOBACCO:   reports that he quit smoking about 33 years ago. His smoking use included cigarettes. He has a 35.00 pack-year smoking history. He has never used smokeless tobacco.  ETOH:   reports current alcohol use. Family History:       Reviewed in detail and non contributory          Problem Relation Age of Onset    Cancer Mother     Dementia Mother     Stroke Father        REVIEW OF SYSTEMS:   Pertinent positives as noted in the HPI. All other systems reviewed and negative. PHYSICAL EXAM PERFORMED:    /76   Pulse 80   Temp 97 °F (36.1 °C) (Temporal)   Resp 18   Wt 135 lb (61.2 kg)   SpO2 95%   BMI 19.37 kg/m²     General appearance:  No apparent distress, cooperative. HEENT:  Normal cephalic, atraumatic without obvious deformity. Conjunctivae/corneas clear. Neck: Supple, with full range of motion. No cervical lymphadenopathy  Respiratory:  Normal respiratory effort. Clear to auscultation, bilaterally without Rales/Wheezes/Rhonchi. Cardiovascular:  Regular rate and rhythm with normal S1/S2 without murmurs, rubs or gallops. Abdomen: Soft, non-tender, non-distended, normal bowel sounds. Musculoskeletal:  No edema noted bilaterally. No tenderness on palpation   Skin: no rash visible  Neurologic:  Neurologically intact without any focal sensory/motor deficits.   grossly non-focal.  Psychiatric:  Alert and oriented, normal mood  Peripheral Pulses: +2 palpable, equal bilaterally       Labs:     Recent Labs     05/22/22  1335   WBC 9.4   HGB 12.1*   HCT 37.6*        Recent Labs     05/22/22  1335   *   K 3.8   CL 99   CO2 15*   BUN 22*   CREATININE 1.0   CALCIUM 8.5     Recent Labs     05/22/22  1335   AST 49*   ALT 27   BILITOT 0.8   ALKPHOS 140*     Recent Labs     05/22/22  1335   INR 1.95*     Recent Labs     05/22/22  1335   TROPONINI 0.02*       Urinalysis:      Lab Results   Component Value Date    NITRU Negative 04/12/2022    WBCUA 1 04/12/2022    BACTERIA 3+ 01/15/2018 RBCUA 4 04/12/2022    BLOODU Negative 04/12/2022    SPECGRAV >=1.030 04/12/2022    GLUCOSEU Negative 04/12/2022       Radiology:       XR CHEST PORTABLE   Final Result   1. Suspected right upper lobe pneumonia. Radiographic follow-up recommended   to ensure resolution. 2. Mild cardiomegaly, stable. New right-sided port with distal tip at the   cavoatrial junction. Active Hospital Problems    Diagnosis Date Noted    Sepsis [facility]) [A41.9] 05/22/2022     Priority: Medium         Patient is a 57-year-old male with past medical history of stage IV metastatic distal esophageal carcinoma, DVT on Eliquis who presents to the hospital for generalized weakness. According to the patient he has been feeling weak for past 3 to 4 days, he also has generalized weakness and has been having difficulty ambulation. Patient otherwise denied chest pain nausea vomiting diarrhea constipation dysuria fevers or chills. Patient was also found positive for COVID-19    Assessment  Right upper lobe pneumonia, cannot rule out bacterial pneumonia-unspecified organism  COVID-19 infection  Hyponatremia  Lactic acidosis  Elevated troponin likely demand ischemia  Stage IV adenocarcinoma of esophagus  History of DVT on Eliquis    Plan  Start Rocephin, azithromycin  Check procalcitonin  Monitor lactic acid  IV fluid therapy with normal saline  Monitor and replace electrolytes, monitor BMP, consult nephrology  Consult oncology per patient request, patient is on chemotherapy  DVT prophylaxis-on Eliquis  Diet: ADULT DIET; Regular  Code Status: Full Code    PT/OT Eval Status: ordered    Dispo - pending clinical improvement       Trinh Kemp MD    The note was completed using EMR and Dragon dictation system. Every effort was made to ensure accuracy; however, inadvertent computerized transcription errors may be present. Thank you Nilson Ferrer MD for the opportunity to be involved in this patient's care.  If you have any questions or concerns please feel free to contact me at [phone].     Krysta De Paz MD

## 2022-05-23 NOTE — PROGRESS NOTES
[address] [phone] was active with this pt prior to admit. Discharge planner notified. Will follow.

## 2022-05-23 NOTE — PROGRESS NOTES
Jhonny Alma, MD Audrea Bolognese, MD Leona Opitz, MD               Office: [phone]                      Fax: [phone]             [address] [place] [address]                   NEPHROLOGY INPATIENT PROGRESS NOTE:     PATIENT NAME: Jerrod Vang  : 1940  MRN: [id number]       RECOMMENDATIONS:   - NS at 100 cc/h -continue   - COVID mx   - IV abx for PNA  - daily renal panel   - monitor PVR w/ bladder scan for stallings insertion need. - at higher risk for decompensation, needing closer monitoring. D/C plan from renal stand point: in 24 hrs  have d/w pt, his wife,     IMPRESSION:       Admitted for:  Sepsis ([facility] [A41.9]  Pneumonia of right upper lobe due to infectious organism [J18.9]  COVID-19 [U07.1]      Hyponatremia - worsened chronic on admission  Slow improvement    - BL Na -low 130s   -: Etiology - likely hypovolemia + underlying mild SIADH type effect w/ PNA , COVID-19 w/ hypotension    Associated problems:   - Volume status: hypo-volemic  : BP: hypotension  - renal fx WNL  - Electrolytes: K: WNL  - Acid-Base: acidosis - high AGMA w/ lactic acidosis   - Anemia: of chronic disease - mild        Other major problems: Management per primary and other consulting teams.   //   Hospital Problems           Last Modified POA    * (Principal) Sepsis ([facility] 2022 Yes        : other supportive care :   - Check daily renal function panel with electrolytes-phosphorus  - Strict monitoring of I/Os, daily weight  - Renal feeds/diet  - Current medications reviewed. Please refer to the orders. High Complexity. Multiple complex problems. Discussed with patient and treatment team-    Time spent > 30 (~35) minutes. Thank you for allowing me to participate in this patient's care. Please do not hesitate to contact me anytime. We will follow along with you.        Leona Opitz, MD,  [facility] [address] [place] [address]: [phone] or Via [email or web address]  Fax: [phone]        =======================================================================================   =======================================================================================  SUBJECTIVE  Seen resting in bed  No distress  Past medical, Surgical, Social, Family medical history reviewed by me. MEDICATIONS: reviewed by me. Medications Prior to Admission:  No current facility-administered medications on file prior to encounter.      Current Outpatient Medications on File Prior to Encounter   Medication Sig Dispense Refill    apixaban (ELIQUIS) 5 MG TABS tablet Take 1 tablet by mouth 2 times daily 60 tablet 1    ferrous sulfate (IRON 325) 325 (65 Fe) MG tablet Take 325 mg by mouth daily (with breakfast)       Cholecalciferol (VITAMIN D3) 50 MCG (2000 UT) CAPS Take by mouth      vitamin C (ASCORBIC ACID) 500 MG tablet Take 500 mg by mouth daily      Cholecalciferol (VITAMIN D3) 125 MCG (5000 UT) TABS Take by mouth      pantoprazole (PROTONIX) 40 MG tablet Take 1 tablet by mouth daily 30 tablet 0    loratadine (CLARITIN) 10 MG tablet Take 10 mg by mouth as needed       ondansetron (ZOFRAN) 4 MG tablet Take 1 tablet by mouth every 4 hours as needed for Nausea or Vomiting 15 tablet 0    acetaminophen (TYLENOL) 325 MG tablet Take 2 tablets by mouth every 4 hours as needed for Pain or Fever 120 tablet 3    atenolol (TENORMIN) 25 MG tablet Take 50 mg by mouth daily       ALPHAGAN P 0.1 % SOLN Place 1 drop into the left eye 3 times daily       dorzolamide (TRUSOPT) 2 % ophthalmic solution Place 1 drop into the right eye 3 times daily       lisinopril (PRINIVIL;ZESTRIL) 5 MG tablet Take 5 mg by mouth daily       simvastatin (ZOCOR) 40 MG tablet Take 40 mg by mouth nightly       zolpidem (AMBIEN) 5 MG tablet 2.5 mg.            Current Facility-Administered Medications:     azithromycin (ZITHROMAX) 500 mg in D5W 250ml Vial Mate, 500 mg, IntraVENous, Q24H, [place], [place], Last Rate: 250 mL/hr at 05/22/22 1555, 500 mg at 05/22/22 1555    cefTRIAXone (ROCEPHIN) 1000 mg in sterile water 10 mL IV syringe, 1,000 mg, IntraVENous, Q24H, Israel Meigs, PA-C, 1,000 mg at 05/22/22 1552    sodium chloride flush 0.9 % injection 10 mL, 10 mL, IntraVENous, 2 times per day, Jana Sanz MD, 10 mL at 05/23/22 0210    sodium chloride flush 0.9 % injection 10 mL, 10 mL, IntraVENous, PRN, Jana Sanz MD    0.9 % sodium chloride infusion, , IntraVENous, PRN, Jana Sanz MD    potassium chloride (KLOR-CON M) extended release tablet 40 mEq, 40 mEq, Oral, PRN **OR** potassium bicarb-citric acid (EFFER-K) effervescent tablet 40 mEq, 40 mEq, Oral, PRN **OR** potassium chloride 10 mEq/100 mL IVPB (Peripheral Line), 10 mEq, IntraVENous, PRN, Jana Sanz MD    potassium chloride 10 mEq/100 mL IVPB (Peripheral Line), 10 mEq, IntraVENous, PRN, Jana Sanz MD    magnesium sulfate 2000 mg in 50 mL IVPB premix, 2,000 mg, IntraVENous, PRN, Jana Sanz MD    promethazine (PHENERGAN) tablet 12.5 mg, 12.5 mg, Oral, Q6H PRN **OR** ondansetron (ZOFRAN) injection 4 mg, 4 mg, IntraVENous, Q6H PRN, Jana Sanz MD    magnesium hydroxide (MILK OF MAGNESIA) 400 MG/5ML suspension 30 mL, 30 mL, Oral, Daily PRN, Jana Sanz MD    acetaminophen (TYLENOL) tablet 650 mg, 650 mg, Oral, Q6H PRN **OR** acetaminophen (TYLENOL) suppository 650 mg, 650 mg, Rectal, Q6H PRN, Jana Sanz MD    0.9 % sodium chloride infusion, , IntraVENous, Continuous, Blanca Bojorquez MD    apixaban (ELIQUIS) tablet 5 mg, 5 mg, Oral, BID, Jana Sanz MD    atenolol (TENORMIN) tablet 50 mg, 50 mg, Oral, Daily, Jana Sanz MD    lisinopril (PRINIVIL;ZESTRIL) tablet 5 mg, 5 mg, Oral, Daily, Jana Sanz MD    pantoprazole (PROTONIX) tablet 40 mg, 40 mg, Oral, QAM AC, Lazaro Murray MD, 40 mg at 05/23/22 0617    atorvastatin (LIPITOR) tablet 20 mg, 20 mg, Oral, Nightly, Jana Sanz MD    zolpidem (AMBIEN) tablet 2.5 mg, 2.5 mg, Oral, Nightly PRN, Owen Sands MD    REVIEW OF SYSTEMS:  As mentioned in chief complaint and HPI , Subjective        =======================================================================================     PHYSICAL EXAM:  Recent vital signs and recent I/Os reviewed by me. Wt Readings from Last 3 Encounters:   05/23/22 135 lb (61.2 kg)   04/15/22 124 lb 11.2 oz (56.6 kg)   02/15/22 121 lb 4.1 oz (55 kg)     BP Readings from Last 3 Encounters:   05/23/22 107/62   04/22/22 121/77   03/02/22 (!) 144/71     Patient Vitals for the past 24 hrs:   BP Temp Temp src Pulse Resp SpO2 Height Weight   05/23/22 0800 107/62 -- -- 92 -- -- -- --   05/23/22 0400 134/75 97 °F (36.1 °C) Temporal 88 18 96 % -- --   05/23/22 0338 -- -- -- -- -- -- 5' 10\" (1.778 m) 135 lb (61.2 kg)   05/23/22 0000 107/67 97.2 °F (36.2 °C) Temporal 82 16 96 % -- --   05/22/22 2001 102/76 97 °F (36.1 °C) Temporal 80 18 95 % -- --   05/22/22 2000 -- -- -- 77 -- -- -- --   05/22/22 1700 100/66 96.4 °F (35.8 °C) Temporal 89 19 95 % -- --   05/22/22 1543 97/74 -- -- 89 23 96 % -- --   05/22/22 1530 104/74 -- -- -- -- -- -- --   05/22/22 1515 (!) 103/53 -- -- -- -- -- -- --   05/22/22 1500 (!) 91/58 -- -- -- -- -- -- --   05/22/22 1445 102/70 -- -- -- -- -- -- --   05/22/22 1430 97/69 -- -- -- -- -- -- --   05/22/22 1415 (!) 109/57 -- -- -- -- -- -- --   05/22/22 1400 105/62 -- -- -- -- -- -- --   05/22/22 1345 101/61 -- -- -- -- -- -- --   05/22/22 1330 114/61 -- -- -- -- -- -- --   05/22/22 1315 132/63 -- -- -- -- -- -- --   05/22/22 1303 113/63 -- -- -- -- -- -- --   05/22/22 1301 -- 101 °F (38.3 °C) Oral 102 23 99 % -- 135 lb (61.2 kg)       Intake/Output Summary (Last 24 hours) at 5/23/2022 0912  Last data filed at 5/23/2022 0900  Gross per 24 hour   Intake --   Output 700 ml   Net -700 ml         Physical Exam  Vitals reviewed. Constitutional:       General: He is not in acute distress. Appearance: Normal appearance.  He is ill-appearing. HENT:      Head: Normocephalic and atraumatic. Right Ear: External ear normal.      Left Ear: External ear normal.      Nose: Nose normal.      Mouth/Throat:      Mouth: Mucous membranes are moist. Mucous membranes are not dry. Eyes:      General: No scleral icterus. Conjunctiva/sclera: Conjunctivae normal.   Neck:      Vascular: No JVD. Cardiovascular:      Rate and Rhythm: Normal rate and regular rhythm. Heart sounds: S1 normal and S2 normal.   Pulmonary:      Effort: Pulmonary effort is normal. No respiratory distress. Breath sounds: Rhonchi present. Abdominal:      General: Bowel sounds are normal. There is no distension. Musculoskeletal:         General: No swelling or deformity. Cervical back: Normal range of motion and neck supple. Skin:     General: Skin is dry. Coloration: Skin is not jaundiced. Neurological:      Mental Status: He is alert and oriented to person, place, and time. Mental status is at baseline. Psychiatric:         Mood and Affect: Mood normal.         Behavior: Behavior normal.              =======================================================================================     DATA:  Diagnostic tests reviewed by me for today's visit:   (AS NEEDED FOR MY EVALUATION AND MANAGEMENT).        Recent Labs     05/22/22  1335 05/23/22 0442   WBC 9.4 8.9   HCT 37.6* 36.9*    125*     Iron Saturation:  No components found for: PERCENTFE  FERRITIN:    Lab Results   Component Value Date    FERRITIN 29.2 02/01/2022     IRON:    Lab Results   Component Value Date    IRON 27 02/01/2022     TIBC:    Lab Results   Component Value Date    TIBC 267 02/01/2022       Recent Labs     05/22/22  1335 05/23/22  0442   * 134*   K 3.8 3.9   CL 99 104   CO2 15* 19*   BUN 22* 23*   CREATININE 1.0 0.8     Recent Labs     05/22/22  1335 05/23/22  0442   CALCIUM 8.5 8.6   PHOS  --  3.0     No results for input(s): PH, PCO2, PO2 in the last 72 hours.    Invalid input(s): [id number], INSPIREDO2    ABG:  No results found for: PH, PCO2, PO2, HCO3, BE, THGB, TCO2, O2SAT  VBG:  No results found for: PHVEN, [id number], BEVEN, [id number]    LDH:    Lab Results   Component Value Date     02/02/2022     Uric Acid:    Lab Results   Component Value Date    LABURIC 3.6 05/22/2022       PT/INR:    Lab Results   Component Value Date    PROTIME 22.6 05/22/2022    INR 1.95 05/22/2022     Warfarin PT/INR:  No components found for: [place]  PTT:    Lab Results   Component Value Date    APTT 38.4 05/22/2022   [APTT}  Last 3 Troponin:    Lab Results   Component Value Date    TROPONINI 0.02 05/22/2022    TROPONINI 0.03 02/01/2022    TROPONINI <0.01 04/28/2021       U/A:    Lab Results   Component Value Date    COLORU Yellow 04/12/2022    PROTEINU TRACE 04/12/2022    PHUR 5.0 04/12/2022    WBCUA 1 04/12/2022    RBCUA 4 04/12/2022    BACTERIA 3+ 01/15/2018    CLARITYU Clear 04/12/2022    SPECGRAV >=1.030 04/12/2022    LEUKOCYTESUR Negative 04/12/2022    UROBILINOGEN 1.0 04/12/2022    BILIRUBINUR SMALL 04/12/2022    BLOODU Negative 04/12/2022    GLUCOSEU Negative 04/12/2022     Microalbumen/Creatinine ratio:  No components found for: RUCREAT  24 Hour Urine for Protein:  No components found for: RAWUPRO, UHRS3, [id number], UTV3  24 Hour Urine for Creatinine Clearance:  No components found for: CREAT4, UHRS10, UTV10  Urine Toxicology:  No components found for: IAMMENTA, IBARBIT, IBENZO, ICOCAINE, IMARTHC, IOPIATES, IPHENCYC    HgBA1c:  No results found for: LABA1C  RPR:  No results found for: RPR  HIV:  No results found for: HIV  PORTER:  No results found for: ANATITER, PORTER  RF:  No results found for: RF  DSDNA:  No components found for: DNA  AMYLASE:    Lab Results   Component Value Date    AMYLASE 168 01/17/2018     LIPASE:    Lab Results   Component Value Date    LIPASE 24.0 05/22/2022     Fibrinogen Level:  No components found for: FIB       BELOW MENTIONED RADIOLOGY STUDY RESULTS BY ME (AS NEEDED FOR MY EVALUATION AND MANAGEMENT). XR CHEST PORTABLE    Result Date: 5/22/2022  EXAMINATION: ONE XRAY VIEW OF THE CHEST 5/22/2022 1:16 pm COMPARISON: February 1, 2022 HISTORY: ORDERING SYSTEM PROVIDED HISTORY: SOB TECHNOLOGIST PROVIDED HISTORY: Reason for exam:->SOB Reason for Exam: Fatigue ([facility] from home d/t weakness and frequent falls x 2 days. per EMS, also states some confusion. ) FINDINGS: The cardiomediastinal silhouette is mildly enlarged, stable. There is a right-sided port with distal tip at the cavoatrial junction. Patchy airspace disease involving the parahilar region of the right upper lobe is concerning for pneumonia. Left lung is clear. No pleural effusion or pneumothorax. 1. Suspected right upper lobe pneumonia. Radiographic follow-up recommended to ensure resolution. 2. Mild cardiomegaly, stable. New right-sided port with distal tip at the cavoatrial junction. CT CHEST ABDOMEN PELVIS W CONTRAST    Result Date: 5/19/2022  EXAM: CT CHEST ABDOMEN AND PELVIS INDICATION: Restaging. 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 COMPARISON: CT of the chest, abdomen, and pelvis 2/1/2022 and MRI of the right hip 3/31/2022. TECHNIQUE: Axial CT imaging obtained through the chest, abdomen and pelvis. Axial images and multiplanar reformatted images were reviewed. Up-to-date CT equipment and radiation dose reduction techniques were employed. IV Contrast: 80 mL Isovue-370. Oral Contrast: Yes. CT CHEST: Lungs and Pleura: Left upper lung pulmonary nodule axial image 18 has decreased in size compared to prior exam. Right upper lobe nodule axial image 26 has markedly decreased in size. Bilobed nodule in the anterior left upper lobe axial image 28 appears unchanged. Dominant nodule in the right lower lobe superior segment has nearly completely resolved.  The central nodule along the posterior aspect of the inferior left hilum has significantly retroperitoneal lymph nodes appear similar in size or slightly decreased. No pancreatic abnormality. Retroperitoneum: No hydronephrosis. Stable adrenal glands. Bowel and Peritoneum: Nonobstructive bowel gas pattern. No free air. No significant free fluid. Extensive sigmoid diverticulosis. Extensive vascular calcifications of the aorta. Pelvis: No pelvic lymphadenopathy identified. Bladder is unremarkable. Prostate is mildly enlarged with calcifications. Bones: The lytic lesion in the right acetabulum is difficult to visualize. There is a small incomplete fracture along the roof of the acetabulum suggesting a pathologic fracture. Clinical correlation recommended. No new osseous lesion clearly identified. CHEST: 1. 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 2. Numerous previously identified nodules in the lungs bilaterally have decreased in size in the interval. Some of the nodules are no longer clearly visualized. The mediastinal lymphadenopathy has markedly decreased in the interval compatible with response to therapy. No progressive lymphadenopathy identified. Overall mixed response with new nodularity in the superior segment left lower lobe. 3. Marked improvement in nodular thickening of the mid and lower esophagus compared to previous exam. ABDOMEN/PELVIS: 1. New incomplete pathologic fracture along the roof of the right acetabulum. Correlate clinically. 2. Extensive hepatic metastatic disease with many of the nodules measuring stable or slightly decreased in size. A few nodules measure slightly larger. 3. Next response of upper abdominal lymphadenopathy with a few lymph nodes measuring slightly larger and others measuring decreased in size in comparison to prior exam. 4. Extensive diverticulosis without diverticulitis. 5. No new osseous lesion identified.

## 2022-05-23 NOTE — DISCHARGE INSTR - COC
Continuity of Care Form    Patient Name: Fabiana Torres   :  1940  MRN:  [id number]    Admit date:  2022  Discharge date:  ***    Code Status Order: Full Code   Advance Directives:      Admitting Physician:  Jamshid Villarreal MD  PCP: Elisabeth Tobias MD    Discharging Nurse: [facility] Unit/Room#: P3N-0619/1713-67  Discharging Unit Phone Number: ***    Emergency Contact:   Extended Emergency Contact Information  Primary Emergency Contact: Jennifer Model  Address: [place] [address].           [facility], [address] [place] [address].  Home Phone: [phone]  Work Phone: [phone]  Relation: Spouse    Past Surgical History:  Past Surgical History:   Procedure Laterality Date    CHOLECYSTECTOMY, LAPAROSCOPIC  2018    LAPAROSCOPIC CHOLECYSTECTOMY WITH CHOLANGIOGRAM    COLONOSCOPY      total of 3, first one polyps, 2nd and 3rd no polyps    COLONOSCOPY N/A 2022    COLONOSCOPY POLYPECTOMY SNARE/COLD BIOPSY performed by Kirill Crespo MD at [address] [place] [address], [place] [address]      Dr Ernie Vazquez    ERCP  2018    EYE SURGERY      bilateral cataracts removed, \"floaters\"    HERNIA REPAIR      bilateral inguinal hernia repairs    IR PORT PLACEMENT EQUAL OR GREATER THAN 5 YEARS  2/15/2022    IR PORT PLACEMENT EQUAL OR GREATER THAN 5 YEARS 2/15/2022 Concepción Ash MD [facility] SPECIAL PROCEDURES    UPPER GASTROINTESTINAL ENDOSCOPY N/A 2022    EGD BIOPSY performed by Kirill Crespo MD at [address] [place] [address]       Immunization History:   Immunization History   Administered Date(s) Administered    COVID-19, Pfizer Purple top, DILUTE for use, 12+ yrs, 30mcg/0.3mL dose 2021, 2021, 2021    Hc Rx 061 2022, 2022, 04/15/2022, 2022, 2022, 2022, 2022, 2022, 2022, 2022, 2022, 2022, 2022, 2022    Influenza Virus Vaccine 10/15/2017    Pneumococcal Polysaccharide ([id number]) 2012    Tdap (Boostrix, Adacel) 12/06/2017    Zoster Live (Zostavax) 08/17/2009       Active Problems:  Patient Active Problem List   Diagnosis Code    Chronic ischemic heart disease I25.9    Cataract H26.9    Allergic rhinitis J30.9    Essential hypertension I10    Esophageal reflux K21.9    Other and unspecified hyperlipidemia E78.5    Insomnia G47.00    Glaucoma H40.9    Anxiety state F41.1    SVT (supraventricular tachycardia) ([facility]) I47.1    Coronary artery disease involving native coronary artery of native heart without angina pectoris I25.10    Ascending cholangitis K83.09    Acute pancreatitis K85.90    Acute gallstone pancreatitis K85.10    Cholecystitis K81.9    Abnormal LFTs R94.5    Acute GI bleeding K92.2    DVT femoral (deep venous thrombosis) with thrombophlebitis, bilateral ([facility]) I82.413    Severe malnutrition ([facility]) E43    Sepsis ([facility]) A41.9       Isolation/Infection:   Isolation            Droplet Plus          Patient Infection Status       Infection Onset Added Last Indicated Last Indicated By Review Planned Expiration Resolved Resolved By    COVID-19 05/22/22 05/22/22 05/22/22 COVID-19 & Influenza Combo 05/29/22 06/05/22      Resolved    COVID-19 (Rule Out) 05/22/22 05/22/22 05/22/22 COVID-19 & Influenza Combo (Ordered)   05/22/22 Rule-Out Test Resulted    C-diff Rule Out 04/14/22 04/14/22 04/14/22 GI Bacterial Pathogens By PCR (Ordered)   04/14/22 Rule-Out Test Resulted            Nurse Assessment:  Last Vital Signs: /75   Pulse 88   Temp 97 °F (36.1 °C) (Temporal)   Resp 18   Ht 5' 10\" (1.778 m)   Wt 135 lb (61.2 kg)   SpO2 96%   BMI 19.37 kg/m²     Last documented pain score (0-10 scale): Pain Level: 0  Last Weight:   Wt Readings from Last 1 Encounters:   05/23/22 135 lb (61.2 kg)     Mental Status:  oriented and alert    IV Access:  - None    Nursing Mobility/ADLs:  Walking   Assisted  Transfer  Assisted  Bathing  Assisted  Dressing  Assisted  Toileting  Assisted  Feeding  Independent  Med Admin Assisted  Med Delivery   whole    Wound Care Documentation and Therapy:        Elimination:  Continence: Bowel: No  Bladder: Yes  Urinary Catheter: None   Colostomy/Ileostomy/Ileal Conduit: No       Date of Last BM: 5/28/2022    Intake/Output Summary (Last 24 hours) at 5/23/2022 0819  Last data filed at 5/23/2022 0600  Gross per 24 hour   Intake --   Output 500 ml   Net -500 ml     I/O last 3 completed shifts:  In: -   Out: 500 [Urine:500]    Safety Concerns: At Risk for Falls    Impairments/Disabilities:      None    Nutrition Therapy:  Current Nutrition Therapy:   - Oral Diet:  General    Routes of Feeding: Oral  Liquids: Thin Liquids  Daily Fluid Restriction: no  Last Modified Barium Swallow with Video (Video Swallowing Test): not done    Treatments at the Time of Hospital Discharge:   Respiratory Treatments: ***  Oxygen Therapy:  is not on home oxygen therapy. Ventilator:    - No ventilator support    Rehab Therapies: SN,PT,OT  Weight Bearing Status/Restrictions: No weight bearing restrictions  Other Medical Equipment (for information only, NOT a DME order):  walker and bedside commode  Other Treatments: ***    Patient's personal belongings (please select all that are sent with patient):  Glasses, Hearing Aides bilateral    RN SIGNATURE:  Electronically signed by Julia Murray RN on 5/28/22 at 10:46 AM EDT    CASE MANAGEMENT/SOCIAL WORK SECTION    Inpatient Status Date:     Readmission Risk Assessment Score:  Readmission Risk              Risk of Unplanned Readmission:  25           Discharging to Facility/ [address] [place], [place] [address]  Phone: [phone]  Fax: [phone]     / signature: Electronically signed by Julia Murray RN on 5/28/22 at 10:46 AM EDT    PHYSICIAN SECTION    Prognosis: Fair    Condition at Discharge: Stable    Rehab Potential (if transferring to Rehab):  Fair    Recommended Labs or Other Treatments After Discharge:     Physician Certification: I certify the above information and transfer of Cary Hampton  is necessary for the continuing treatment of the diagnosis listed and that he requires [address] for less 30 days.      Update Admission H&P: No change in H&P    PHYSICIAN SIGNATURE:  Electronically signed by Susan Cordoba MD on 5/26/22 at 11:02 AM EDT

## 2022-05-23 NOTE — PROGRESS NOTES
Hospitalist Progress Note      PCP: Damion Abreu MD    Date of Admission: 5/22/2022    Hospital Course:    \"78year-old male  history of stage IV metastatic distal esophageal carcinoma, DVT on Eliquis who presents to the hospital for generalized weakness. According to the patient he has been feeling weak for 3 to 4 days, he also has generalized weakness and has been having difficulty ambulation. Patient was also found positive for COVID-19. \"       Subjective:   He states he feels unwell. He is otherwise comfortable. He is not on oxygen. Medications:  Reviewed    Infusion Medications    sodium chloride      sodium chloride       Scheduled Medications    doxycycline hyclate  100 mg Oral 2 times per day    cefTRIAXone (ROCEPHIN) IV  1,000 mg IntraVENous Q24H    sodium chloride flush  10 mL IntraVENous 2 times per day    apixaban  5 mg Oral BID    atenolol  50 mg Oral Daily    lisinopril  5 mg Oral Daily    pantoprazole  40 mg Oral QAM AC    atorvastatin  20 mg Oral Nightly     PRN Meds: sodium chloride flush, sodium chloride, potassium chloride **OR** potassium alternative oral replacement **OR** potassium chloride, potassium chloride, magnesium sulfate, promethazine **OR** [DISCONTINUED] ondansetron, magnesium hydroxide, acetaminophen **OR** acetaminophen, zolpidem      Intake/Output Summary (Last 24 hours) at 5/23/2022 1428  Last data filed at 5/23/2022 1014  Gross per 24 hour   Intake 10 ml   Output 700 ml   Net -690 ml       Physical Exam Performed:    /78   Pulse 81   Temp 97.1 °F (36.2 °C) (Tympanic)   Resp 18   Ht 5' 10\" (1.778 m)   Wt 135 lb (61.2 kg)   SpO2 99%   BMI 19.37 kg/m²     General appearance: No apparent distress, appears stated age and cooperative. HEENT:MMM  Neck: Supple, with full range of motion. Trachea midline. Respiratory:  Normal respiratory effort.  Diminished at bases  Cardiovascular: Regular rate and rhythm with normal S1/S2 without murmurs,+Port-a-cath. Abdomen: Soft, non-tender, non-distended with normal bowel sounds. Musculoskeletal: No clubbing, cyanosis or edema bilaterally. Skin: Skin color, texture, turgor normal.  No rashes or lesions. Neurologic:  Neurovascularly intact without any focal sensory/motor deficits. grossly non-focal.  Psychiatric: Alert and oriented, thought content appropriate, normal insight      Labs:   Recent Labs     05/22/22  1335 05/23/22  0442   WBC 9.4 8.9   HGB 12.1* 12.0*   HCT 37.6* 36.9*    125*     Recent Labs     05/22/22  1335 05/23/22  0442   * 134*   K 3.8 3.9   CL 99 104   CO2 15* 19*   BUN 22* 23*   CREATININE 1.0 0.8   CALCIUM 8.5 8.6   PHOS  --  3.0     Recent Labs     05/22/22  1335   AST 49*   ALT 27   BILITOT 0.8   ALKPHOS 140*     Recent Labs     05/22/22  1335   INR 1.95*     Recent Labs     05/22/22  1335   TROPONINI 0.02*       Urinalysis:      Lab Results   Component Value Date    NITRU Negative 04/12/2022    WBCUA 1 04/12/2022    BACTERIA 3+ 01/15/2018    RBCUA 4 04/12/2022    BLOODU Negative 04/12/2022    SPECGRAV >=1.030 04/12/2022    GLUCOSEU Negative 04/12/2022       Radiology:  XR CHEST PORTABLE   Final Result   1. Suspected right upper lobe pneumonia. Radiographic follow-up recommended   to ensure resolution. 2. Mild cardiomegaly, stable. New right-sided port with distal tip at the   cavoatrial junction. Assessment/Plan:    Active Hospital Problems    Diagnosis     Sepsis ([facility] [A41.9]      Priority: Medium     Assessment  Right upper lobe pneumonia, cannot rule out bacterial pneumonia-unspecified organism  COVID-19 infection  Hyponatremia  Lactic acidosis  Atrial fibrillation  Elevated troponin likely demand ischemia  Stage IV adenocarcinoma of esophagus  History of DVT on Eliquis  QTC prolongation  Generalized weakness     Plan  -Not requiring oxygen. Thus no steroids or remdesivir needed.  -Procalcitonin elevated.   Continue Rocephin and change azithromycin to doxycycline [QTC prolongation] for bacterial superimposed infection  -Follow-up blood cultures  -Lactic acid trended down to normal limits  -Continue IV fluids  -Nephrology consulted for hyponatremia. Sodium improved. -Atrial fibrillation on telemetry. Patient is already on anticoagulation. Rate controlled. -Oncology consulted per patient request.  Patient is immunocompromised. Plans to continue chemotherapy outpatient. -PT/OT for generalized weakness    DVT Prophylaxis: Eliquis  Diet: ADULT DIET;  Regular  ADULT ORAL NUTRITION SUPPLEMENT; Dinner, Breakfast; Standard High Calorie/High Protein Oral Supplement  Code Status: Full Code    PT/OT Eval Status: As needed    Dispo -Home in 1 to 2 days    Alexandro Myrick MD

## 2022-05-23 NOTE — CARE COORDINATION
Discharge Planning Assessment  Readmission Risk Score - 22%  CESAR discharge planner attempted to speak with patient x2 via telephone due to Covid isolation procedures, but patient reported that he was busy being changed both times. SW called and spoke with patient's Wife, Jadon Mancera (on emergency contact list), to discuss reason for admission, current living situation, and potential needs at the time of discharge. Below is the information that Wife provided in regards to discharge planning:    Demographics/Insurance verified: Yes    Current type of dwellin-story with a basement. 12 steps to get into home. Patient from ECF/SW confirmed with: N/A    Living arrangements: Lives with Wife    Level of function/Support: Patient needs assistance with ADL's due to weakness. Ambulates with a 2-wheel walker. PCP: Dr. Elvin Jean Baptiste    Last Visit to PCP: A year ago    DME: 2-wheel walker, cane shower chair and grab bars. Active with any community resources/agencies/skilled home care: Currently active with [facility]) for [address] [facility]). Medication compliance issues: None    Financial issues that could impact healthcare: Patient has [employer] as health insurance. Discussed with Wife about possible need for skilled nursing facility (SNF) placement upon discharge and limited SNF's who currently take patients who are positive for Covid. SW informed of [employer] who accept Covid positive patients. Wife verbalized understanding and agreed to see what therapies recommend to see what is needed for patient. Tentative discharge plan: Home with [facility] vs SNF    Transportation at the time of discharge:   Possibly friends or non-emergency medical transport, based upon discharge placement.         Chad DENNY, TINY, [facility]  [employer] Work -   [phone]    Electronically signed by EDUIN Briggs on 2022 at 9:11 AM

## 2022-05-23 NOTE — CONSULTS
Oncology Hematology Care   CONSULT NOTE    5/23/2022 10:05 AM    Patient Information: Theodore Amanda   Date of Admit:  5/22/2022  Primary Care Physician:  Janina Sy MD  Requesting Physician:  Jayla Lewis MD    Reason for consult:   Evaluation and recommendations for gastroesophageal cancer. Chief complaint:    Chief Complaint   Patient presents with    Fatigue     [facility] from home d/t weakness and frequent falls x 2 days. per EMS, also states some confusion. History of Present Illness:  Theodore Amanda is a 78 y.o. male on Jayla Lewis MD service who was admitted on 5/22/2022 for COVID-19 and pneumonia. He presented to the ER with complaints of weakness for 3 days. Chest X-ray showed right upper lobe pneumonia. He was started on antibiotics. He is a patient of Dr. 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 He is on active treatment with FOLFOX plus trastuzumab and pembrolizumab. Last treatment of trastuzumab and pembrolizumab only was given on 3/29/2022. He was previously hospitalized in April 2022 for acute DVT of RLE and was started on Eliquis. He was discharged to rehab facility. He was last seen in office on 5/12/2022 with significant weight loss and it was decided to obtain restaging imaging before proceeding with treatment. He had a CT CAP on 5/19/2022 that showed a mixed response to treatment. He is feeling okay this morning. He denies any shortness of breath or chest pain. Past Medical History:     has a past medical history of Allergic rhinitis, Anxiety state, Atrial fibrillation ([address] CAD (coronary artery disease), Cancer ([address] Cataract, Chronic ischemic heart disease, Esophageal reflux, Essential hypertension, Glaucoma, Influenza, Insomnia, and Other and unspecified hyperlipidemia.      Past Surgical History:    Past Surgical History:   Procedure Laterality Date    CHOLECYSTECTOMY, LAPAROSCOPIC  01/17/2018    LAPAROSCOPIC CHOLECYSTECTOMY WITH CHOLANGIOGRAM    COLONOSCOPY      total of 3, first one polyps, 2nd and 3rd no polyps    COLONOSCOPY N/A 2/2/2022    COLONOSCOPY POLYPECTOMY SNARE/COLD BIOPSY performed by Eliane Olivarez MD at [facility]      Dr Vonda Dorantes    ERCP  01/16/2018    EYE SURGERY      bilateral cataracts removed, \"floaters\"    HERNIA REPAIR      bilateral inguinal hernia repairs    IR PORT PLACEMENT EQUAL OR GREATER THAN 5 YEARS  2/15/2022    IR PORT PLACEMENT EQUAL OR GREATER THAN 5 YEARS 2/15/2022 Esequiel Amor MD [facility] SPECIAL PROCEDURES    UPPER GASTROINTESTINAL ENDOSCOPY N/A 2/2/2022    EGD BIOPSY performed by Eliane Olivarez MD at [address] [place] [address]        Current Medications:     azithromycin  500 mg IntraVENous Q24H    cefTRIAXone (ROCEPHIN) IV  1,000 mg IntraVENous Q24H    sodium chloride flush  10 mL IntraVENous 2 times per day    apixaban  5 mg Oral BID    atenolol  50 mg Oral Daily    lisinopril  5 mg Oral Daily    pantoprazole  40 mg Oral QAM AC    atorvastatin  20 mg Oral Nightly       Allergies:    No Known Allergies     Social History:    reports that he quit smoking about 33 years ago. His smoking use included cigarettes. He has a 35.00 pack-year smoking history. He has never used smokeless tobacco. He reports current alcohol use. He reports that he does not use drugs. Family History:     family history includes Cancer in his mother; Dementia in his mother; Stroke in his father.      ROS:      Per HPI; otherwise 10 point ROS is negative    PHYSICAL EXAM:    Vitals:  Vitals:    05/23/22 0800   BP: 107/62   Pulse: 92   Resp:    Temp:    SpO2:         Intake/Output Summary (Last 24 hours) at 5/23/2022 1005  Last data filed at 5/23/2022 0900  Gross per 24 hour   Intake --   Output 700 ml   Net -700 ml      Wt Readings from Last 3 Encounters:   05/23/22 135 lb (61.2 kg)   04/15/22 124 lb 11.2 oz (56.6 kg)   02/15/22 121 lb 4.1 oz (55 kg)        General appearance: Appears comfortable. On room air. In COVID isolation. Eyes: Sclera clear, pupils equal  ENT: Moist mucus membranes, no thrush  Neck: Trachea midline, symmetrical  Neurologic: Cranial nerves grossly intact, no motor or speech deficits. Psychiatric: Normal affect. Alert and oriented to time, place and person. DATA:  CBC:   Lab Results   Component Value Date    WBC 8.9 05/23/2022    RBC 3.94 05/23/2022    HGB 12.0 05/23/2022    HCT 36.9 05/23/2022    MCV 93.6 05/23/2022    MCH 30.4 05/23/2022    MCHC 32.5 05/23/2022    RDW 16.4 05/23/2022     05/23/2022    MPV 7.5 05/23/2022     BMP:  Lab Results   Component Value Date     05/23/2022    K 3.9 05/23/2022    K 4.0 04/22/2022     05/23/2022    CO2 19 05/23/2022    BUN 23 05/23/2022    CREATININE 0.8 05/23/2022    CALCIUM 8.6 05/23/2022    GFRAA >60 05/23/2022    GFRAA >60 04/12/2013    LABGLOM >60 05/23/2022    GLUCOSE 104 05/23/2022     Magnesium:   Lab Results   Component Value Date    MG 1.90 02/02/2022    MG 1.90 01/16/2018    MG 2.10 01/15/2018     LIVER PROFILE:   Recent Labs     05/22/22  1335   AST 49*   ALT 27   LIPASE 24.0   BILITOT 0.8   ALKPHOS 140*     PT/INR:    Lab Results   Component Value Date    PROTIME 22.6 05/22/2022    PROTIME 13.1 02/15/2022    PROTIME 13.2 02/01/2022    INR 1.95 05/22/2022    INR 1.15 02/15/2022    INR 1.16 02/01/2022     IMAGING:  Narrative   EXAM: CT CHEST ABDOMEN AND PELVIS       INDICATION: Restaging. 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 COMPARISON: CT of the chest, abdomen, and pelvis 2/1/2022 and MRI of the right hip 3/31/2022. TECHNIQUE: Axial CT imaging obtained through the chest, abdomen and pelvis. Axial images and multiplanar reformatted images were reviewed. Up-to-date CT equipment and radiation dose reduction techniques were employed. IV Contrast: 80 mL Isovue-370. Oral Contrast: Yes. CT CHEST:       Lungs and Pleura: Left upper lung pulmonary nodule axial image 18 has decreased in size compared to prior exam. Right upper lobe nodule axial image 26 has markedly decreased in size. Bilobed nodule in the anterior left upper lobe axial image 28 appears    unchanged. Dominant nodule in the right lower lobe superior segment has nearly completely resolved. The central nodule along the posterior aspect of the inferior left hilum has significantly decreased in size. There are new branching nodular opacities in the left    lower lobe suggesting possible lymphangitic carcinomatosis or progressive metastatic disease. Representative nodule which is new and image 58 of series 601 measures 10 mm. Multiple nodules in the left lung base have markedly decreased in size. Representative nodule in the left lower lobe image 87 measures 13 x 15 mm, previously measuring 16 x 15 mm. No pleural effusion. Mediastinum: Markedly improved nodular thickening along the esophagus. A subcarinal lymph node and gastroesophageal lymph nodes are markedly decreased in size. Lymph node on image 58 in the inferior mediastinum measures 16 x 11 mm, previously this    measured 33 x 19 mm. Left paratracheal and right paratracheal lymph nodes decreased in size compared to prior exam.       Lower Neck and Chest Wall: No axillary lymphadenopathy. No supraclavicular lymphadenopathy. Thyroid gland is unremarkable. Bones: No suspicious osseous lesion identified. CT ABDOMEN AND PELVIS:       Upper Abdomen: Numerous hepatic metastasis which appear less distinct on the current exam. Lesion in the right lobe of liver image 95 measures 23 x 20 mm, present measuring 30 x 22 mm. Lesion in the left lobe of liver image 98 measures 26 x 19 mm,    previously measuring 29 x 19 mm. Lesion in the inferior right lobe liver image 103 measures 24 x 21 mm, previously measuring 19 x 17 mm.  No mid and lower esophagus compared to previous exam.           ABDOMEN/PELVIS:       1. New incomplete pathologic fracture along the roof of the right acetabulum. Correlate clinically. 2. Extensive hepatic metastatic disease with many of the nodules measuring stable or slightly decreased in size. A few nodules measure slightly larger. 3. Next response of upper abdominal lymphadenopathy with a few lymph nodes measuring slightly larger and others measuring decreased in size in comparison to prior exam.   4. Extensive diverticulosis without diverticulitis. 5. No new osseous lesion identified. IMPRESSION/RECOMMENDATIONS:    Principal Problem:    Sepsis ([address]  Resolved Problems:    * No resolved hospital problems. *       78-year-old male with a history of atrial fibrillation, HTN, GERD, and CAD who has:     1. 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 - Last treatment of trastuzumab and pembrolizumab only was given on 3/29/2022.  - CT CAP on 5/19/2022 showed mixed response to treatment  - will follow-up with Dr. Mary Gusman as outpatient to discuss further treatment plan    2. Right upper lobe pneumonia  - positive for COVID-19 infection  - on Zithromax and rocephin    3. History of DVT  - on Eliquis      This plan was discussed with the patient and he/she verbalized understanding. Thank you for allowing us to participate in the care of this patient. KENDELL Hilario CNP  Oncology Hematology [facility] 13  [phone]    Reviewed labs and scan. Admitted for Covid pneumonia. Radiographic evidence of mixed response of metastatic esophageal cancer.      Nelia Cowan MD

## 2022-05-24 LAB
ALBUMIN SERPL-MCNC: 2.5 G/DL (ref 3.4–5)
ANION GAP SERPL CALCULATED.3IONS-SCNC: 11 MMOL/L (ref 3–16)
BACTERIA: NORMAL /HPF
BILIRUBIN URINE: NEGATIVE
BLOOD, URINE: NEGATIVE
BUN BLDV-MCNC: 21 MG/DL (ref 7–20)
CALCIUM SERPL-MCNC: 8.3 MG/DL (ref 8.3–10.6)
CHLORIDE BLD-SCNC: 104 MMOL/L (ref 99–110)
CHLORIDE URINE RANDOM: 65 MMOL/L
CLARITY: CLEAR
CO2: 21 MMOL/L (ref 21–32)
COLOR: YELLOW
CREAT SERPL-MCNC: 0.9 MG/DL (ref 0.8–1.3)
CREATININE URINE: 91.7 MG/DL (ref 39–259)
EPITHELIAL CELLS, UA: 0 /HPF (ref 0–5)
GFR AFRICAN AMERICAN: >60
GFR NON-AFRICAN AMERICAN: >60
GLUCOSE BLD-MCNC: 86 MG/DL (ref 70–99)
GLUCOSE URINE: NEGATIVE MG/DL
HYALINE CASTS: 1 /LPF (ref 0–8)
KETONES, URINE: ABNORMAL MG/DL
LACTIC ACID, SEPSIS: 1.1 MMOL/L (ref 0.4–1.9)
LEUKOCYTE ESTERASE, URINE: NEGATIVE
MAGNESIUM: 1.8 MG/DL (ref 1.8–2.4)
MICROSCOPIC EXAMINATION: YES
NITRITE, URINE: NEGATIVE
OSMOLALITY URINE: 470 MOSM/KG (ref 390–1070)
PH UA: 6.5 (ref 5–8)
PHOSPHORUS: 2.8 MG/DL (ref 2.5–4.9)
POTASSIUM SERPL-SCNC: 3.4 MMOL/L (ref 3.5–5.1)
POTASSIUM, UR: 25 MMOL/L
PROCALCITONIN: 1.61 NG/ML (ref 0–0.15)
PROTEIN UA: ABNORMAL MG/DL
RBC UA: 1 /HPF (ref 0–4)
SODIUM BLD-SCNC: 136 MMOL/L (ref 136–145)
SODIUM URINE: 82 MMOL/L
SPECIFIC GRAVITY UA: 1.01 (ref 1–1.03)
UREA NITROGEN, UR: 692.4 MG/DL (ref 800–1666)
URINE REFLEX TO CULTURE: ABNORMAL
URINE TYPE: ABNORMAL
UROBILINOGEN, URINE: 1 E.U./DL
WBC UA: 1 /HPF (ref 0–5)

## 2022-05-24 PROCEDURE — 83605 ASSAY OF LACTIC ACID: CPT

## 2022-05-24 PROCEDURE — 84540 ASSAY OF URINE/UREA-N: CPT

## 2022-05-24 PROCEDURE — 83935 ASSAY OF URINE OSMOLALITY: CPT

## 2022-05-24 PROCEDURE — 82570 ASSAY OF URINE CREATININE: CPT

## 2022-05-24 PROCEDURE — 6370000000 HC RX 637 (ALT 250 FOR IP): Performed by: INTERNAL MEDICINE

## 2022-05-24 PROCEDURE — 97530 THERAPEUTIC ACTIVITIES: CPT

## 2022-05-24 PROCEDURE — 84145 PROCALCITONIN (PCT): CPT

## 2022-05-24 PROCEDURE — 81001 URINALYSIS AUTO W/SCOPE: CPT

## 2022-05-24 PROCEDURE — 84300 ASSAY OF URINE SODIUM: CPT

## 2022-05-24 PROCEDURE — 97166 OT EVAL MOD COMPLEX 45 MIN: CPT

## 2022-05-24 PROCEDURE — 84133 ASSAY OF URINE POTASSIUM: CPT

## 2022-05-24 PROCEDURE — 97161 PT EVAL LOW COMPLEX 20 MIN: CPT

## 2022-05-24 PROCEDURE — 80069 RENAL FUNCTION PANEL: CPT

## 2022-05-24 PROCEDURE — 6360000002 HC RX W HCPCS: Performed by: PHYSICIAN ASSISTANT

## 2022-05-24 PROCEDURE — 83735 ASSAY OF MAGNESIUM: CPT

## 2022-05-24 PROCEDURE — 87449 NOS EACH ORGANISM AG IA: CPT

## 2022-05-24 PROCEDURE — 36415 COLL VENOUS BLD VENIPUNCTURE: CPT

## 2022-05-24 PROCEDURE — 97116 GAIT TRAINING THERAPY: CPT

## 2022-05-24 PROCEDURE — 2060000000 HC ICU INTERMEDIATE R&B

## 2022-05-24 PROCEDURE — 82436 ASSAY OF URINE CHLORIDE: CPT

## 2022-05-24 PROCEDURE — 2580000003 HC RX 258: Performed by: INTERNAL MEDICINE

## 2022-05-24 RX ORDER — BRIMONIDINE TARTRATE 2 MG/ML
1 SOLUTION/ DROPS OPHTHALMIC 3 TIMES DAILY
Status: DISCONTINUED | OUTPATIENT
Start: 2022-05-24 | End: 2022-05-28 | Stop reason: HOSPADM

## 2022-05-24 RX ADMIN — APIXABAN 5 MG: 5 TABLET, FILM COATED ORAL at 08:16

## 2022-05-24 RX ADMIN — CETIRIZINE HYDROCHLORIDE 5 MG: 10 TABLET, FILM COATED ORAL at 08:16

## 2022-05-24 RX ADMIN — DORZOLAMIDE HYDROCHLORIDE 1 DROP: 20 SOLUTION/ DROPS OPHTHALMIC at 13:50

## 2022-05-24 RX ADMIN — BRIMONIDINE TARTRATE 1 DROP: 2 SOLUTION OPHTHALMIC at 20:51

## 2022-05-24 RX ADMIN — LISINOPRIL 5 MG: 5 TABLET ORAL at 08:16

## 2022-05-24 RX ADMIN — Medication 10 ML: at 20:50

## 2022-05-24 RX ADMIN — FERROUS SULFATE TAB 325 MG (65 MG ELEMENTAL FE) 325 MG: 325 (65 FE) TAB at 08:16

## 2022-05-24 RX ADMIN — DORZOLAMIDE HYDROCHLORIDE 1 DROP: 20 SOLUTION/ DROPS OPHTHALMIC at 20:51

## 2022-05-24 RX ADMIN — POTASSIUM CHLORIDE 40 MEQ: 20 TABLET, EXTENDED RELEASE ORAL at 12:59

## 2022-05-24 RX ADMIN — Medication 1000 MG: at 15:44

## 2022-05-24 RX ADMIN — ATENOLOL 50 MG: 50 TABLET ORAL at 08:16

## 2022-05-24 RX ADMIN — DOXYCYCLINE HYCLATE 100 MG: 100 TABLET, COATED ORAL at 20:49

## 2022-05-24 RX ADMIN — BRIMONIDINE TARTRATE 1 DROP: 2 SOLUTION OPHTHALMIC at 13:50

## 2022-05-24 RX ADMIN — DOXYCYCLINE HYCLATE 100 MG: 100 TABLET, COATED ORAL at 08:31

## 2022-05-24 RX ADMIN — PANTOPRAZOLE SODIUM 40 MG: 40 TABLET, DELAYED RELEASE ORAL at 06:29

## 2022-05-24 RX ADMIN — DORZOLAMIDE HYDROCHLORIDE 1 DROP: 20 SOLUTION/ DROPS OPHTHALMIC at 10:08

## 2022-05-24 RX ADMIN — Medication 10 ML: at 08:17

## 2022-05-24 RX ADMIN — BRIMONIDINE TARTRATE 1 DROP: 2 SOLUTION OPHTHALMIC at 08:31

## 2022-05-24 RX ADMIN — ATORVASTATIN CALCIUM 20 MG: 20 TABLET, FILM COATED ORAL at 20:49

## 2022-05-24 RX ADMIN — APIXABAN 5 MG: 5 TABLET, FILM COATED ORAL at 20:50

## 2022-05-24 ASSESSMENT — PAIN SCALES - GENERAL: PAINLEVEL_OUTOF10: 0

## 2022-05-24 NOTE — PROGRESS NOTES
[facility] [address]   Phone: [phone]    Physical Therapy    [x] Initial Evaluation            [] Daily Treatment Note         [] Discharge Summary      Patient: Eduar Cox   : 1940   MRN: [id number]   Date of Service:  2022  Admitting Diagnosis: Sepsis [facility])  Current Admission Summary: Pt admitted for sepsis. Currently being treated for PNA, COVID+. Pt with a history of esophogeal cancer  Past Medical History:  has a past medical history of Allergic rhinitis, Anxiety state, Atrial fibrillation ([facility] [address] CAD (coronary artery disease), Cancer ([facility] [address] Cataract, Chronic ischemic heart disease, Esophageal reflux, Essential hypertension, Glaucoma, Influenza, Insomnia, and Other and unspecified hyperlipidemia. Past Surgical History:  has a past surgical history that includes Coronary angioplasty with stent; eye surgery; hernia repair; Colonoscopy; ERCP (2018); Cholecystectomy, laparoscopic (2018); Upper gastrointestinal endoscopy (N/A, 2022); Colonoscopy (N/A, 2022); and IR PORT PLACEMENT > 5 YEARS (2/15/2022). Discharge Recommendations: Eduar Cox scored a 17/24 on the AM-PAC short mobility form. Current research shows that an AM-PAC score of 17 or less is typically not associated with a discharge to the patient's home setting. Based on the patient's AM-PAC score and their current functional mobility deficits, it is recommended that the patient have 3-5 sessions per week of Physical Therapy at d/c to increase the patient's independence. Please see assessment section for further patient specific details. If patient discharges prior to next session this note will serve as a discharge summary. Please see below for the latest assessment towards goals.      DME Required For Discharge: DME to be determined at next level of care  Precautions/Restrictions: high fall risk, up as tolerated  Weight Bearing Restrictions: no restrictions Required Braces/Orthotics: no braces required  Positional Restrictions:no positional restrictions    Pre-Admission Information   Lives With: spouse                  Type of Home: house  Home Layout: two level  Enter thru the basement  Home Access: 12 steps from basement step to enter with handrail. Handrails are located on Both side. Bathroom Layout: tub/shower unit  Toilet Height: elevated height  Bathroom Equipment: grab bars in shower, tub transfer bench, hand held shower head  Home Equipment: rolling walker, single point cane, manual wheelchair  Transfer Assistance: modified independent with use of grab bars from toilet  Ambulation Assistance:modified independent with use of RW. pt reports he was walking household distances  ADL Assistance: requires assistance with bathing, requires assistance with dressing  IADL Assistance: requires assistance with all homemaking tasks  Active :        []? Yes                 [x]? No  Recent Falls: pt went to [facility] after last admission, pt is currently active with home health care    Examination   Vision:   Vision Gross Assessment: Impaired and Vision Corrective Device: wears glasses for distance  Hearing:   hard of hearing  Observation:   Pt with edema located in B dorsal feet  Posture: Moderate forward head, rounded shoulders, and increased thoracic kyphosis in sitting and standing. Sensation:   reports numbness and tingling in (B) UE, B fingertips  ROM:  (B) LE AROM WFL as observed during functional mobility  Strength:   Not formally assessed, B LE gross strength at least 3/5 observed during functional mobility  Decision Making: low complexity  Clinical Presentation: stable      Subjective  General: Pt supine in bed on arrival, agreeable to participate in PT/OT initial evaluation.    Pain: 0/10, after ambulation pt reported mild pain in R groin  Pain Interventions: not applicable       Functional Mobility  Bed Mobility  Supine to Sit: stand by assistance  Sit to Supine: stand by assistance  Scooting: stand by assistance  Comments: HOB elevated, use of handrail  Transfers  Sit to stand transfer: contact guard assistance  Stand to sit transfer: contact guard assistance  Comments: Use of RW  Ambulation  Surface:level surface  Assistive Device: rolling walker  Assistance: contact guard assistance  Distance: 50' x 1, 40' x 1  Gait Mechanics: Decreased step length, decreased denise, increased thoracic kyphosis throughout gait cycle, steady  Comments:    Stair Mobility  Number of Steps: 5  Hand Rails: (B) handrail  Assistance: contact guard assistance, minimal assistance  Comments: CGA for ascent, min A for descent due to decreased eccentric control  Wheelchair Mobility:  No w/c mobility completed on this date. Comments:  Balance  Static Sitting Balance: fair (+): maintains balance at SBA/supervision without use of UE support  Static Standing Balance: fair (-): maintains balance at CGA with use of UE support  Dynamic Standing Balance: fair (-): maintains balance at CGA with use of UE support  Comments: Pt tolerated sitting EOB for ~ 15 minutes with supervision. Pt then stood and ambulated with CGA and RW. Other Therapeutic Interventions    Functional Outcomes  AM-PAC Inpatient Mobility Raw Score : 17                Cognition  Overall Cognitive Status: Impaired   Increased time for communication, delayed initiation, emotional throughout the initial evaluation  Orientation:    alert and oriented x 4  Command Following:   [facility]  Barriers To Learning: emotional  Patient Education: patient educated on goals, PT role and benefits, plan of care, general safety, discharge recommendations  Learning Assessment:  patient verbalizes understanding, would benefit from continued reinforcement    Assessment  Activity Tolerance: Pt tolerated the initial PT/OT evaluation well however was limited by emotional liability.  Pt did not complain of SOB, lightheadedness, or dizziness throughout. Impairments Requiring Therapeutic Intervention: decreased functional mobility, decreased strength, decreased safety awareness, decreased cognition, decreased endurance, decreased sensation, decreased balance, decreased posture  Prognosis: fair  Clinical Assessment: Pt presents to the hospital with sepsis. In the past few months, the pt has been in and out of the hospital and was recently at [facility] for therapy. The pt is not at his baseline function and is unsafe to return home at this time. Pt would benefit from skilled PT to increase his independence and safety with functional mobility. Safety Interventions: patient left in bed, bed alarm in place, call light within reach, gait belt and nurse notified    Plan  Frequency: 3-5 x/per week  Current Treatment Recommendations: strengthening, balance training, functional mobility training, gait training, stair training, endurance training, patient/caregiver education, home exercise program and safety education    Goals  Patient Goals:  To return home  Short Term Goals:  Time Frame: Upon discharge  Patient will complete bed mobility at [facility] independent   Patient will complete transfers at supervision   Patient will ambulate 100 ft with use of rolling walker at supervision  Patient will ascend/descend 12 stairs with (B) handrail at stand by assistance    Therapy Session Time      Individual Group Co-treatment   Time In     1542   Time Out     1640   Minutes     58     Timed Code Treatment Minutes:  43 Minutes  Total Treatment Minutes: 58 Minutes       Electronically Signed By: MICKEY Rhoades PT, DPT [id number]

## 2022-05-24 NOTE — PROGRESS NOTES
Pt. up to chair from 1135  to 1530. Pt requested back to bed. Pt. To bed with walker X1 person assist. PT/OT here now to work with pt. They will get him back to bed per his request after evaluation. Abx as ordered. Call light in reach and bed alarm intact.

## 2022-05-24 NOTE — PROGRESS NOTES
[facility]  Fax: [phone]        =======================================================================================   =======================================================================================  SUBJECTIVE  Seen resting in bed  Remains on RA w/ COVID-19 (+)  No distress  Past medical, Surgical, Social, Family medical history reviewed by me. MEDICATIONS: reviewed by me. Medications Prior to Admission:  No current facility-administered medications on file prior to encounter. Current Outpatient Medications on File Prior to Encounter   Medication Sig Dispense Refill    oxyCODONE-acetaminophen (PERCOCET)  MG per tablet Take 1 tablet by mouth every 8 hours as needed for Pain.       apixaban (ELIQUIS) 5 MG TABS tablet Take 1 tablet by mouth 2 times daily 60 tablet 1    ferrous sulfate (IRON 325) 325 (65 Fe) MG tablet Take 325 mg by mouth daily (with breakfast)       Cholecalciferol (VITAMIN D3) 50 MCG (2000 UT) CAPS Take by mouth      Cholecalciferol (VITAMIN D3) 125 MCG (5000 UT) TABS Take by mouth      pantoprazole (PROTONIX) 40 MG tablet Take 1 tablet by mouth daily 30 tablet 0    loratadine (CLARITIN) 10 MG tablet Take 10 mg by mouth as needed       ondansetron (ZOFRAN) 4 MG tablet Take 1 tablet by mouth every 4 hours as needed for Nausea or Vomiting 15 tablet 0    atenolol (TENORMIN) 25 MG tablet Take 50 mg by mouth daily       ALPHAGAN P 0.1 % SOLN Place 1 drop into the left eye 3 times daily       dorzolamide (TRUSOPT) 2 % ophthalmic solution Place 1 drop into the right eye 3 times daily       lisinopril (PRINIVIL;ZESTRIL) 5 MG tablet Take 5 mg by mouth daily       simvastatin (ZOCOR) 40 MG tablet Take 40 mg by mouth nightly       zolpidem (AMBIEN) 5 MG tablet 2.5 mg.            Current Facility-Administered Medications:     brimonidine (ALPHAGAN) 0.2 % ophthalmic solution 1 drop, 1 drop, Right Eye, TID, Luis Enrique Coello MD    doxycycline hyclate (VIBRA-TABS) tablet 100 mg, 100 mg, Oral, 2 times per day, Salas Smith MD, 100 mg at 05/24/22 0831    ferrous sulfate (IRON 325) tablet 325 mg, 325 mg, Oral, Daily with breakfast, Salas Smith MD, 325 mg at 05/24/22 0816    cetirizine (ZYRTEC) tablet 5 mg, 5 mg, Oral, Daily, Salas Smith MD, 5 mg at 05/24/22 0816    oxyCODONE-acetaminophen (PERCOCET)  MG per tablet 1 tablet, 1 tablet, Oral, Q8H PRN, Salas Smith MD    dorzolamide (TRUSOPT) 2 % ophthalmic solution 1 drop, 1 drop, Right Eye, TID, Luann Moreira MD    cefTRIAXone (ROCEPHIN) 1000 mg in sterile water 10 mL IV syringe, 1,000 mg, IntraVENous, Q24H, [address], PA-C, 1,000 mg at 05/23/22 1646    sodium chloride flush 0.9 % injection 10 mL, 10 mL, IntraVENous, 2 times per day, Niall Watters MD, 10 mL at 05/24/22 0817    sodium chloride flush 0.9 % injection 10 mL, 10 mL, IntraVENous, PRN, Niall Watters MD    0.9 % sodium chloride infusion, , IntraVENous, PRN, Niall Watters MD    potassium chloride (KLOR-CON M) extended release tablet 40 mEq, 40 mEq, Oral, PRN **OR** potassium bicarb-citric acid (EFFER-K) effervescent tablet 40 mEq, 40 mEq, Oral, PRN **OR** potassium chloride 10 mEq/100 mL IVPB (Peripheral Line), 10 mEq, IntraVENous, PRN, Niall Watters MD    potassium chloride 10 mEq/100 mL IVPB (Peripheral Line), 10 mEq, IntraVENous, PRN, Niall Watters MD    magnesium sulfate 2000 mg in 50 mL IVPB premix, 2,000 mg, IntraVENous, PRN, Niall Watters MD    promethazine (PHENERGAN) tablet 12.5 mg, 12.5 mg, Oral, Q6H PRN **OR** [DISCONTINUED] ondansetron (ZOFRAN) injection 4 mg, 4 mg, IntraVENous, Q6H PRN, Niall Watters MD    magnesium hydroxide (MILK OF MAGNESIA) 400 MG/5ML suspension 30 mL, 30 mL, Oral, Daily PRN, Niall Watters MD    acetaminophen (TYLENOL) tablet 650 mg, 650 mg, Oral, Q6H PRN **OR** acetaminophen (TYLENOL) suppository 650 mg, 650 mg, Rectal, Q6H PRN, Niall Watters MD    0.9 % sodium chloride infusion, , IntraVENous, Continuous, Leona Opitz, MD    apixaban (ELIQUIS) tablet 5 mg, 5 mg, Oral, BID, Brooks Chen MD, 5 mg at 05/24/22 0816    atenolol (TENORMIN) tablet 50 mg, 50 mg, Oral, Daily, Brooks Chen MD, 50 mg at 05/24/22 0816    lisinopril (PRINIVIL;ZESTRIL) tablet 5 mg, 5 mg, Oral, Daily, Brooks Chen MD, 5 mg at 05/24/22 0816    pantoprazole (PROTONIX) tablet 40 mg, 40 mg, Oral, QAM AC, Lazaro Murray MD, 40 mg at 05/24/22 7099    atorvastatin (LIPITOR) tablet 20 mg, 20 mg, Oral, Nightly, Lazaro Murray MD, 20 mg at 05/23/22 2109    zolpidem (AMBIEN) tablet 2.5 mg, 2.5 mg, Oral, Nightly PRN, Brooks Chen MD, 2.5 mg at 05/23/22 2159       =======================================================================================     PHYSICAL EXAM:  Recent vital signs and recent I/Os reviewed by me.      Wt Readings from Last 3 Encounters:   05/24/22 134 lb 4.2 oz (60.9 kg)   04/15/22 124 lb 11.2 oz (56.6 kg)   02/15/22 121 lb 4.1 oz (55 kg)     BP Readings from Last 3 Encounters:   05/24/22 115/79   04/22/22 121/77   03/02/22 (!) 144/71     Patient Vitals for the past 24 hrs:   BP Temp Temp src Pulse Resp SpO2 Height Weight   05/24/22 0816 115/79 -- -- -- -- -- -- --   05/24/22 0800 115/79 97.3 °F (36.3 °C) Temporal 80 16 99 % -- --   05/24/22 0637 -- -- -- -- -- -- -- 134 lb 4.2 oz (60.9 kg)   05/24/22 0400 (!) 95/48 97 °F (36.1 °C) Temporal 78 16 97 % -- --   05/24/22 0008 (!) 98/58 97.1 °F (36.2 °C) Temporal 77 18 98 % -- --   05/23/22 2000 96/82 97.4 °F (36.3 °C) Temporal 82 18 98 % -- --   05/23/22 1800 118/72 -- -- 82 18 -- -- --   05/23/22 1600 105/66 97.4 °F (36.3 °C) Tympanic 89 16 98 % -- 138 lb 3.7 oz (62.7 kg)   05/23/22 1430 -- -- -- 83 -- -- -- --   05/23/22 1200 111/78 97.1 °F (36.2 °C) Tympanic 81 -- -- -- --   05/23/22 1147 -- -- -- -- -- -- 5' 10\" (1.778 m) --   05/23/22 1100 -- -- -- 78 -- -- -- --       Intake/Output Summary (Last 24 hours) at 5/24/2022 1003  Last data filed at 5/24/2022 0436  Gross per 24 hour   Intake 10 ml   Output 1000 ml   Net -990 ml         Physical Exam  Vitals reviewed. Constitutional:       General: He is not in acute distress. Appearance: Normal appearance. He is ill-appearing. HENT:      Head: Normocephalic and atraumatic. Right Ear: External ear normal.      Left Ear: External ear normal.      Nose: Nose normal.      Mouth/Throat:      Mouth: Mucous membranes are moist. Mucous membranes are not dry. Eyes:      General: No scleral icterus. Conjunctiva/sclera: Conjunctivae normal.   Neck:      Vascular: No JVD. Cardiovascular:      Rate and Rhythm: Normal rate and regular rhythm. Heart sounds: S1 normal and S2 normal.   Pulmonary:      Effort: Pulmonary effort is normal. No respiratory distress. Breath sounds: Rhonchi present. Abdominal:      General: Bowel sounds are normal. There is no distension. Musculoskeletal:         General: No swelling or deformity. Cervical back: Normal range of motion and neck supple. Skin:     General: Skin is dry. Coloration: Skin is not jaundiced. Neurological:      Mental Status: He is alert and oriented to person, place, and time. Mental status is at baseline. Psychiatric:         Mood and Affect: Mood normal.         Behavior: Behavior normal.          =======================================================================================     DATA:  Diagnostic tests reviewed by me for today's visit:   (AS NEEDED FOR MY EVALUATION AND MANAGEMENT).        Recent Labs     05/22/22  1335 05/23/22 0442   WBC 9.4 8.9   HCT 37.6* 36.9*    125*     Iron Saturation:  No components found for: PERCENTFE  FERRITIN:    Lab Results   Component Value Date    FERRITIN 29.2 02/01/2022     IRON:    Lab Results   Component Value Date    IRON 27 02/01/2022     TIBC:    Lab Results   Component Value Date    TIBC 267 02/01/2022       Recent Labs     05/22/22  1335 05/23/22  0442 05/24/22  0510 * 134* 136   K 3.8 3.9 3.4*   CL 99 104 104   CO2 15* 19* 21   BUN 22* 23* 21*   CREATININE 1.0 0.8 0.9     Recent Labs     05/22/22  1335 05/23/22  0442 05/24/22  0510   CALCIUM 8.5 8.6 8.3   MG  --   --  1.80   PHOS  --  3.0 2.8     No results for input(s): PH, PCO2, PO2 in the last 72 hours.     Invalid input(s): Wes Schaefer    ABG:  No results found for: PH, PCO2, PO2, HCO3, BE, THGB, TCO2, O2SAT  VBG:  No results found for: PHVEN, XGH1ZSF, BEVEN, K0YGGVOD    LDH:    Lab Results   Component Value Date     02/02/2022     Uric Acid:    Lab Results   Component Value Date    LABURIC 3.6 05/22/2022       PT/INR:    Lab Results   Component Value Date    PROTIME 22.6 05/22/2022    INR 1.95 05/22/2022     Warfarin PT/INR:  No components found for: Beverlie Essex  PTT:    Lab Results   Component Value Date    APTT 38.4 05/22/2022   [APTT}  Last 3 Troponin:    Lab Results   Component Value Date    TROPONINI 0.02 05/22/2022    TROPONINI 0.03 02/01/2022    TROPONINI <0.01 04/28/2021       U/A:    Lab Results   Component Value Date    COLORU Yellow 04/12/2022    PROTEINU TRACE 04/12/2022    PHUR 5.0 04/12/2022    WBCUA 1 04/12/2022    RBCUA 4 04/12/2022    BACTERIA 3+ 01/15/2018    CLARITYU Clear 04/12/2022    SPECGRAV >=1.030 04/12/2022    LEUKOCYTESUR Negative 04/12/2022    UROBILINOGEN 1.0 04/12/2022    BILIRUBINUR SMALL 04/12/2022    BLOODU Negative 04/12/2022    GLUCOSEU Negative 04/12/2022     Microalbumen/Creatinine ratio:  No components found for: RUCREAT  24 Hour Urine for Protein:  No components found for: RAWUPRO, UHRS3, JWRZ03OS, UTV3  24 Hour Urine for Creatinine Clearance:  No components found for: CREAT4, UHRS10, UTV10  Urine Toxicology:  No components found for: IAMMENTA, IBARBIT, IBENZO, ICOCAINE, IMARTHC, IOPIATES, IPHENCYC    HgBA1c:  No results found for: LABA1C  RPR:  No results found for: RPR  HIV:  No results found for: HIV  PORTER:  No results found for: ANATITER, PORTER  RF: No results found for: RF  DSDNA:  No components found for: DNA  AMYLASE:    Lab Results   Component Value Date    AMYLASE 168 01/17/2018     LIPASE:    Lab Results   Component Value Date    LIPASE 24.0 05/22/2022     Fibrinogen Level:  No components found for: FIB       BELOW MENTIONED RADIOLOGY STUDY RESULTS BY ME (AS NEEDED FOR MY EVALUATION AND MANAGEMENT). XR CHEST PORTABLE    Result Date: 5/22/2022  EXAMINATION: ONE XRAY VIEW OF THE CHEST 5/22/2022 1:16 pm COMPARISON: February 1, 2022 HISTORY: ORDERING SYSTEM PROVIDED HISTORY: SOB TECHNOLOGIST PROVIDED HISTORY: Reason for exam:->SOB Reason for Exam: Fatigue ([facility] from home d/t weakness and frequent falls x 2 days. per EMS, also states some confusion. ) FINDINGS: The cardiomediastinal silhouette is mildly enlarged, stable. There is a right-sided port with distal tip at the cavoatrial junction. Patchy airspace disease involving the parahilar region of the right upper lobe is concerning for pneumonia. Left lung is clear. No pleural effusion or pneumothorax. 1. Suspected right upper lobe pneumonia. Radiographic follow-up recommended to ensure resolution. 2. Mild cardiomegaly, stable. New right-sided port with distal tip at the cavoatrial junction. CT CHEST ABDOMEN PELVIS W CONTRAST    Result Date: 5/19/2022  EXAM: CT CHEST ABDOMEN AND PELVIS INDICATION: Restaging. 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 COMPARISON: CT of the chest, abdomen, and pelvis 2/1/2022 and MRI of the right hip 3/31/2022. TECHNIQUE: Axial CT imaging obtained through the chest, abdomen and pelvis. Axial images and multiplanar reformatted images were reviewed. Up-to-date CT equipment and radiation dose reduction techniques were employed. IV Contrast: 80 mL Isovue-370. Oral Contrast: Yes.  CT CHEST: Lungs and Pleura: Left upper lung pulmonary nodule axial image 18 has decreased in size compared to prior exam. Right upper lobe nodule axial image 26 has markedly decreased in size. Bilobed nodule in the anterior left upper lobe axial image 28 appears unchanged. Dominant nodule in the right lower lobe superior segment has nearly completely resolved. The central nodule along the posterior aspect of the inferior left hilum has significantly decreased in size. 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 Representative nodule which is new and image 58 of series 601 measures 10 mm. Multiple nodules in the left lung base have markedly decreased in size. Representative nodule in the left lower lobe image 87 measures 13 x 15 mm, previously measuring 16 x 15 mm. No pleural effusion. Mediastinum: Markedly improved nodular thickening along the esophagus. A subcarinal lymph node and gastroesophageal lymph nodes are markedly decreased in size. Lymph node on image 58 in the inferior mediastinum measures 16 x 11 mm, previously this measured 33 x 19 mm. Left paratracheal and right paratracheal lymph nodes decreased in size compared to prior exam. Lower Neck and Chest Wall: No axillary lymphadenopathy. No supraclavicular lymphadenopathy. Thyroid gland is unremarkable. Bones: No suspicious osseous lesion identified. CT ABDOMEN AND PELVIS: Upper Abdomen: Numerous hepatic metastasis which appear less distinct on the current exam. Lesion in the right lobe of liver image 95 measures 23 x 20 mm, present measuring 30 x 22 mm. Lesion in the left lobe of liver image 98 measures 26 x 19 mm, previously measuring 29 x 19 mm. Lesion in the inferior right lobe liver image 103 measures 24 x 21 mm, previously measuring 19 x 17 mm. No definite new liver lesion clearly identified. Spleen is unchanged. Pancreas is unremarkable. Mixed response of upper abdominal lymphadenopathy.  There is a retrocaval lymph node axial image 117 which has increased in size showing 31 x 15 mm, previously measuring 30 x 9 mm. Aortocaval lymph node axial image 117 measures 20 x 14 mm, previously measuring 23 x 16 mm. Previous portal caval lymph node is no longer clearly measurable. A left paraceliac lymph node has decreased in size image 112 measuring 10 x 12 mm, previously measuring 20 x 28 mm. Additional retroperitoneal lymph nodes appear similar in size or slightly decreased. No pancreatic abnormality. Retroperitoneum: No hydronephrosis. Stable adrenal glands. Bowel and Peritoneum: Nonobstructive bowel gas pattern. No free air. No significant free fluid. Extensive sigmoid diverticulosis. Extensive vascular calcifications of the aorta. Pelvis: No pelvic lymphadenopathy identified. Bladder is unremarkable. Prostate is mildly enlarged with calcifications. Bones: The lytic lesion in the right acetabulum is difficult to visualize. There is a small incomplete fracture along the roof of the acetabulum suggesting a pathologic fracture. Clinical correlation recommended. No new osseous lesion clearly identified. CHEST: 1. 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 2. Numerous previously identified nodules in the lungs bilaterally have decreased in size in the interval. Some of the nodules are no longer clearly visualized. The mediastinal lymphadenopathy has markedly decreased in the interval compatible with response to therapy. No progressive lymphadenopathy identified. Overall mixed response with new nodularity in the superior segment left lower lobe. 3. Marked improvement in nodular thickening of the mid and lower esophagus compared to previous exam. ABDOMEN/PELVIS: 1. New incomplete pathologic fracture along the roof of the right acetabulum. Correlate clinically. 2. Extensive hepatic metastatic disease with many of the nodules measuring stable or slightly decreased in size. A few nodules measure slightly larger.  3. Next response of upper abdominal lymphadenopathy with a few lymph nodes measuring slightly larger and others measuring decreased in size in comparison to prior exam. 4. Extensive diverticulosis without diverticulitis. 5. No new osseous lesion identified.

## 2022-05-24 NOTE — PROGRESS NOTES
Hospitalist Progress Note      PCP: Daryl Leon MD    Date of Admission: 5/22/2022    Hospital Course: This 53-year-old male with PMHx of stage IV metastatic distal esophageal carcinoma, DVT on Eliquis presented with generalized weakness. According to the patient he has been feeling weak for 3 to 4 days, he also has generalized weakness and has been having difficulty ambulation. Patient was also found positive for COVID-19. \"     Interval history  Pt seen and examined today. Overnight events noted, interval ancillary notes and labs reviewed. Remains on room air satting well. Afebrile overnight, WBCs within normal limits  Low potassium this morning: Replacement ordered  denied cough, SOB, chest pain, nausea, vomiting or abdominal pain      Assessment/Plan:    Active Hospital Problems    Diagnosis     Sepsis ([facility] [address] [A41.9]      Priority: Medium     Assessment    Right upper lobe pneumonia: Likely secondary to COVID-19/superimposed bacterial pneumonia? Febrile with elevated procalcitonin on admission. 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 Last treatment of trastuzumab and pembrolizumab only was given on 3/29/2022. 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 New incomplete pathological fracture along the roof of right acetabulum  Oncology consulted:  Follow-up with Dr. Nupur Mason as outpatient to discuss further treatment plan    Hx of DVT: On Eliquis    Hyponatremia: Likely secondary from hypovolemia  Nephrology on board: Appreciate their recs  Monitor sodium levels closely    Lactic acidosis likely secondary to dehydration: Resolved    Atrial fibrillation: Continue Eliquis and beta-blocker. Monitor telemetry    Elevated troponin likely demand ischemia: Remained flat    QTC prolongation: Avoid QTC prolonging drugs    Generalized weakness: PT OT consulted       DVT Prophylaxis: Eliquis  Diet: ADULT DIET; Regular  ADULT ORAL NUTRITION SUPPLEMENT; Dinner, Breakfast; Standard High Calorie/High Protein Oral Supplement  Code Status: Full Code    PT/OT Eval Status: As needed    Dispo -Home in 1 to 2 days            Medications:  Reviewed    Infusion Medications    sodium chloride      sodium chloride       Scheduled Medications    brimonidine  1 drop Right Eye TID    doxycycline hyclate  100 mg Oral 2 times per day    ferrous sulfate  325 mg Oral Daily with breakfast    cetirizine  5 mg Oral Daily    dorzolamide  1 drop Right Eye TID    cefTRIAXone (ROCEPHIN) IV  1,000 mg IntraVENous Q24H    sodium chloride flush  10 mL IntraVENous 2 times per day    apixaban  5 mg Oral BID    atenolol  50 mg Oral Daily    lisinopril  5 mg Oral Daily    pantoprazole  40 mg Oral QAM AC    atorvastatin  20 mg Oral Nightly     PRN Meds: oxyCODONE-acetaminophen, sodium chloride flush, sodium chloride, potassium chloride **OR** potassium alternative oral replacement **OR** potassium chloride, potassium chloride, magnesium sulfate, promethazine **OR** [DISCONTINUED] ondansetron, magnesium hydroxide, acetaminophen **OR** acetaminophen, zolpidem      Intake/Output Summary (Last 24 hours) at 5/24/2022 0935  Last data filed at 5/24/2022 0436  Gross per 24 hour   Intake 10 ml   Output 1000 ml   Net -990 ml       Physical Exam Performed:    /79   Pulse 80   Temp 97.3 °F (36.3 °C) (Temporal)   Resp 16   Ht 5' 10\" (1.778 m)   Wt 134 lb 4.2 oz (60.9 kg)   SpO2 99%   BMI 19.26 kg/m²     General appearance: No apparent distress, appears stated age and cooperative. HEENT:MMM  Neck: Supple, with full range of motion.   Trachea midline. Respiratory:  Normal respiratory effort. Diminished at bases  Cardiovascular: Regular rate and rhythm with normal S1/S2 without murmurs,+Port-a-cath. Abdomen: Soft, non-tender, non-distended with normal bowel sounds. Musculoskeletal: No clubbing, cyanosis or edema bilaterally. Skin: Skin color, texture, turgor normal.  No rashes or lesions. Neurologic:  Neurovascularly intact without any focal sensory/motor deficits. grossly non-focal.  Psychiatric: Alert and oriented, thought content appropriate, normal insight      Labs:   Recent Labs     05/22/22  1335 05/23/22  0442   WBC 9.4 8.9   HGB 12.1* 12.0*   HCT 37.6* 36.9*    125*     Recent Labs     05/22/22  1335 05/23/22  0442 05/24/22  0510   * 134* 136   K 3.8 3.9 3.4*   CL 99 104 104   CO2 15* 19* 21   BUN 22* 23* 21*   CREATININE 1.0 0.8 0.9   CALCIUM 8.5 8.6 8.3   PHOS  --  3.0 2.8     Recent Labs     05/22/22  1335   AST 49*   ALT 27   BILITOT 0.8   ALKPHOS 140*     Recent Labs     05/22/22  1335   INR 1.95*     Recent Labs     05/22/22  1335   TROPONINI 0.02*       Urinalysis:      Lab Results   Component Value Date    NITRU Negative 04/12/2022    WBCUA 1 04/12/2022    BACTERIA 3+ 01/15/2018    RBCUA 4 04/12/2022    BLOODU Negative 04/12/2022    SPECGRAV >=1.030 04/12/2022    GLUCOSEU Negative 04/12/2022       Radiology:  XR CHEST PORTABLE   Final Result   1. Suspected right upper lobe pneumonia. Radiographic follow-up recommended   to ensure resolution. 2. Mild cardiomegaly, stable. New right-sided port with distal tip at the   cavoatrial junction.                  Helen Park MD

## 2022-05-24 NOTE — PROGRESS NOTES
[facility]   Phone: [phone]    Occupational Therapy    [x] Initial Evaluation            [] Daily Treatment Note         [] Discharge Summary      Patient: Nazia Mitchell   : 1940   MRN: [id number]   Date of Service:  2022    Admitting Diagnosis:  Sepsis [facility])  Current Admission Summary: pt admitted for sepsis. Currently being treated for PNA, COVID+  Pt with a history of esophogeal cancer  Past Medical History:  has a past medical history of Allergic rhinitis, Anxiety state, Atrial fibrillation ([facility] [address] CAD (coronary artery disease), Cancer ([facility] [address] Cataract, Chronic ischemic heart disease, Esophageal reflux, Essential hypertension, Glaucoma, Influenza, Insomnia, and Other and unspecified hyperlipidemia. Past Surgical History:  has a past surgical history that includes Coronary angioplasty with stent; eye surgery; hernia repair; Colonoscopy; ERCP (2018); Cholecystectomy, laparoscopic (2018); Upper gastrointestinal endoscopy (N/A, 2022); Colonoscopy (N/A, 2022); and IR PORT PLACEMENT > 5 YEARS (2/15/2022). Discharge Recommendations: Nazia Mitchell scored a 16/24 on the AM-PAC ADL Inpatient form. Current research shows that an AM-PAC score of 17 or less is typically not associated with a discharge to the patient's home setting. Based on the patient's AM-PAC score and their current ADL deficits, it is recommended that the patient have 3-5 sessions per week of Occupational Therapy at d/c to increase the patient's independence. Please see assessment section for further patient specific details.     If pt and wife refuse recommendation: HOME HEALTH CARE: LEVEL 3 SAFETY     - Initial home health evaluation to occur within 24-48 hours, in patient home   - Therapy evaluations in home within 24-48 hours of discharge; including DME and home safety   - Frontload therapy 5 days, then 3x a week   - Therapy to evaluate if patient has Home Health Aide needs for personal care   -  evaluation within 24-48 hours, includes evaluation of resources and insurance to determine [place], [place], LTC, and Medicaid options       If patient discharges prior to next session this note will serve as a discharge summary. Please see below for the latest assessment towards goals. DME Required For Discharge: no DME required at discharge , pt has needed DME    Precautions/Restrictions: high fall risk    Pre-Admission Information   Lives With: spouse    Type of Home: house  Home Layout: two level  Enter thru the basement  Home Access: 12 steps from basement step to enter with handrail. Handrails are located on Both side.   Bathroom Layout: tub/shower unit  Toilet Height: elevated height  Bathroom Equipment: grab bars in shower, tub transfer bench, hand held shower head  Home Equipment: rolling walker, single point cane, manual wheelchair  Transfer Assistance: modified independent with use of grab bars from toilet  Ambulation Assistance:modified independent with use of RW. pt reports he was walking household distances  ADL Assistance: requires assistance with bathing, requires assistance with dressing  IADL Assistance: requires assistance with all homemaking tasks  Active :        [] Yes  [x] No  Recent Falls: pt went to [facility] after last admission, pt is currently active with home health care    Examination   Vision:   Vision Corrective Device: wears glasses for distance  Hearing:   hard of hearing  Perception:   WFL  Observation:   General Observation:  small frame, forward flexed, rounded shoulders  Posture:   Rounded shoulders  Sensation:   reports numbness and tingling in (B) UE fingertips  Proprioception:    WFL  Tone:   Normotonic  Coordination Testing:   WFL    ROM:   (B) UE AROM WFL  Strength:   (B) UE strength grossly 4    Decision Making: medium complexity  Clinical Presentation: stable      Subjective  General: pt supine upon arrival, RN approval to see pt. Pt denies pain  Pain: 0/10  Pain Interventions: not applicable        Activities of Daily Living  Basic Activities of Daily Living  General Comments: no ADLs completed this date  Instrumental Activities of Daily Living  No IADL completed on this date. Functional Mobility  Bed Mobility  Supine to Sit: stand by assistance  Sit to Supine: stand by assistance  Scooting: stand by assistance  Comments:  Transfers  Sit to stand transfer:stand by assistance  Stand to sit transfer: stand by assistance  Comments: VCs and HOB elevated  Functional Mobility:  Sitting balance: CGA to SBA. Posterior lean   Functional mobility: ~50 ft in room with CGA and use of RW. Approval from charge RNSalazar to mask pt and do steps. 5 Steps with B HR used. CGA up steps, Eli down steps. Increased time. Hands washed prior to leaving room    Other Therapeutic Interventions    Functional Outcomes  AM-PAC Inpatient Daily Activity Raw Score: 16    Cognition  Overall Cognitive Status: Impaired   Increased time for communication, delayed initiation  Orientation:    alert and oriented x 4  Command Following:   [facility]     Education  Barriers To Learning: hearing  Patient Education: patient educated on plan of care, discharge recommendations, OOB ax  Learning Assessment:  patient verbalizes understanding, would benefit from continued reinforcement    Assessment  Activity Tolerance: pt tolerated tx well. Unable to get SOB reading, mildly SOB   Impairments Requiring Therapeutic Intervention: decreased functional mobility, decreased ADL status, decreased strength  Prognosis: good  Clinical Assessment: pt with stage 4 esophageal cancer, reports he is currently going through treatment. Recently at [facility] for therapy.  Pt not at baseline and would benefit from ongoing skilled OT services in order to return to [facility]  Safety Interventions: patient left in bed, bed alarm in place, call light within reach, gait belt, patient at risk for falls and nurse notified    Plan  Frequency: 3-5 x/per week  Current Treatment Recommendations: strengthening, balance training, functional mobility training, endurance training, patient/caregiver education and ADL/self-care training    Goals  Patient Goals: go home with spouse and HHOT/PT   Short Term Goals:  Time Frame: discharge  Bed mobility mod I  Functional ADL transfer supervision  Functional mobility with RW and supervision  UB ADLs set up  LB ADLs set up    Therapy Session Time     Individual Group Co-treatment   Time In    1542   Time Out    1636   Minutes    54        Timed Code Treatment Minutes:   39 minutes  Total Treatment Minutes:  54 minutes       Electronically Signed By: Ajit Cordova [phone] [id number] [facility], OTR/L [id number]\

## 2022-05-24 NOTE — PROGRESS NOTES
Pt. Awake, alert, and oriented. Placed hearing aides in, as pt [facility]. Breakfast to bedside. Meds as ordered. Pt. Denies further needs. Bed alarm intact and call light in reach.

## 2022-05-24 NOTE — PROGRESS NOTES
Wife in for a visit today she sat outside the room and talked to patient on the phone. Oncologist in as well and stated he will call wife and update her today. Plan for home with home care when discharged.

## 2022-05-25 LAB
ALBUMIN SERPL-MCNC: 2.6 G/DL (ref 3.4–5)
ANION GAP SERPL CALCULATED.3IONS-SCNC: 9 MMOL/L (ref 3–16)
BUN BLDV-MCNC: 24 MG/DL (ref 7–20)
CALCIUM SERPL-MCNC: 8.6 MG/DL (ref 8.3–10.6)
CHLORIDE BLD-SCNC: 104 MMOL/L (ref 99–110)
CO2: 21 MMOL/L (ref 21–32)
CREAT SERPL-MCNC: 0.8 MG/DL (ref 0.8–1.3)
GFR AFRICAN AMERICAN: >60
GFR NON-AFRICAN AMERICAN: >60
GLUCOSE BLD-MCNC: 97 MG/DL (ref 70–99)
PHOSPHORUS: 2.3 MG/DL (ref 2.5–4.9)
POTASSIUM SERPL-SCNC: 4.2 MMOL/L (ref 3.5–5.1)
SODIUM BLD-SCNC: 134 MMOL/L (ref 136–145)

## 2022-05-25 PROCEDURE — 2500000003 HC RX 250 WO HCPCS: Performed by: INTERNAL MEDICINE

## 2022-05-25 PROCEDURE — 36415 COLL VENOUS BLD VENIPUNCTURE: CPT

## 2022-05-25 PROCEDURE — 6370000000 HC RX 637 (ALT 250 FOR IP): Performed by: INTERNAL MEDICINE

## 2022-05-25 PROCEDURE — 2580000003 HC RX 258: Performed by: INTERNAL MEDICINE

## 2022-05-25 PROCEDURE — 6360000002 HC RX W HCPCS: Performed by: PHYSICIAN ASSISTANT

## 2022-05-25 PROCEDURE — 80069 RENAL FUNCTION PANEL: CPT

## 2022-05-25 PROCEDURE — 97116 GAIT TRAINING THERAPY: CPT

## 2022-05-25 PROCEDURE — 2060000000 HC ICU INTERMEDIATE R&B

## 2022-05-25 PROCEDURE — 97110 THERAPEUTIC EXERCISES: CPT

## 2022-05-25 PROCEDURE — 97530 THERAPEUTIC ACTIVITIES: CPT

## 2022-05-25 RX ADMIN — BRIMONIDINE TARTRATE 1 DROP: 2 SOLUTION OPHTHALMIC at 15:03

## 2022-05-25 RX ADMIN — ATORVASTATIN CALCIUM 20 MG: 20 TABLET, FILM COATED ORAL at 21:12

## 2022-05-25 RX ADMIN — Medication 10 ML: at 21:12

## 2022-05-25 RX ADMIN — ATENOLOL 50 MG: 50 TABLET ORAL at 09:42

## 2022-05-25 RX ADMIN — Medication 1000 MG: at 14:55

## 2022-05-25 RX ADMIN — LISINOPRIL 5 MG: 5 TABLET ORAL at 09:43

## 2022-05-25 RX ADMIN — DORZOLAMIDE HYDROCHLORIDE 1 DROP: 20 SOLUTION/ DROPS OPHTHALMIC at 09:49

## 2022-05-25 RX ADMIN — SODIUM PHOSPHATE, MONOBASIC, MONOHYDRATE 15 MMOL: 276; 142 INJECTION, SOLUTION INTRAVENOUS at 15:03

## 2022-05-25 RX ADMIN — BRIMONIDINE TARTRATE 1 DROP: 2 SOLUTION OPHTHALMIC at 21:13

## 2022-05-25 RX ADMIN — DOXYCYCLINE HYCLATE 100 MG: 100 TABLET, COATED ORAL at 21:12

## 2022-05-25 RX ADMIN — APIXABAN 5 MG: 5 TABLET, FILM COATED ORAL at 21:12

## 2022-05-25 RX ADMIN — BRIMONIDINE TARTRATE 1 DROP: 2 SOLUTION OPHTHALMIC at 09:52

## 2022-05-25 RX ADMIN — PANTOPRAZOLE SODIUM 40 MG: 40 TABLET, DELAYED RELEASE ORAL at 05:48

## 2022-05-25 RX ADMIN — Medication 10 ML: at 09:49

## 2022-05-25 RX ADMIN — DOXYCYCLINE HYCLATE 100 MG: 100 TABLET, COATED ORAL at 09:42

## 2022-05-25 RX ADMIN — CETIRIZINE HYDROCHLORIDE 5 MG: 10 TABLET, FILM COATED ORAL at 09:44

## 2022-05-25 RX ADMIN — APIXABAN 5 MG: 5 TABLET, FILM COATED ORAL at 09:43

## 2022-05-25 RX ADMIN — DORZOLAMIDE HYDROCHLORIDE 1 DROP: 20 SOLUTION/ DROPS OPHTHALMIC at 14:52

## 2022-05-25 RX ADMIN — DORZOLAMIDE HYDROCHLORIDE 1 DROP: 20 SOLUTION/ DROPS OPHTHALMIC at 21:13

## 2022-05-25 ASSESSMENT — PAIN SCALES - GENERAL: PAINLEVEL_OUTOF10: 0

## 2022-05-25 NOTE — PROGRESS NOTES
Hospitalist Progress Note      PCP: Christoph Woo MD    Date of Admission: 5/22/2022    Hospital Course: This 17-year-old male with PMHx of stage IV metastatic distal esophageal carcinoma, DVT on Eliquis presented with generalized weakness. According to the patient he has been feeling weak for 3 to 4 days, he also has generalized weakness and has been having difficulty ambulation. Patient was also found positive for COVID-19. \"     Interval history  Pt seen and examined today. Overnight events noted, interval ancillary notes and labs reviewed. Hemodynamically stable; remains on room air  Up in chair; denied any fever, chills, cough, SOB, chest pain, nausea, vomiting or abdominal pain. Patient and wife refusing discharge home today stating that they do not have arrangement at home to take care of patient as he he has had intermittent incontinence of stool. Assessment/Plan:    Active Hospital Problems    Diagnosis     Sepsis ([facility] [address] [A41.9]      Priority: Medium     Assessment    Right upper lobe pneumonia: Likely secondary to COVID-19/superimposed bacterial pneumonia? Febrile with elevated procalcitonin on admission. 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 Last treatment of trastuzumab and pembrolizumab only was given on 3/29/2022. 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 New incomplete pathological fracture along the roof of right acetabulum  Oncology consulted:  Follow-up with Dr. Kasie Alonzo as outpatient to discuss further treatment plan    Hx of DVT: On Eliquis    Hyponatremia: Likely secondary from hypovolemia  Nephrology on board: Appreciate their recs  Monitor sodium levels closely    Lactic acidosis likely secondary to dehydration: Resolved    Hx of atrial fibrillation: Rate controlled with beta-blocker. Continue Eliquis and monitor telemetry    Elevated troponin likely demand ischemia: Remained flat    QTC prolongation: Avoid QTC prolonging drugs    Generalized weakness: PT OT consulted       DVT Prophylaxis: Eliquis  Diet: ADULT DIET;  Regular  ADULT ORAL NUTRITION SUPPLEMENT; Dinner, Breakfast; Standard High Calorie/High Protein Oral Supplement  Code Status: Full Code    PT/OT Eval Status: As needed    Dispo -Home in 1 to 2 days            Medications:  Reviewed    Infusion Medications    sodium chloride       Scheduled Medications    brimonidine  1 drop Right Eye TID    doxycycline hyclate  100 mg Oral 2 times per day    ferrous sulfate  325 mg Oral Daily with breakfast    cetirizine  5 mg Oral Daily    dorzolamide  1 drop Right Eye TID    cefTRIAXone (ROCEPHIN) IV  1,000 mg IntraVENous Q24H    sodium chloride flush  10 mL IntraVENous 2 times per day    apixaban  5 mg Oral BID    atenolol  50 mg Oral Daily    lisinopril  5 mg Oral Daily    pantoprazole  40 mg Oral QAM AC    atorvastatin  20 mg Oral Nightly     PRN Meds: oxyCODONE-acetaminophen, sodium chloride flush, sodium chloride, potassium chloride **OR** potassium alternative oral replacement **OR** potassium chloride, potassium chloride, magnesium sulfate, promethazine **OR** [DISCONTINUED] ondansetron, magnesium hydroxide, acetaminophen **OR** acetaminophen, zolpidem      Intake/Output Summary (Last 24 hours) at 5/25/2022 0921  Last data filed at 5/25/2022 0549  Gross per 24 hour   Intake 240 ml   Output 700 ml   Net -460 ml       Physical Exam Performed:    BP (!) 131/96   Pulse 83   Temp 98.6 °F (37 °C) (Temporal)   Resp 16   Ht 5' 10\" (1.778 m)   Wt 132 lb 15 oz (60.3 kg)   SpO2 100%   BMI 19.07 kg/m²     General appearance: No apparent distress, appears stated age and cooperative. HEENT:MMM  Neck: Supple, with full range of motion. Trachea midline. Respiratory:  Normal respiratory effort. Diminished at bases  Cardiovascular: Regular rate and rhythm with normal S1/S2 without murmurs,+Port-a-cath. Abdomen: Soft, non-tender, non-distended with normal bowel sounds. Musculoskeletal: No clubbing, cyanosis or edema bilaterally. Skin: Skin color, texture, turgor normal.  No rashes or lesions. Neurologic:  Neurovascularly intact without any focal sensory/motor deficits. grossly non-focal.  Psychiatric: Alert and oriented, thought content appropriate, normal insight      Labs:   Recent Labs     05/22/22  1335 05/23/22  0442   WBC 9.4 8.9   HGB 12.1* 12.0*   HCT 37.6* 36.9*    125*     Recent Labs     05/23/22  0442 05/24/22  0510 05/25/22  0542   * 136 134*   K 3.9 3.4* 4.2    104 104   CO2 19* 21 21   BUN 23* 21* 24*   CREATININE 0.8 0.9 0.8   CALCIUM 8.6 8.3 8.6   PHOS 3.0 2.8 2.3*     Recent Labs     05/22/22  1335   AST 49*   ALT 27   BILITOT 0.8   ALKPHOS 140*     Recent Labs     05/22/22  1335   INR 1.95*     Recent Labs     05/22/22  1335   TROPONINI 0.02*       Urinalysis:      Lab Results   Component Value Date    NITRU Negative 05/24/2022    WBCUA 1 05/24/2022    BACTERIA None Seen 05/24/2022    RBCUA 1 05/24/2022    BLOODU Negative 05/24/2022    SPECGRAV 1.015 05/24/2022    GLUCOSEU Negative 05/24/2022       Radiology:  XR CHEST PORTABLE   Final Result   1. Suspected right upper lobe pneumonia. Radiographic follow-up recommended   to ensure resolution. 2. Mild cardiomegaly, stable. New right-sided port with distal tip at the   cavoatrial junction.                  Valencia Aviles MD

## 2022-05-25 NOTE — CARE COORDINATION
Discharge Planning. The SW spoke with the patient's Wife this morning regarding a previous recommendation from PT/OT recommending SNF. The SW informed the patient's wife there are two facilities currently accepting COVID+ patients and they are [facility] of [address] [place] [address]. The patient's wife expressed being okay with sending referral but wants to talk to her children. The patient wife expressed concern with the patient being discharge on 5/26/2022 with out having a decision with their children on what they would like to do with SNF vs HHC. Referrals sent to     [address] [place] [address] for a response   [facility]- Waiting for a response . Update   At 11:39am updated PT notes have been added and they are now recommending home with [address]. The SW called the patient wife again to inform her of this update. The patient's again expressed concerns with the patient being discharged possibly on 5/26/2022 stating the  would need additional care at home . The patient's wife stated she has been taking to [place] [employer] [facility] regarding Aides to come into the home but has nothing set in stone yet. The SW informed the patient's wife they could also pay privately for [address] for additional support at home. The patient's wife asked if the patient is discharged on 5/26/2022 and they are not ready can the discharged be stopped. The SW informed the patient wife if they believe once a discharge order is put in and they believe they are not ready for the patient to return home they can appeal the discharge. The patient's wife stated she will be coming in at 2:00pm on 5/25/2022 to talk to the SW further.      Electronically signed by EDUIN Dee on 5/25/2022 at 12:00 PM

## 2022-05-25 NOTE — PROGRESS NOTES
[facility]   Progress Note    5/24/2022    SUBJECTIVE:  Feels better. OBJECTIVE:    Physical Assessment:  Vitals:  /68   Pulse 78   Temp 97.4 °F (36.3 °C) (Temporal)   Resp 16   Ht 5' 10\" (1.778 m)   Wt 134 lb 4.2 oz (60.9 kg)   SpO2 97%   BMI 19.26 kg/m²    24HR INTAKE/OUTPUT:    Intake/Output Summary (Last 24 hours) at 5/24/2022 2013  Last data filed at 5/24/2022 1336  Gross per 24 hour   Intake --   Output 1200 ml   Net -1200 ml       CONSTITUTIONAL:  Awake, alert & oriented x3  RESPIRATORY:  No increased work of breathing, breath sounds decreased. CARDIOVASCULAR:  Cardiac S1/S2, RRR  GASTROINTESTINAL:  abdomen soft +BS x4, no hepatosplenomegaly  EXTREMITIES: Negative for Lower extremity edema    Labs Results:    CBC:   Recent Labs     05/22/22  1335 05/23/22  0442   WBC 9.4 8.9   HGB 12.1* 12.0*   HCT 37.6* 36.9*   MCV 96.5 93.6    125*     BMP:   Recent Labs     05/22/22  1335 05/23/22  0442 05/24/22  0510   * 134* 136   K 3.8 3.9 3.4*   CL 99 104 104   CO2 15* 19* 21   PHOS  --  3.0 2.8   BUN 22* 23* 21*   CREATININE 1.0 0.8 0.9     LIVER PROFILE:   Recent Labs     05/22/22  1335   AST 49*   ALT 27   LIPASE 24.0   BILITOT 0.8   ALKPHOS 140*     PT/INR:    Lab Results   Component Value Date    PROTIME 22.6 05/22/2022    PROTIME 13.1 02/15/2022    PROTIME 13.2 02/01/2022    INR 1.95 05/22/2022    INR 1.15 02/15/2022    INR 1.16 02/01/2022     PTT:    Lab Results   Component Value Date    APTT 38.4 05/22/2022    APTT 68.7 04/14/2022    APTT 51.1 04/14/2022     UA:No results for input(s): NITRITE, COLORU, PHUR, LABCAST, WBCUA, RBCUA, MUCUS, TRICHOMONAS, YEAST, BACTERIA, CLARITYU, SPECGRAV, LEUKOCYTESUR, UROBILINOGEN, BILIRUBINUR, BLOODU, GLUCOSEU, AMORPHOUS in the last 72 hours.     Invalid input(s): KETONESU  Magnesium:   Lab Results   Component Value Date    MG 1.80 05/24/2022    MG 1.90 02/02/2022    MG 1.90 01/16/2018     PORTER:  No results found for: Tay Smith, PORTER    RADIOLOGY:    XR CHEST PORTABLE    Result Date: 5/22/2022  EXAMINATION: ONE XRAY VIEW OF THE CHEST 5/22/2022 1:16 pm COMPARISON: February 1, 2022 HISTORY: ORDERING SYSTEM PROVIDED HISTORY: SOB TECHNOLOGIST PROVIDED HISTORY: Reason for exam:->SOB Reason for Exam: Fatigue ([facility] from home d/t weakness and frequent falls x 2 days. per EMS, also states some confusion. ) FINDINGS: The cardiomediastinal silhouette is mildly enlarged, stable. There is a right-sided port with distal tip at the cavoatrial junction. Patchy airspace disease involving the parahilar region of the right upper lobe is concerning for pneumonia. Left lung is clear. No pleural effusion or pneumothorax. 1. Suspected right upper lobe pneumonia. Radiographic follow-up recommended to ensure resolution. 2. Mild cardiomegaly, stable. New right-sided port with distal tip at the cavoatrial junction. CT CHEST ABDOMEN PELVIS W CONTRAST    Result Date: 5/19/2022  EXAM: CT CHEST ABDOMEN AND PELVIS INDICATION: Restaging. 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 COMPARISON: CT of the chest, abdomen, and pelvis 2/1/2022 and MRI of the right hip 3/31/2022. TECHNIQUE: Axial CT imaging obtained through the chest, abdomen and pelvis. Axial images and multiplanar reformatted images were reviewed. Up-to-date CT equipment and radiation dose reduction techniques were employed. IV Contrast: 80 mL Isovue-370. Oral Contrast: Yes. CT CHEST: Lungs and Pleura: Left upper lung pulmonary nodule axial image 18 has decreased in size compared to prior exam. Right upper lobe nodule axial image 26 has markedly decreased in size. Bilobed nodule in the anterior left upper lobe axial image 28 appears unchanged. Dominant nodule in the right lower lobe superior segment has nearly completely resolved. The central nodule along the posterior aspect of the inferior left hilum has significantly decreased in size.  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 Representative nodule which is new and image 58 of series 601 measures 10 mm. Multiple nodules in the left lung base have markedly decreased in size. Representative nodule in the left lower lobe image 87 measures 13 x 15 mm, previously measuring 16 x 15 mm. No pleural effusion. Mediastinum: Markedly improved nodular thickening along the esophagus. A subcarinal lymph node and gastroesophageal lymph nodes are markedly decreased in size. Lymph node on image 58 in the inferior mediastinum measures 16 x 11 mm, previously this measured 33 x 19 mm. Left paratracheal and right paratracheal lymph nodes decreased in size compared to prior exam. Lower Neck and Chest Wall: No axillary lymphadenopathy. No supraclavicular lymphadenopathy. Thyroid gland is unremarkable. Bones: No suspicious osseous lesion identified. CT ABDOMEN AND PELVIS: Upper Abdomen: Numerous hepatic metastasis which appear less distinct on the current exam. Lesion in the right lobe of liver image 95 measures 23 x 20 mm, present measuring 30 x 22 mm. Lesion in the left lobe of liver image 98 measures 26 x 19 mm, previously measuring 29 x 19 mm. Lesion in the inferior right lobe liver image 103 measures 24 x 21 mm, previously measuring 19 x 17 mm. No definite new liver lesion clearly identified. Spleen is unchanged. Pancreas is unremarkable. Mixed response of upper abdominal lymphadenopathy. There is a retrocaval lymph node axial image 117 which has increased in size showing 31 x 15 mm, previously measuring 30 x 9 mm. Aortocaval lymph node axial image 117 measures 20 x 14 mm, previously measuring 23 x 16 mm. Previous portal caval lymph node is no longer clearly measurable. A left paraceliac lymph node has decreased in size image 112 measuring 10 x 12 mm, previously measuring 20 x 28 mm.  Additional retroperitoneal lymph nodes appear similar in size or slightly decreased. No pancreatic abnormality. Retroperitoneum: No hydronephrosis. Stable adrenal glands. Bowel and Peritoneum: Nonobstructive bowel gas pattern. No free air. No significant free fluid. Extensive sigmoid diverticulosis. Extensive vascular calcifications of the aorta. Pelvis: No pelvic lymphadenopathy identified. Bladder is unremarkable. Prostate is mildly enlarged with calcifications. Bones: The lytic lesion in the right acetabulum is difficult to visualize. There is a small incomplete fracture along the roof of the acetabulum suggesting a pathologic fracture. Clinical correlation recommended. No new osseous lesion clearly identified. CHEST: 1. 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 2. Numerous previously identified nodules in the lungs bilaterally have decreased in size in the interval. Some of the nodules are no longer clearly visualized. The mediastinal lymphadenopathy has markedly decreased in the interval compatible with response to therapy. No progressive lymphadenopathy identified. Overall mixed response with new nodularity in the superior segment left lower lobe. 3. Marked improvement in nodular thickening of the mid and lower esophagus compared to previous exam. ABDOMEN/PELVIS: 1. New incomplete pathologic fracture along the roof of the right acetabulum. Correlate clinically. 2. Extensive hepatic metastatic disease with many of the nodules measuring stable or slightly decreased in size. A few nodules measure slightly larger. 3. Next response of upper abdominal lymphadenopathy with a few lymph nodes measuring slightly larger and others measuring decreased in size in comparison to prior exam. 4. Extensive diverticulosis without diverticulitis. 5. No new osseous lesion identified.        Current Medications   brimonidine  1 drop Right Eye TID    doxycycline hyclate  100 mg Oral 2 times per day    ferrous sulfate  325 mg Oral Daily with breakfast    cetirizine  5 mg Oral Daily    dorzolamide  1 drop Right Eye TID    cefTRIAXone (ROCEPHIN) IV  1,000 mg IntraVENous Q24H    sodium chloride flush  10 mL IntraVENous 2 times per day    apixaban  5 mg Oral BID    atenolol  50 mg Oral Daily    lisinopril  5 mg Oral Daily    pantoprazole  40 mg Oral QAM AC    atorvastatin  20 mg Oral Nightly        ASSESSMENT & PLAN:        17-year-old male with a history of atrial fibrillation, HTN, GERD, and CAD who has:     1. 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 - Last treatment of trastuzumab and pembrolizumab only was given on 3/29/2022.  - CT CAP on 5/19/2022 showed mixed response to treatment  -No inpatient oncological intervention  - will follow-up with Dr. Xiomara Wynne as outpatient to discuss further treatment plan     2. Right upper lobe pneumonia  - positive for COVID-19 infection  - on Zithromax and rocephin     3.  History of DVT  - on Kalyn Mcleod MD  [facility]

## 2022-05-25 NOTE — PROGRESS NOTES
Shift assessment complete; see flowsheet for details. Patient alert and oriented x4. Afib on tele, but patient has history of chronic afib. Rate controlled. Patient on room with oxygen sat at 98%. Denies any pain. Plan of care reviewed with patient. Patient verbalizes concern for diarrhea that he has been having throughout the day. Patient states he is unable to control bowels. Patient had bm in brief and is soft and formed.

## 2022-05-25 NOTE — PROGRESS NOTES
[facility]   Phone: [phone]    Physical Therapy    [] Initial Evaluation            [x] Daily Treatment Note         [] Discharge Summary      Patient: Viridiana Levi   : 1940   MRN: [id number]   Date of Service:  2022  Admitting Diagnosis: Sepsis [facility])  Current Admission Summary: Pt admitted for sepsis. Currently being treated for PNA, COVID+. Pt with a history of esophogeal cancer  Past Medical History:  has a past medical history of Allergic rhinitis, Anxiety state, Atrial fibrillation ([facility] [address] CAD (coronary artery disease), Cancer ([facility] [address] Cataract, Chronic ischemic heart disease, Esophageal reflux, Essential hypertension, Glaucoma, Influenza, Insomnia, and Other and unspecified hyperlipidemia. Past Surgical History:  has a past surgical history that includes Coronary angioplasty with stent; eye surgery; hernia repair; Colonoscopy; ERCP (2018); Cholecystectomy, laparoscopic (2018); Upper gastrointestinal endoscopy (N/A, 2022); Colonoscopy (N/A, 2022); and IR PORT PLACEMENT > 5 YEARS (2/15/2022). Discharge Recommendations: Viridiana Levi scored a 18/24 on the AM-PAC short mobility form. Current research shows that an AM-PAC score of 18 or greater is typically associated with a discharge to the patient's home setting. Based on the patient's AM-PAC score and their current functional mobility deficits, it is recommended that the patient have 2-3 sessions per week of Physical Therapy at d/c to increase the patient's independence. At this time, this patient demonstrates the endurance and safety to discharge home with Home PT and a follow up treatment frequency of 2-3x/wk. Please see assessment section for further patient specific details. If patient discharges prior to next session this note will serve as a discharge summary. Please see below for the latest assessment towards goals.      HOME HEALTH CARE: LEVEL 3 SAFETY     - Initial home health evaluation to occur within 24-48 hours, in patient home   - Therapy evaluations in home within 24-48 hours of discharge; including DME and home safety   - Frontload therapy 5 days, then 3x a week   - Therapy to evaluate if patient has [address] needs for personal care   -  evaluation within 24-48 hours, includes evaluation of resources and insurance to determine [place], [place], LTC, and Medicaid options     DME Required For Discharge: hospital bed Pt. Has all other necessary equipment, a hospital bed would be beneficial  Precautions/Restrictions: high fall risk, up as tolerated  Weight Bearing Restrictions: no restrictions     Required Braces/Orthotics: no braces required  Positional Restrictions:no positional restrictions    Pre-Admission Information   Lives With: spouse                  Type of Home: house  Home Layout: two level  Enter thru the basement  Home Access: 12 steps from basement step to enter with handrail. Handrails are located on Both side. Bathroom Layout: tub/shower unit  Toilet Height: elevated height  Bathroom Equipment: grab bars in shower, tub transfer bench, hand held shower head  Home Equipment: rolling walker, single point cane, manual wheelchair  Transfer Assistance: modified independent with use of grab bars from toilet  Ambulation Assistance:modified independent with use of RW. pt reports he was walking household distances  ADL Assistance: requires assistance with bathing, requires assistance with dressing  IADL Assistance: requires assistance with all homemaking tasks  Active :        []? Yes                 [x]? No  Recent Falls: pt went to [facility] after last admission, pt is currently active with home health care        Subjective  General: Pt supine in bed on arrival, reports having a bowel accident  Pain: 0/10, after ambulation pt reported mild pain in R groin, reports getting a pain in back after sitting.   Pt. Reports resolving this with taking a pain pill or lying down. Pain Interventions: not applicable       Functional Mobility  Bed Mobility  Supine to Sit: stand by assistance  Sit to Supine: stand by assistance  Scooting: stand by assistance  Comments: HOB elevated, use of handrail  Transfers  Sit to stand transfer: supervision  Stand to sit transfer: supervision  Comments: Use of RW, Toilet transfer was low surface and Pt. Required CGA for stand to toilet d/t impaired eccentric control. Ambulation  Surface:level surface  Assistive Device: rolling walker  Assistance: supervision  Distance: 25' x 1 and 120' x 1  Gait Mechanics: Decreased step length, decreased denise, increased thoracic kyphosis throughout gait cycle, steady  Comments:  No LOB with multiple turns, good safety awareness  Stair Mobility  Stair mobility not completed on this date. d/t Covid restrictions  Comments:   Wheelchair Mobility:  No w/c mobility completed on this date. Comments:  Balance  Static Sitting Balance: fair (+): maintains balance at SBA/supervision without use of UE support  Dynamic Sitting Balance: fair (+): maintains balance at SBA/supervision without use of UE support  Static Standing Balance: fair (+): maintains balance at SBA/supervision without use of UE support  Dynamic Standing Balance: fair (+): maintains balance at SBA/supervision without use of UE support  Comments:     Other Therapeutic Interventions  Seated LE exercises:  Marching x 10, LAQ x 10 and AP x 10; Bowel incontinence upon entering, Pericare and brief change completed by PT. Bowel incontinence with ambulation,  PT guided Pt. Through changing brief and pericare, Pt. Completed with S/CGA.   Functional Outcomes  AM-PAC Inpatient Mobility Raw Score : 18                Cognition  Overall Cognitive Status: Impaired   Increased time for communication, delayed initiation, emotional throughout the initial evaluation  Orientation:    alert and oriented x 4  Command Following: WFL      Education  Barriers To Learning: none  Patient Education: patient educated on goals, PT role and benefits, plan of care, general safety, discharge recommendations  Learning Assessment:  patient verbalizes understanding, would benefit from continued reinforcement    Assessment  Activity Tolerance: Pt tolerated the initial PT/OT evaluation well however was limited by emotional liability. Pt did not complain of SOB, lightheadedness, or dizziness throughout. Impairments Requiring Therapeutic Intervention: decreased functional mobility, decreased strength, decreased safety awareness, decreased cognition, decreased endurance, decreased sensation, decreased balance, decreased posture  Prognosis: fair  Clinical Assessment: Pt. Is improving with functional mobility. Pt. And wife want Pt. To go home with 24 hour S/A and Home PT. Continues to benefit from skilled PT to optimize independence. Safety Interventions: patient left in bed, bed alarm in place, call light within reach, gait belt and nurse notified    Plan  Frequency: 3-5 x/per week  Current Treatment Recommendations: strengthening, balance training, functional mobility training, gait training, stair training, endurance training, patient/caregiver education, home exercise program and safety education    Goals  Patient Goals:  To return home  Short Term Goals:  Time Frame: Upon discharge  Patient will complete bed mobility at [facility] independent   Patient will complete transfers at supervision   Patient will ambulate 100 ft with use of rolling walker at supervision  Patient will ascend/descend 12 stairs with (B) handrail at stand by assistance    Therapy Session Time      Individual Group Co-treatment   Time In 1035       Time Out 1120       Minutes 45         Timed Code Treatment Minutes:  45 Minutes  Total Treatment Minutes: 45       Electronically Signed By: Wellington Romero, [address] [facility] , [id number]

## 2022-05-26 LAB
ALBUMIN SERPL-MCNC: 2.4 G/DL (ref 3.4–5)
ANION GAP SERPL CALCULATED.3IONS-SCNC: 13 MMOL/L (ref 3–16)
BLOOD CULTURE, ROUTINE: NORMAL
BLOOD CULTURE, ROUTINE: NORMAL
BUN BLDV-MCNC: 22 MG/DL (ref 7–20)
CALCIUM SERPL-MCNC: 8.5 MG/DL (ref 8.3–10.6)
CHLORIDE BLD-SCNC: 105 MMOL/L (ref 99–110)
CO2: 18 MMOL/L (ref 21–32)
CREAT SERPL-MCNC: 0.8 MG/DL (ref 0.8–1.3)
CULTURE, BLOOD 2: NORMAL
GFR AFRICAN AMERICAN: >60
GFR NON-AFRICAN AMERICAN: >60
GLUCOSE BLD-MCNC: 104 MG/DL (ref 70–99)
L. PNEUMOPHILA SEROGP 1 UR AG: NORMAL
MAGNESIUM: 1.6 MG/DL (ref 1.8–2.4)
PHOSPHORUS: 3 MG/DL (ref 2.5–4.9)
POTASSIUM SERPL-SCNC: 4.1 MMOL/L (ref 3.5–5.1)
PROCALCITONIN: 0.45 NG/ML (ref 0–0.15)
SODIUM BLD-SCNC: 136 MMOL/L (ref 136–145)

## 2022-05-26 PROCEDURE — 6370000000 HC RX 637 (ALT 250 FOR IP): Performed by: INTERNAL MEDICINE

## 2022-05-26 PROCEDURE — 97116 GAIT TRAINING THERAPY: CPT

## 2022-05-26 PROCEDURE — 97110 THERAPEUTIC EXERCISES: CPT

## 2022-05-26 PROCEDURE — 83735 ASSAY OF MAGNESIUM: CPT

## 2022-05-26 PROCEDURE — 97530 THERAPEUTIC ACTIVITIES: CPT

## 2022-05-26 PROCEDURE — 6360000002 HC RX W HCPCS: Performed by: INTERNAL MEDICINE

## 2022-05-26 PROCEDURE — 6360000002 HC RX W HCPCS: Performed by: PHYSICIAN ASSISTANT

## 2022-05-26 PROCEDURE — 84145 PROCALCITONIN (PCT): CPT

## 2022-05-26 PROCEDURE — 2060000000 HC ICU INTERMEDIATE R&B

## 2022-05-26 PROCEDURE — 36415 COLL VENOUS BLD VENIPUNCTURE: CPT

## 2022-05-26 PROCEDURE — 2580000003 HC RX 258: Performed by: INTERNAL MEDICINE

## 2022-05-26 PROCEDURE — 80069 RENAL FUNCTION PANEL: CPT

## 2022-05-26 RX ORDER — LOPERAMIDE HYDROCHLORIDE 2 MG/1
2 CAPSULE ORAL 4 TIMES DAILY PRN
Status: DISCONTINUED | OUTPATIENT
Start: 2022-05-26 | End: 2022-05-28 | Stop reason: HOSPADM

## 2022-05-26 RX ADMIN — DORZOLAMIDE HYDROCHLORIDE 1 DROP: 20 SOLUTION/ DROPS OPHTHALMIC at 09:00

## 2022-05-26 RX ADMIN — PANTOPRAZOLE SODIUM 40 MG: 40 TABLET, DELAYED RELEASE ORAL at 06:39

## 2022-05-26 RX ADMIN — LOPERAMIDE HYDROCHLORIDE 2 MG: 2 CAPSULE ORAL at 21:17

## 2022-05-26 RX ADMIN — LISINOPRIL 5 MG: 5 TABLET ORAL at 08:55

## 2022-05-26 RX ADMIN — ATORVASTATIN CALCIUM 20 MG: 20 TABLET, FILM COATED ORAL at 21:17

## 2022-05-26 RX ADMIN — DORZOLAMIDE HYDROCHLORIDE 1 DROP: 20 SOLUTION/ DROPS OPHTHALMIC at 14:18

## 2022-05-26 RX ADMIN — OXYCODONE HYDROCHLORIDE AND ACETAMINOPHEN 1 TABLET: 10; 325 TABLET ORAL at 15:36

## 2022-05-26 RX ADMIN — DORZOLAMIDE HYDROCHLORIDE 1 DROP: 20 SOLUTION/ DROPS OPHTHALMIC at 21:17

## 2022-05-26 RX ADMIN — DOXYCYCLINE HYCLATE 100 MG: 100 TABLET, COATED ORAL at 08:55

## 2022-05-26 RX ADMIN — APIXABAN 5 MG: 5 TABLET, FILM COATED ORAL at 08:55

## 2022-05-26 RX ADMIN — ATENOLOL 50 MG: 50 TABLET ORAL at 08:55

## 2022-05-26 RX ADMIN — BRIMONIDINE TARTRATE 1 DROP: 2 SOLUTION OPHTHALMIC at 21:17

## 2022-05-26 RX ADMIN — Medication 1000 MG: at 14:18

## 2022-05-26 RX ADMIN — MAGNESIUM SULFATE HEPTAHYDRATE 2000 MG: 40 INJECTION, SOLUTION INTRAVENOUS at 09:00

## 2022-05-26 RX ADMIN — APIXABAN 5 MG: 5 TABLET, FILM COATED ORAL at 21:17

## 2022-05-26 RX ADMIN — BRIMONIDINE TARTRATE 1 DROP: 2 SOLUTION OPHTHALMIC at 09:00

## 2022-05-26 RX ADMIN — Medication 10 ML: at 21:20

## 2022-05-26 RX ADMIN — DOXYCYCLINE HYCLATE 100 MG: 100 TABLET, COATED ORAL at 21:17

## 2022-05-26 RX ADMIN — Medication 10 ML: at 09:02

## 2022-05-26 RX ADMIN — BRIMONIDINE TARTRATE 1 DROP: 2 SOLUTION OPHTHALMIC at 14:18

## 2022-05-26 RX ADMIN — ZOLPIDEM TARTRATE 2.5 MG: 5 TABLET ORAL at 21:17

## 2022-05-26 RX ADMIN — CETIRIZINE HYDROCHLORIDE 5 MG: 10 TABLET, FILM COATED ORAL at 08:55

## 2022-05-26 ASSESSMENT — PAIN SCALES - GENERAL
PAINLEVEL_OUTOF10: 0
PAINLEVEL_OUTOF10: 4
PAINLEVEL_OUTOF10: 0

## 2022-05-26 ASSESSMENT — PAIN SCALES - WONG BAKER
WONGBAKER_NUMERICALRESPONSE: 0

## 2022-05-26 ASSESSMENT — PAIN DESCRIPTION - LOCATION: LOCATION: BACK

## 2022-05-26 NOTE — PROGRESS NOTES
Precertification with Kiana initiated for [facility] as of 1230. Auth ID#: [id number]. MIREYA Tobias updated. Will continue to follow as needed.      Electronically signed by Radha Schaefer RN on 5/26/22 at 12:40 PM EDT

## 2022-05-26 NOTE — CARE COORDINATION
Discharge Planning.     -The SW spoke with the patient and his wife regarding discharge planning. The SW informed the patient and his spouse that recent PT notes have recommended Home with Health Care. The patient is active with [facility] and can resume services at discharge [facility] from [address]). The patient expressed he feels unable to go home do to his wife unable to care for him. The SW discussed options such as [facility] [employer] and [facility] who can assist in the community with SNF placement if they feel once the patient gets home that he needs additional care. -The patient wife stated she will be going to go see [address] at [facility] today at [address] spoke with Young Maldonado the admissions from [address] [place] [address]  who stated the patient pre-cert could be approved do to being Covid+. -CM will begin pre-cert for [address] [place] [address] on 5/26/2022.    -The patient wife has called and filled appeal for discharge with Gerilyn Cockayne.      Electronically signed by EDUIN Maria on 5/26/2022 at 11:37 AM

## 2022-05-26 NOTE — PROGRESS NOTES
MD Jose M Pino MD Andrey Ford, MD               Office: [phone]                      Fax: [phone]             [address] [place] [address]                   NEPHROLOGY INPATIENT PROGRESS NOTE:     PATIENT NAME: Sadia Love  : 1940  MRN: [id number]       RECOMMENDATIONS:   - NS at 100 cc/h -stoped  - more PO hydration    - K repletion - better (Mag WNL)  - COVID mx   -  abx for PNA  - Na-Phos repletion   - monitor PVR w/ bladder scan for stallings insertion need. D/C plan from renal stand point: stable , as improving   have d/w pt, his wife,       IMPRESSION:       Admitted for:  Sepsis ([facility] [address] [A41.9]  Pneumonia of right upper lobe due to infectious organism [J18.9]  COVID-19 [U07.1]      Hyponatremia - worsened chronic on admission  Slow improvement to stable   - BL Na -low 130s   -: Etiology - likely hypovolemia + underlying mild SIADH type effect w/ PNA , COVID-19 w/ hypotension    Associated problems:   - Volume status: hypo-volemic  : BP: hypotension  - renal fx WNL  - Electrolytes: hypokalemia   Hypophosphatemia     - Acid-Base: acidosis - high AGMA w/ lactic acidosis   - Anemia: of chronic disease - mild        Other major problems: Management per primary and other consulting teams.   //   Hospital Problems           Last Modified POA    * (Principal) Sepsis ([facility] [address] 2022 Yes        : other supportive care :   - Check daily renal function panel with electrolytes-phosphorus  - Strict monitoring of I/Os, daily weight  - Renal feeds/diet  - Current medications reviewed. Please refer to the orders. High Complexity. Multiple complex problems. Discussed with patient and treatment team-    Time spent > 30 (~35) minutes. Thank you for allowing me to participate in this patient's care. Please do not hesitate to contact me anytime. We will follow along with you.        Jason Cam MD,  [facility] [address] [facility]: [phone] Miranda Wells MD, 1 drop at 05/26/22 0900    doxycycline hyclate (VIBRA-TABS) tablet 100 mg, 100 mg, Oral, 2 times per day, Randy Araujo MD, 100 mg at 05/26/22 0855    ferrous sulfate (IRON 325) tablet 325 mg, 325 mg, Oral, Daily with breakfast, Randy Araujo MD, 325 mg at 05/24/22 0816    cetirizine (ZYRTEC) tablet 5 mg, 5 mg, Oral, Daily, Randy Araujo MD, 5 mg at 05/26/22 0855    oxyCODONE-acetaminophen (PERCOCET)  MG per tablet 1 tablet, 1 tablet, Oral, Q8H PRN, Randy Araujo MD    dorzolamide (TRUSOPT) 2 % ophthalmic solution 1 drop, 1 drop, Right Eye, TID, Sophie Mercado MD, 1 drop at 05/26/22 0900    cefTRIAXone (ROCEPHIN) 1000 mg in sterile water 10 mL IV syringe, 1,000 mg, IntraVENous, Q24H, 2135 Alicia Cox, PA-C, 1,000 mg at 05/25/22 1455    sodium chloride flush 0.9 % injection 10 mL, 10 mL, IntraVENous, 2 times per day, Tatyana Samaniego MD, 10 mL at 05/26/22 0902    sodium chloride flush 0.9 % injection 10 mL, 10 mL, IntraVENous, PRN, Tatyana Samaniego MD    0.9 % sodium chloride infusion, , IntraVENous, PRN, Tatyana Samaniego MD    potassium chloride (KLOR-CON M) extended release tablet 40 mEq, 40 mEq, Oral, PRN, 40 mEq at 05/24/22 1259 **OR** potassium bicarb-citric acid (EFFER-K) effervescent tablet 40 mEq, 40 mEq, Oral, PRN **OR** potassium chloride 10 mEq/100 mL IVPB (Peripheral Line), 10 mEq, IntraVENous, PRN, Tatyana Samaniego MD    potassium chloride 10 mEq/100 mL IVPB (Peripheral Line), 10 mEq, IntraVENous, PRN, Tatyana Samaniego MD    magnesium sulfate 2000 mg in 50 mL IVPB premix, 2,000 mg, IntraVENous, PRN, Tatyana Samaniego MD, Last Rate: 25 mL/hr at 05/26/22 0900, 2,000 mg at 05/26/22 0900    promethazine (PHENERGAN) tablet 12.5 mg, 12.5 mg, Oral, Q6H PRN **OR** [DISCONTINUED] ondansetron (ZOFRAN) injection 4 mg, 4 mg, IntraVENous, Q6H PRN, Tatyana Samaniego MD    magnesium hydroxide (MILK OF MAGNESIA) 400 MG/5ML suspension 30 mL, 30 mL, Oral, Daily PRN, MD Jenni Montesinos acetaminophen (TYLENOL) tablet 650 mg, 650 mg, Oral, Q6H PRN **OR** acetaminophen (TYLENOL) suppository 650 mg, 650 mg, Rectal, Q6H PRN, Jesus Calderon MD    apixaban (ELIQUIS) tablet 5 mg, 5 mg, Oral, BID, Jesus Calderon MD, 5 mg at 05/26/22 0855    atenolol (TENORMIN) tablet 50 mg, 50 mg, Oral, Daily, Jesus Calderon MD, 50 mg at 05/26/22 0855    lisinopril (PRINIVIL;ZESTRIL) tablet 5 mg, 5 mg, Oral, Daily, Jesus Calderon MD, 5 mg at 05/26/22 0855    pantoprazole (PROTONIX) tablet 40 mg, 40 mg, Oral, QAM AC, Lazaro Murray MD, 40 mg at 05/26/22 3182    atorvastatin (LIPITOR) tablet 20 mg, 20 mg, Oral, Nightly, Lazaro Murray MD, 20 mg at 05/25/22 2112    zolpidem (AMBIEN) tablet 2.5 mg, 2.5 mg, Oral, Nightly PRN, Jesus Calderon MD, 2.5 mg at 05/23/22 2159       =======================================================================================     PHYSICAL EXAM:  Recent vital signs and recent I/Os reviewed by me. Wt Readings from Last 3 Encounters:   05/26/22 132 lb 15 oz (60.3 kg)   04/15/22 124 lb 11.2 oz (56.6 kg)   02/15/22 121 lb 4.1 oz (55 kg)     BP Readings from Last 3 Encounters:   05/26/22 131/80   04/22/22 121/77   03/02/22 (!) 144/71     Patient Vitals for the past 24 hrs:   BP Temp Temp src Pulse Resp SpO2 Weight   05/26/22 0845 131/80 96.9 °F (36.1 °C) Temporal -- 18 94 % --   05/26/22 0448 -- -- -- -- -- -- 132 lb 15 oz (60.3 kg)   05/26/22 0400 121/78 97 °F (36.1 °C) Temporal 85 18 -- --   05/26/22 0000 (!) 109/55 97 °F (36.1 °C) Temporal 69 18 -- --   05/25/22 2030 -- -- -- 74 -- -- --   05/25/22 2000 128/69 (!) 96.7 °F (35.9 °C) Temporal 86 18 96 % --   05/25/22 1600 -- -- -- 74 -- -- --   05/25/22 1400 -- -- -- 76 -- -- --   05/25/22 1200 -- 98.6 °F (37 °C) Temporal 69 -- -- --       Intake/Output Summary (Last 24 hours) at 5/26/2022 1003  Last data filed at 5/26/2022 0854  Gross per 24 hour   Intake --   Output 950 ml   Net -950 ml         Physical Exam  Vitals reviewed.    Constitutional: General: He is not in acute distress. Remains on RA      Appearance: Normal appearance. He is ill-appearing. HENT:      Head: Normocephalic and atraumatic. Right Ear: External ear normal.      Left Ear: External ear normal.      Nose: Nose normal.      Mouth/Throat:      Mouth: Mucous membranes are moist. Mucous membranes are not dry. Eyes:      General: No scleral icterus. Conjunctiva/sclera: Conjunctivae normal.   Neck:      Vascular: No JVD. Cardiovascular:      Rate and Rhythm: Normal rate and regular rhythm. Heart sounds: S1 normal and S2 normal.   Pulmonary:      Effort: Pulmonary effort is normal. No respiratory distress. Breath sounds: Rhonchi present. Abdominal:      General: Bowel sounds are normal. There is no distension. Musculoskeletal:         General: No swelling or deformity. Cervical back: Normal range of motion and neck supple. Skin:     General: Skin is dry. Coloration: Skin is not jaundiced. Neurological:      Mental Status: He is alert and oriented to person, place, and time. Mental status is at baseline. Psychiatric:         Mood and Affect: Mood normal.         Behavior: Behavior normal.          =======================================================================================     DATA:  Diagnostic tests reviewed by me for today's visit:   (AS NEEDED FOR MY EVALUATION AND MANAGEMENT). No results for input(s): WBC, HEMOGLOBIN, HCT, PLT in the last 72 hours.   Iron Saturation:  No components found for: PERCENTFE  FERRITIN:    Lab Results   Component Value Date    FERRITIN 29.2 02/01/2022     IRON:    Lab Results   Component Value Date    IRON 27 02/01/2022     TIBC:    Lab Results   Component Value Date    TIBC 267 02/01/2022       Recent Labs     05/24/22  0510 05/25/22  0542 05/26/22  0320    134* 136   K 3.4* 4.2 4.1    104 105   CO2 21 21 18*   BUN 21* 24* 22*   CREATININE 0.9 0.8 0.8     Recent Labs 05/24/22  0510 05/25/22  0542 05/26/22  0320   CALCIUM 8.3 8.6 8.5   MG 1.80  --  1.60*   PHOS 2.8 2.3* 3.0     No results for input(s): PH, PCO2, PO2 in the last 72 hours.     Invalid input(s): Stas Andrew    ABG:  No results found for: PH, PCO2, PO2, HCO3, BE, THGB, TCO2, O2SAT  VBG:  No results found for: PHVEN, ZFC5BAI, BEVEN, S4MIERYE    LDH:    Lab Results   Component Value Date     02/02/2022     Uric Acid:    Lab Results   Component Value Date    LABURIC 3.6 05/22/2022       PT/INR:    Lab Results   Component Value Date    PROTIME 22.6 05/22/2022    INR 1.95 05/22/2022     Warfarin PT/INR:  No components found for: Jeremiah Sharma  PTT:    Lab Results   Component Value Date    APTT 38.4 05/22/2022   [APTT}  Last 3 Troponin:    Lab Results   Component Value Date    TROPONINI 0.02 05/22/2022    TROPONINI 0.03 02/01/2022    TROPONINI <0.01 04/28/2021       U/A:    Lab Results   Component Value Date    COLORU Yellow 05/24/2022    PROTEINU TRACE 05/24/2022    PHUR 6.5 05/24/2022    WBCUA 1 05/24/2022    RBCUA 1 05/24/2022    BACTERIA None Seen 05/24/2022    CLARITYU Clear 05/24/2022    SPECGRAV 1.015 05/24/2022    LEUKOCYTESUR Negative 05/24/2022    UROBILINOGEN 1.0 05/24/2022    BILIRUBINUR Negative 05/24/2022    BLOODU Negative 05/24/2022    GLUCOSEU Negative 05/24/2022     Microalbumen/Creatinine ratio:  No components found for: RUCREAT  24 Hour Urine for Protein:  No components found for: RAWUPRO, UHRS3, OZCF82WX, UTV3  24 Hour Urine for Creatinine Clearance:  No components found for: CREAT4, UHRS10, UTV10  Urine Toxicology:  No components found for: IAMMENTA, IBARBIT, IBENZO, ICOCAINE, IMARTHC, IOPIATES, IPHENCYC    HgBA1c:  No results found for: LABA1C  RPR:  No results found for: RPR  HIV:  No results found for: HIV  PORTER:  No results found for: ANATITER, PORTER  RF:  No results found for: RF  DSDNA:  No components found for: DNA  AMYLASE:    Lab Results   Component Value Date    AMYLASE 168 01/17/2018     LIPASE:    Lab Results   Component Value Date    LIPASE 24.0 05/22/2022     Fibrinogen Level:  No components found for: FIB       BELOW MENTIONED RADIOLOGY STUDY RESULTS BY ME (AS NEEDED FOR MY EVALUATION AND MANAGEMENT). XR CHEST PORTABLE    Result Date: 5/22/2022  EXAMINATION: ONE XRAY VIEW OF THE CHEST 5/22/2022 1:16 pm COMPARISON: February 1, 2022 HISTORY: ORDERING SYSTEM PROVIDED HISTORY: SOB TECHNOLOGIST PROVIDED HISTORY: Reason for exam:->SOB Reason for Exam: Fatigue ([facility] from home d/t weakness and frequent falls x 2 days. per EMS, also states some confusion. ) FINDINGS: The cardiomediastinal silhouette is mildly enlarged, stable. There is a right-sided port with distal tip at the cavoatrial junction. Patchy airspace disease involving the parahilar region of the right upper lobe is concerning for pneumonia. Left lung is clear. No pleural effusion or pneumothorax. 1. Suspected right upper lobe pneumonia. Radiographic follow-up recommended to ensure resolution. 2. Mild cardiomegaly, stable. New right-sided port with distal tip at the cavoatrial junction. CT CHEST ABDOMEN PELVIS W CONTRAST    Result Date: 5/19/2022  EXAM: CT CHEST ABDOMEN AND PELVIS INDICATION: Restaging. 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 COMPARISON: CT of the chest, abdomen, and pelvis 2/1/2022 and MRI of the right hip 3/31/2022. TECHNIQUE: Axial CT imaging obtained through the chest, abdomen and pelvis. Axial images and multiplanar reformatted images were reviewed. Up-to-date CT equipment and radiation dose reduction techniques were employed. IV Contrast: 80 mL Isovue-370. Oral Contrast: Yes. CT CHEST: Lungs and Pleura: Left upper lung pulmonary nodule axial image 18 has decreased in size compared to prior exam. Right upper lobe nodule axial image 26 has markedly decreased in size. Bilobed nodule in the anterior left upper lobe axial image 28 appears unchanged.  Dominant lymph node is no longer clearly measurable. A left paraceliac lymph node has decreased in size image 112 measuring 10 x 12 mm, previously measuring 20 x 28 mm. Additional retroperitoneal lymph nodes appear similar in size or slightly decreased. No pancreatic abnormality. Retroperitoneum: No hydronephrosis. Stable adrenal glands. Bowel and Peritoneum: Nonobstructive bowel gas pattern. No free air. No significant free fluid. Extensive sigmoid diverticulosis. Extensive vascular calcifications of the aorta. Pelvis: No pelvic lymphadenopathy identified. Bladder is unremarkable. Prostate is mildly enlarged with calcifications. Bones: The lytic lesion in the right acetabulum is difficult to visualize. There is a small incomplete fracture along the roof of the acetabulum suggesting a pathologic fracture. Clinical correlation recommended. No new osseous lesion clearly identified. CHEST: 1. 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 2. Numerous previously identified nodules in the lungs bilaterally have decreased in size in the interval. Some of the nodules are no longer clearly visualized. The mediastinal lymphadenopathy has markedly decreased in the interval compatible with response to therapy. No progressive lymphadenopathy identified. Overall mixed response with new nodularity in the superior segment left lower lobe. 3. Marked improvement in nodular thickening of the mid and lower esophagus compared to previous exam. ABDOMEN/PELVIS: 1. New incomplete pathologic fracture along the roof of the right acetabulum. Correlate clinically. 2. Extensive hepatic metastatic disease with many of the nodules measuring stable or slightly decreased in size. A few nodules measure slightly larger.  3. Next response of upper abdominal lymphadenopathy with a few lymph nodes measuring slightly larger and others measuring decreased in size in comparison to prior exam. 4. Extensive diverticulosis without diverticulitis. 5. No new osseous lesion identified.

## 2022-05-26 NOTE — PROGRESS NOTES
Nutrition Note    RECOMMENDATIONS  1. PO Diet: Continue current diet   2. ONS: Continue Ensure Enlive BID  3. Nutrition Support: None      NUTRITION ASSESSMENT   Pt remains in droplet plus isolation for Covid-19, spoke with pt over the phone. Pt reports good appetite and good PO intake at most meals, though there have been a few meals where he hasn't liked the food. PO intake ranges from 26-75% of meals depending on if he likes them. Pt does like Ensure Enlive and is drinking 100% BID. Actual weight 132 lbs noted, indicating a 33 lb (20%) weight loss over the past 13 months. Will continue to monitor for consistently adequate PO intake.  Nutrition Related Findings: Mg+ 1.6. LBM 5/26. +1 pitting BLE edema.  Wounds: None   Nutrition Education:  Education not indicated    Nutrition Goals: PO intake 50% or greater     MALNUTRITION ASSESSMENT   Malnutrition Status: Insufficient data (Droplet plus isolation; can't conduct NFPE)    NUTRITION DIAGNOSIS   · Unintended weight loss related to catabolic illness as evidenced by weight loss greater than or equal to 20% in 1 year    CURRENT NUTRITION THERAPIES  ADULT DIET; Regular  ADULT ORAL NUTRITION SUPPLEMENT; Dinner, Breakfast; Standard High Calorie/High Protein Oral Supplement     PO Intake: 26-50%,51-75%   PO Supplement Intake:%    ANTHROPOMETRICS   Current Height: 5' 10\" (177.8 cm)   Current Weight: 132 lb 15 oz (60.3 kg)     Ideal Body Weight (IBW): 166 lbs  (75 kg)        BMI: 19.1      COMPARATIVE STANDARDS  Energy (kcal):  8030-9405     Protein (g):  72-90 grams       Fluid (mL/day):  2813-7186 mL    The patient will be monitored per nutrition standards of care. Consult dietitian if additional nutrition interventions are needed prior to RD reassessment.      Karen Recinos, [address], [place]    Contact: [phone]

## 2022-05-26 NOTE — PROGRESS NOTES
[facility]   Phone: [phone]    Physical Therapy    [] Initial Evaluation            [x] Daily Treatment Note         [] Discharge Summary      Patient: Rylie Morel   : 1940   MRN: [id number]   Date of Service:  2022  Admitting Diagnosis: Sepsis [facility])  Current Admission Summary: Pt admitted for sepsis. Currently being treated for PNA, COVID+. Pt with a history of esophogeal cancer  Past Medical History:  has a past medical history of Allergic rhinitis, Anxiety state, Atrial fibrillation ([facility] [address] CAD (coronary artery disease), Cancer ([facility] [address] Cataract, Chronic ischemic heart disease, Esophageal reflux, Essential hypertension, Glaucoma, Influenza, Insomnia, and Other and unspecified hyperlipidemia. Past Surgical History:  has a past surgical history that includes Coronary angioplasty with stent; eye surgery; hernia repair; Colonoscopy; ERCP (2018); Cholecystectomy, laparoscopic (2018); Upper gastrointestinal endoscopy (N/A, 2022); Colonoscopy (N/A, 2022); and IR PORT PLACEMENT > 5 YEARS (2/15/2022). Discharge Recommendations: Rylie Morel scored a 18/24 on the AM-PAC short mobility form. Current research shows that an AM-PAC score of 18 or greater is typically associated with a discharge to the patient's home setting. Based on the patient's AM-PAC score and their current functional mobility deficits, it is recommended that the patient have 2-3 sessions per week of Physical Therapy at d/c to increase the patient's independence. At this time, this patient demonstrates the endurance and safety to discharge home with Home PT and a follow up treatment frequency of 2-3x/wk. Please see assessment section for further patient specific details. If patient discharges prior to next session this note will serve as a discharge summary. Please see below for the latest assessment towards goals.      HOME HEALTH CARE: LEVEL 3 SAFETY     - Initial home health evaluation to occur within 24-48 hours, in patient home   - Therapy evaluations in home within 24-48 hours of discharge; including DME and home safety   - Frontload therapy 5 days, then 3x a week   - Therapy to evaluate if patient has [address] needs for personal care   -  evaluation within 24-48 hours, includes evaluation of resources and insurance to determine [place], [place], LTC, and Medicaid options     DME Required For Discharge: no DME required at discharge - pt has needed equipment at home. Precautions/Restrictions: high fall risk, up as tolerated  Weight Bearing Restrictions: no restrictions     Required Braces/Orthotics: no braces required  Positional Restrictions:no positional restrictions    Pre-Admission Information   Lives With: spouse                  Type of Home: house  Home Layout: two level  Enter thru the basement  Home Access: 12 steps from basement step to enter with handrail. Handrails are located on Both side. Bathroom Layout: tub/shower unit  Toilet Height: elevated height  Bathroom Equipment: grab bars in shower, tub transfer bench, hand held shower head  Home Equipment: rolling walker, single point cane, manual wheelchair  Transfer Assistance: modified independent with use of grab bars from toilet  Ambulation Assistance:modified independent with use of RW. pt reports he was walking household distances  ADL Assistance: requires assistance with bathing, requires assistance with dressing  IADL Assistance: requires assistance with all homemaking tasks  Active :        []? Yes                 [x]? No  Recent Falls: pt went to [facility] after last admission, pt is currently active with home health care        Subjective  General: Pt supine in bed upon arrival. Pt reporting he has to urinate, denies pain at this time. Pt did report R groin soreness initially after standing, then it eased with activity. He reports his is baseline for him.    Pain: 0/10 at rest.   Pain Interventions: repositioned        Functional Mobility  Bed Mobility  Supine to Sit: supervision  Scooting: supervision  Comments: HOB elevated, use of handrail. VCs and additional time needed to complete task. Transfers  Sit to stand transfer: supervision  Stand to sit transfer: supervision  Comments: Transfers from EOB, toilet, recliner chair. VCs for hand placement. Ambulation  Surface:level surface  Assistive Device: rolling walker  Assistance: supervision  Distance: 15 ft x 2, then 120 ft.  Gait Mechanics: narrow BRYSON, decreased step length and height, slight trunk flex VCs for upright standing posture. Comments: Pt amb to bathroom and urinated. Pt able to perform his own hygiene. Pt then amb to sink for hand washing and stood for 5 min. Before returning to chair. Assisted to wali shoes, then amb to sink and stood for 15 min to brush teeth and comb hair with supervision. Pt amb in room before returning to sit in chair. Total standing time for 2nd walk approx 20 min. Tolerated well without issue. Stair Mobility  Stair mobility not completed on this date. d/t Covid restrictions  Comments:   Wheelchair Mobility:  No w/c mobility completed on this date. Comments:  Balance  Static Sitting Balance: good: independent with functional balance in unsupported position  Dynamic Sitting Balance: fair (+): maintains balance at SBA/supervision without use of UE support  Static Standing Balance: fair (-): maintains balance at supervision with use of UE support  Dynamic Standing Balance: fair (-): maintains balance at supervision with use of UE support  Comments: RW use in standing. Other Therapeutic Interventions  Seated LE exercises:  Marching x 10, LAQ x 10 and AP x 10; Standing and seated ADLs, toileting, dressing.   Functional Outcomes  AM-PAC Inpatient Mobility Raw Score : 18                Cognition  Overall Cognitive Status: Impaired   Increased time for communication and tasks, delayed initiation  Orientation:    alert and oriented x 4  Command Following:   [facility]      Education  Barriers To Learning: none  Patient Education: patient educated on goals, PT role and benefits, plan of care, general safety, discharge recommendations  Learning Assessment:  patient verbalizes understanding, would benefit from continued reinforcement    Assessment  Activity Tolerance: Tolerated well. Impairments Requiring Therapeutic Intervention: decreased functional mobility, decreased strength, decreased safety awareness, decreased cognition, decreased endurance, decreased sensation, decreased balance, decreased posture  Prognosis: fair  Clinical Assessment: Pt supervision with bed mob, transfers and gait with RW. Pt seems to be functioning at/near his prior level. Pt voicing concern about going home because he has diarrhea and his wife is not able to help him get cleaned up. Pt is capable of performing his how hygiene and recommended use of brief at home. Nursing made aware. Pt would benefit from skilled PT services to strength and mobility issues. Safety Interventions: patient left in chair, chair alarm in place, call light within reach, gait belt and nurse notified    Plan  Frequency: 3-5 x/per week  Current Treatment Recommendations: strengthening, balance training, functional mobility training, gait training, stair training, endurance training, patient/caregiver education, home exercise program and safety education    Goals  Patient Goals:  To return home  Short Term Goals:  Time Frame: Upon discharge (no goals met in full this date)  Patient will complete bed mobility at [facility] independent   Patient will complete transfers at supervision   Patient will ambulate 100 ft with use of rolling walker at supervision  Patient will ascend/descend 12 stairs with (B) handrail at stand by assistance    Therapy Session Time      Individual Group Co-treatment   Time In 1055       Time Out 1152       Minutes 57         Timed Code Treatment Minutes:  57 Minutes  Total Treatment Minutes: 57       Electronically Signed By: Juvenal Turcios, [address] [facility]

## 2022-05-26 NOTE — PROGRESS NOTES
Hospitalist Progress Note      PCP: Raliegh Boast, MD    Date of Admission: 5/22/2022    Hospital Course: This 68-year-old male with PMHx of stage IV metastatic distal esophageal carcinoma, DVT on Eliquis presented with generalized weakness. According to the patient he has been feeling weak for 3 to 4 days, he also has generalized weakness and has been having difficulty ambulation. Patient was also found positive for COVID-19. \"     Interval history  Pt seen and examined today. Overnight events noted, interval ancillary notes and labs reviewed. But with PT OT this morning. PT OT recommending home with home health care  Patient reported that he is not ready for discharge yet as his wife has not made arrangement to take care of him at home   Wife requested for SNF referral and planning to be discharged  denied cough, SOB, chest pain, nausea, vomiting or abdominal pain        Assessment/Plan:    Active Hospital Problems    Diagnosis     Sepsis ([facility] [address] [A41.9]      Priority: Medium     Assessment    Right upper lobe pneumonia: Likely secondary to COVID-19/superimposed bacterial pneumonia? Febrile with elevated procalcitonin on admission. 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 Last treatment of trastuzumab and pembrolizumab only was given on 3/29/2022. 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 New incomplete pathological fracture along the roof of right acetabulum  Oncology consulted:  Follow-up with Dr. Zuleima Jarvis as outpatient to discuss further treatment plan    Hx of DVT: On Eliquis    Hyponatremia: Likely secondary from hypovolemia  Nephrology on board: Appreciate their recs  Monitor sodium levels closely    Lactic acidosis likely secondary to dehydration: Resolved    Atrial fibrillation: Continue Eliquis and beta-blocker. Monitor telemetry    Elevated troponin likely demand ischemia: Remained flat    QTC prolongation: Avoid QTC prolonging drugs    Generalized weakness: PT OT consulted       DVT Prophylaxis: Eliquis  Diet: ADULT DIET;  Regular  ADULT ORAL NUTRITION SUPPLEMENT; Dinner, Breakfast; Standard High Calorie/High Protein Oral Supplement  Code Status: Full Code    PT/OT Eval Status: As needed    Dispo -Home in 1 to 2 days            Medications:  Reviewed    Infusion Medications    sodium chloride       Scheduled Medications    brimonidine  1 drop Right Eye TID    doxycycline hyclate  100 mg Oral 2 times per day    ferrous sulfate  325 mg Oral Daily with breakfast    cetirizine  5 mg Oral Daily    dorzolamide  1 drop Right Eye TID    cefTRIAXone (ROCEPHIN) IV  1,000 mg IntraVENous Q24H    sodium chloride flush  10 mL IntraVENous 2 times per day    apixaban  5 mg Oral BID    atenolol  50 mg Oral Daily    lisinopril  5 mg Oral Daily    pantoprazole  40 mg Oral QAM AC    atorvastatin  20 mg Oral Nightly     PRN Meds: oxyCODONE-acetaminophen, sodium chloride flush, sodium chloride, potassium chloride **OR** potassium alternative oral replacement **OR** potassium chloride, potassium chloride, magnesium sulfate, promethazine **OR** [DISCONTINUED] ondansetron, magnesium hydroxide, acetaminophen **OR** acetaminophen, zolpidem      Intake/Output Summary (Last 24 hours) at 5/26/2022 0845  Last data filed at 5/26/2022 2326  Gross per 24 hour   Intake --   Output 850 ml   Net -850 ml       Physical Exam Performed:    /78   Pulse 85   Temp 97 °F (36.1 °C) (Temporal)   Resp 18   Ht 5' 10\" (1.778 m)   Wt 132 lb 15 oz (60.3 kg)   SpO2 96%   BMI 19.07 kg/m²     General appearance: No apparent distress, appears stated age and cooperative. HEENT:MMM  Neck: Supple, with full range of motion. Trachea midline. Respiratory:  Normal respiratory effort. Diminished at bases  Cardiovascular: Regular rate and rhythm with normal S1/S2 without murmurs,+Port-a-cath. Abdomen: Soft, non-tender, non-distended with normal bowel sounds. Musculoskeletal: No clubbing, cyanosis or edema bilaterally. Skin: Skin color, texture, turgor normal.  No rashes or lesions. Neurologic:  Neurovascularly intact without any focal sensory/motor deficits. grossly non-focal.  Psychiatric: Alert and oriented, thought content appropriate, normal insight      Labs:   No results for input(s): WBC, HGB, HCT, PLT in the last 72 hours. Recent Labs     05/24/22  0510 05/25/22  0542 05/26/22  0320    134* 136   K 3.4* 4.2 4.1    104 105   CO2 21 21 18*   BUN 21* 24* 22*   CREATININE 0.9 0.8 0.8   CALCIUM 8.3 8.6 8.5   PHOS 2.8 2.3* 3.0     No results for input(s): AST, ALT, BILIDIR, BILITOT, ALKPHOS in the last 72 hours. No results for input(s): INR in the last 72 hours. No results for input(s): [place] in the last 72 hours. Urinalysis:      Lab Results   Component Value Date    NITRU Negative 05/24/2022    WBCUA 1 05/24/2022    BACTERIA None Seen 05/24/2022    RBCUA 1 05/24/2022    BLOODU Negative 05/24/2022    SPECGRAV 1.015 05/24/2022    GLUCOSEU Negative 05/24/2022       Radiology:  XR CHEST PORTABLE   Final Result   1. Suspected right upper lobe pneumonia. Radiographic follow-up recommended   to ensure resolution. 2. Mild cardiomegaly, stable. New right-sided port with distal tip at the   cavoatrial junction.                  Abel Fontanez MD

## 2022-05-27 LAB
ALBUMIN SERPL-MCNC: 2.6 G/DL (ref 3.4–5)
ANION GAP SERPL CALCULATED.3IONS-SCNC: 8 MMOL/L (ref 3–16)
BASOPHILS ABSOLUTE: 0 K/UL (ref 0–0.2)
BASOPHILS RELATIVE PERCENT: 0.4 %
BUN BLDV-MCNC: 28 MG/DL (ref 7–20)
CALCIUM SERPL-MCNC: 8.7 MG/DL (ref 8.3–10.6)
CHLORIDE BLD-SCNC: 107 MMOL/L (ref 99–110)
CO2: 22 MMOL/L (ref 21–32)
CREAT SERPL-MCNC: 0.9 MG/DL (ref 0.8–1.3)
EKG ATRIAL RATE: 117 BPM
EKG DIAGNOSIS: NORMAL
EKG Q-T INTERVAL: 432 MS
EKG QRS DURATION: 84 MS
EKG QTC CALCULATION (BAZETT): 560 MS
EKG R AXIS: 81 DEGREES
EKG T AXIS: 70 DEGREES
EKG VENTRICULAR RATE: 101 BPM
EOSINOPHILS ABSOLUTE: 0.1 K/UL (ref 0–0.6)
EOSINOPHILS RELATIVE PERCENT: 1.3 %
GFR AFRICAN AMERICAN: >60
GFR NON-AFRICAN AMERICAN: >60
GLUCOSE BLD-MCNC: 102 MG/DL (ref 70–99)
HCT VFR BLD CALC: 34.1 % (ref 40.5–52.5)
HEMOGLOBIN: 11.1 G/DL (ref 13.5–17.5)
LYMPHOCYTES ABSOLUTE: 1 K/UL (ref 1–5.1)
LYMPHOCYTES RELATIVE PERCENT: 21.3 %
MCH RBC QN AUTO: 30.4 PG (ref 26–34)
MCHC RBC AUTO-ENTMCNC: 32.6 G/DL (ref 31–36)
MCV RBC AUTO: 93.3 FL (ref 80–100)
MONOCYTES ABSOLUTE: 0.7 K/UL (ref 0–1.3)
MONOCYTES RELATIVE PERCENT: 13.6 %
NEUTROPHILS ABSOLUTE: 3.1 K/UL (ref 1.7–7.7)
NEUTROPHILS RELATIVE PERCENT: 63.4 %
PDW BLD-RTO: 16.1 % (ref 12.4–15.4)
PHOSPHORUS: 2.8 MG/DL (ref 2.5–4.9)
PLATELET # BLD: 154 K/UL (ref 135–450)
PMV BLD AUTO: 7.6 FL (ref 5–10.5)
POTASSIUM SERPL-SCNC: 4.9 MMOL/L (ref 3.5–5.1)
RBC # BLD: 3.65 M/UL (ref 4.2–5.9)
SODIUM BLD-SCNC: 137 MMOL/L (ref 136–145)
WBC # BLD: 4.9 K/UL (ref 4–11)

## 2022-05-27 PROCEDURE — 2580000003 HC RX 258: Performed by: INTERNAL MEDICINE

## 2022-05-27 PROCEDURE — 80069 RENAL FUNCTION PANEL: CPT

## 2022-05-27 PROCEDURE — 6370000000 HC RX 637 (ALT 250 FOR IP): Performed by: INTERNAL MEDICINE

## 2022-05-27 PROCEDURE — 85025 COMPLETE CBC W/AUTO DIFF WBC: CPT

## 2022-05-27 PROCEDURE — 36415 COLL VENOUS BLD VENIPUNCTURE: CPT

## 2022-05-27 PROCEDURE — 2060000000 HC ICU INTERMEDIATE R&B

## 2022-05-27 PROCEDURE — 6360000002 HC RX W HCPCS: Performed by: PHYSICIAN ASSISTANT

## 2022-05-27 RX ADMIN — FERROUS SULFATE TAB 325 MG (65 MG ELEMENTAL FE) 325 MG: 325 (65 FE) TAB at 09:32

## 2022-05-27 RX ADMIN — CETIRIZINE HYDROCHLORIDE 5 MG: 10 TABLET, FILM COATED ORAL at 09:32

## 2022-05-27 RX ADMIN — Medication 10 ML: at 09:34

## 2022-05-27 RX ADMIN — DOXYCYCLINE HYCLATE 100 MG: 100 TABLET, COATED ORAL at 21:15

## 2022-05-27 RX ADMIN — BRIMONIDINE TARTRATE 1 DROP: 2 SOLUTION OPHTHALMIC at 09:33

## 2022-05-27 RX ADMIN — Medication 10 ML: at 21:17

## 2022-05-27 RX ADMIN — PANTOPRAZOLE SODIUM 40 MG: 40 TABLET, DELAYED RELEASE ORAL at 05:46

## 2022-05-27 RX ADMIN — APIXABAN 5 MG: 5 TABLET, FILM COATED ORAL at 09:31

## 2022-05-27 RX ADMIN — BRIMONIDINE TARTRATE 1 DROP: 2 SOLUTION OPHTHALMIC at 15:20

## 2022-05-27 RX ADMIN — DORZOLAMIDE HYDROCHLORIDE 1 DROP: 20 SOLUTION/ DROPS OPHTHALMIC at 09:33

## 2022-05-27 RX ADMIN — ATORVASTATIN CALCIUM 20 MG: 20 TABLET, FILM COATED ORAL at 21:15

## 2022-05-27 RX ADMIN — ATENOLOL 50 MG: 50 TABLET ORAL at 09:32

## 2022-05-27 RX ADMIN — APIXABAN 5 MG: 5 TABLET, FILM COATED ORAL at 21:15

## 2022-05-27 RX ADMIN — ZOLPIDEM TARTRATE 2.5 MG: 5 TABLET ORAL at 21:15

## 2022-05-27 RX ADMIN — LOPERAMIDE HYDROCHLORIDE 2 MG: 2 CAPSULE ORAL at 21:15

## 2022-05-27 RX ADMIN — BRIMONIDINE TARTRATE 1 DROP: 2 SOLUTION OPHTHALMIC at 21:20

## 2022-05-27 RX ADMIN — DOXYCYCLINE HYCLATE 100 MG: 100 TABLET, COATED ORAL at 09:31

## 2022-05-27 RX ADMIN — DORZOLAMIDE HYDROCHLORIDE 1 DROP: 20 SOLUTION/ DROPS OPHTHALMIC at 21:20

## 2022-05-27 RX ADMIN — Medication 1000 MG: at 15:21

## 2022-05-27 RX ADMIN — DORZOLAMIDE HYDROCHLORIDE 1 DROP: 20 SOLUTION/ DROPS OPHTHALMIC at 15:20

## 2022-05-27 ASSESSMENT — PAIN SCALES - GENERAL
PAINLEVEL_OUTOF10: 0

## 2022-05-27 ASSESSMENT — PAIN SCALES - WONG BAKER
WONGBAKER_NUMERICALRESPONSE: 0

## 2022-05-27 NOTE — PROGRESS NOTES
Jhonny Alma, MD Audrea Bolognese, MD Leona Opitz, MD               Office: [phone]                      Fax: [phone]             [address] [place] [address]                   NEPHROLOGY INPATIENT PROGRESS NOTE:     PATIENT NAME: Jerrod Vang  : 1940  MRN: [id number]       RECOMMENDATIONS:   - NS at 100 cc/h - stoped  - more PO hydration    - K repletion - better (Mag WNL)  - COVID mx   - abx for PNA  - Na-Phos repletion      D/C plan from renal stand point: stable for discharge. Discussed with hospitalist - Dr Villatoro Countess:       Admitted for:  Sepsis ([facility] [address] [A41.9]  Pneumonia of right upper lobe due to infectious organism [J18.9]  COVID-19 [U07.1]      Hyponatremia - worsened chronic on admission - controlled now   Slow improvement to stable   - BL Na -low 130s   -: Etiology - likely hypovolemia + underlying mild SIADH type effect w/ PNA , COVID-19 w/ hypotension    Associated problems:   - Volume status: hypo-volemic  : BP: hypotension  - renal fx WNL  - Electrolytes: hypokalemia   Hypophosphatemia     - Acid-Base: acidosis - high AGMA w/ lactic acidosis   - Anemia: of chronic disease - mild        Other major problems: Management per primary and other consulting teams.   //   Hospital Problems           Last Modified POA    * (Principal) Sepsis ([facility] [address] 2022 Yes             Please refer to the orders. High Complexity. Multiple complex problems. Discussed with patient and treatment team-    Time spent > 30 (~35) minutes. Thank you for allowing me to participate in this patient's care. Please do not hesitate to contact me anytime. We will follow along with you.        Leona Opitz, MD,  [facility] [address] [place] [address] [place]: [phone] or Via [email or web address]  Fax: [phone]        ======================================================================================= =======================================================================================  SUBJECTIVE  Seen resting in bed   No active complaints   Remains on room air, low BP is better  No distress  Labs noted     Past medical, Surgical, Social, Family medical history reviewed by me. MEDICATIONS: reviewed by me. Medications Prior to Admission:  No current facility-administered medications on file prior to encounter. Current Outpatient Medications on File Prior to Encounter   Medication Sig Dispense Refill    oxyCODONE-acetaminophen (PERCOCET)  MG per tablet Take 1 tablet by mouth every 8 hours as needed for Pain.       apixaban (ELIQUIS) 5 MG TABS tablet Take 1 tablet by mouth 2 times daily 60 tablet 1    ferrous sulfate (IRON 325) 325 (65 Fe) MG tablet Take 325 mg by mouth daily (with breakfast)       Cholecalciferol (VITAMIN D3) 50 MCG (2000 UT) CAPS Take by mouth      Cholecalciferol (VITAMIN D3) 125 MCG (5000 UT) TABS Take by mouth      pantoprazole (PROTONIX) 40 MG tablet Take 1 tablet by mouth daily 30 tablet 0    loratadine (CLARITIN) 10 MG tablet Take 10 mg by mouth as needed       ondansetron (ZOFRAN) 4 MG tablet Take 1 tablet by mouth every 4 hours as needed for Nausea or Vomiting 15 tablet 0    atenolol (TENORMIN) 25 MG tablet Take 50 mg by mouth daily       ALPHAGAN P 0.1 % SOLN Place 1 drop into the left eye 3 times daily       dorzolamide (TRUSOPT) 2 % ophthalmic solution Place 1 drop into the right eye 3 times daily       lisinopril (PRINIVIL;ZESTRIL) 5 MG tablet Take 5 mg by mouth daily       simvastatin (ZOCOR) 40 MG tablet Take 40 mg by mouth nightly       zolpidem (AMBIEN) 5 MG tablet 2.5 mg.            Current Facility-Administered Medications:     loperamide (IMODIUM) capsule 2 mg, 2 mg, Oral, 4x Daily PRN, Amalia Edwards MD, 2 mg at 05/26/22 2117    brimonidine (ALPHAGAN) 0.2 % ophthalmic solution 1 drop, 1 drop, Right Eye, TID, Amalia Edwards MD, 1 drop at 05/26/22 2117    doxycycline hyclate (VIBRA-TABS) tablet 100 mg, 100 mg, Oral, 2 times per day, Zackery Garay MD, 100 mg at 05/26/22 2117    ferrous sulfate (IRON 325) tablet 325 mg, 325 mg, Oral, Daily with breakfast, Zackery Garay MD, 325 mg at 05/24/22 0816    cetirizine (ZYRTEC) tablet 5 mg, 5 mg, Oral, Daily, Zackery Garay MD, 5 mg at 05/26/22 0855    oxyCODONE-acetaminophen (PERCOCET)  MG per tablet 1 tablet, 1 tablet, Oral, Q8H PRN, Zackery Garay MD, 1 tablet at 05/26/22 1536    dorzolamide (TRUSOPT) 2 % ophthalmic solution 1 drop, 1 drop, Right Eye, TID, Alfreda Mitchell MD, 1 drop at 05/26/22 2117    cefTRIAXone (ROCEPHIN) 1000 mg in sterile water 10 mL IV syringe, 1,000 mg, IntraVENous, Q24H, 2135 Alicia Cox, PA-C, 1,000 mg at 05/26/22 1418    sodium chloride flush 0.9 % injection 10 mL, 10 mL, IntraVENous, 2 times per day, Harini Zimmerman MD, 10 mL at 05/26/22 2120    sodium chloride flush 0.9 % injection 10 mL, 10 mL, IntraVENous, PRN, Harini Zimmerman MD    0.9 % sodium chloride infusion, , IntraVENous, PRN, Harini Zimmerman MD    potassium chloride (KLOR-CON M) extended release tablet 40 mEq, 40 mEq, Oral, PRN, 40 mEq at 05/24/22 1259 **OR** potassium bicarb-citric acid (EFFER-K) effervescent tablet 40 mEq, 40 mEq, Oral, PRN **OR** potassium chloride 10 mEq/100 mL IVPB (Peripheral Line), 10 mEq, IntraVENous, PRN, Harini Zimmerman MD    potassium chloride 10 mEq/100 mL IVPB (Peripheral Line), 10 mEq, IntraVENous, PRN, Harini Zimmerman MD    magnesium sulfate 2000 mg in 50 mL IVPB premix, 2,000 mg, IntraVENous, PRN, Harini Zimmerman MD, Stopped at 05/26/22 1046    promethazine (PHENERGAN) tablet 12.5 mg, 12.5 mg, Oral, Q6H PRN **OR** [DISCONTINUED] ondansetron (ZOFRAN) injection 4 mg, 4 mg, IntraVENous, Q6H PRN, Harini Zimmerman MD    magnesium hydroxide (MILK OF MAGNESIA) 400 MG/5ML suspension 30 mL, 30 mL, Oral, Daily PRN, Harini Zimmerman MD    acetaminophen (TYLENOL) tablet 650 mg, 650 mg, Oral, Q6H PRN **OR** acetaminophen (TYLENOL) suppository 650 mg, 650 mg, Rectal, Q6H PRN, Viridiana Smith MD    apixaban (ELIQUIS) tablet 5 mg, 5 mg, Oral, BID, Viridiana Smith MD, 5 mg at 05/26/22 2117    atenolol (TENORMIN) tablet 50 mg, 50 mg, Oral, Daily, Viridiana Smith MD, 50 mg at 05/26/22 0855    lisinopril (PRINIVIL;ZESTRIL) tablet 5 mg, 5 mg, Oral, Daily, Viridiana Smith MD, 5 mg at 05/26/22 0855    pantoprazole (PROTONIX) tablet 40 mg, 40 mg, Oral, QAM AC, Lazaro Murray MD, 40 mg at 05/27/22 0546    atorvastatin (LIPITOR) tablet 20 mg, 20 mg, Oral, Nightly, Lazaro Murray MD, 20 mg at 05/26/22 2117    zolpidem (AMBIEN) tablet 2.5 mg, 2.5 mg, Oral, Nightly PRN, Viridiana Smith MD, 2.5 mg at 05/26/22 2117       =======================================================================================     PHYSICAL EXAM:  Recent vital signs and recent I/Os reviewed by me. Wt Readings from Last 3 Encounters:   05/27/22 132 lb 12.8 oz (60.2 kg)   04/15/22 124 lb 11.2 oz (56.6 kg)   02/15/22 121 lb 4.1 oz (55 kg)     BP Readings from Last 3 Encounters:   05/27/22 118/62   04/22/22 121/77   03/02/22 (!) 144/71     Patient Vitals for the past 24 hrs:   BP Temp Temp src Pulse Resp SpO2 Height Weight   05/27/22 0400 118/62 97.4 °F (36.3 °C) Temporal 70 16 96 % -- 132 lb 12.8 oz (60.2 kg)   05/27/22 0000 109/66 97.2 °F (36.2 °C) Temporal 75 16 97 % -- --   05/26/22 2000 107/63 97.3 °F (36.3 °C) Temporal 73 16 96 % -- --   05/26/22 1606 -- -- -- -- 18 -- -- --   05/26/22 1530 101/78 97 °F (36.1 °C) Temporal 66 18 98 % -- --   05/26/22 1230 (!) 96/53 97 °F (36.1 °C) Temporal -- 18 95 % -- --   05/26/22 1145 (!) 96/53 -- -- 73 -- -- -- --   05/26/22 1046 -- -- -- -- -- -- 5' 10\" (1.778 m) --       Intake/Output Summary (Last 24 hours) at 5/27/2022 0901  Last data filed at 5/27/2022 0547  Gross per 24 hour   Intake 54.11 ml   Output 425 ml   Net -370.89 ml         Physical Exam  Vitals reviewed.    Constitutional: Remains on room air     General: He is not in acute distress. Appearance: Normal appearance. He is ill-appearing. HENT:      Head: Normocephalic and atraumatic. Right Ear: External ear normal.      Left Ear: External ear normal.      Nose: Nose normal.      Mouth/Throat:      Mouth: Mucous membranes are moist. Mucous membranes are not dry. Eyes:      General: No scleral icterus. Conjunctiva/sclera: Conjunctivae normal.   Neck:      Vascular: No JVD. Cardiovascular:      Rate and Rhythm: Normal rate and regular rhythm. Heart sounds: S1 normal and S2 normal.   Pulmonary:      Effort: Pulmonary effort is normal. No respiratory distress. Breath sounds: Rhonchi present. Abdominal:      General: Bowel sounds are normal. There is no distension. Musculoskeletal:         General: No swelling or deformity. Cervical back: Normal range of motion and neck supple. Skin:     General: Skin is dry. Coloration: Skin is not jaundiced. Neurological:      Mental Status: He is alert and oriented to person, place, and time. Mental status is at baseline. Psychiatric:         Mood and Affect: Mood normal.         Behavior: Behavior normal.          =======================================================================================     DATA:  Diagnostic tests reviewed by me for today's visit:   (AS NEEDED FOR MY EVALUATION AND MANAGEMENT).        Recent Labs     05/27/22  0321   WBC 4.9   HCT 34.1*        Iron Saturation:  No components found for: PERCENTFE  FERRITIN:    Lab Results   Component Value Date    FERRITIN 29.2 02/01/2022     IRON:    Lab Results   Component Value Date    IRON 27 02/01/2022     TIBC:    Lab Results   Component Value Date    TIBC 267 02/01/2022       Recent Labs     05/25/22  0542 05/26/22  0320 05/27/22  0321   * 136 137   K 4.2 4.1 4.9    105 107   CO2 21 18* 22   BUN 24* 22* 28*   CREATININE 0.8 0.8 0.9     Recent Labs     05/25/22  0542 05/26/22  0320 05/27/22  0321   CALCIUM 8.6 8.5 8.7   MG  --  1.60*  --    PHOS 2.3* 3.0 2.8     No results for input(s): PH, PCO2, PO2 in the last 72 hours.     Invalid input(s): Aranza Maza    ABG:  No results found for: PH, PCO2, PO2, HCO3, BE, THGB, TCO2, O2SAT  VBG:  No results found for: PHVEN, PGN8NGP, BEVEN, [id number]    LDH:    Lab Results   Component Value Date     02/02/2022     Uric Acid:    Lab Results   Component Value Date    LABURIC 3.6 05/22/2022       PT/INR:    Lab Results   Component Value Date    PROTIME 22.6 05/22/2022    INR 1.95 05/22/2022     Warfarin PT/INR:  No components found for: Gurvinder Granda  PTT:    Lab Results   Component Value Date    APTT 38.4 05/22/2022   [APTT}  Last 3 Troponin:    Lab Results   Component Value Date    TROPONINI 0.02 05/22/2022    TROPONINI 0.03 02/01/2022    TROPONINI <0.01 04/28/2021       U/A:    Lab Results   Component Value Date    COLORU Yellow 05/24/2022    PROTEINU TRACE 05/24/2022    PHUR 6.5 05/24/2022    WBCUA 1 05/24/2022    RBCUA 1 05/24/2022    BACTERIA None Seen 05/24/2022    CLARITYU Clear 05/24/2022    SPECGRAV 1.015 05/24/2022    LEUKOCYTESUR Negative 05/24/2022    UROBILINOGEN 1.0 05/24/2022    BILIRUBINUR Negative 05/24/2022    BLOODU Negative 05/24/2022    GLUCOSEU Negative 05/24/2022     Microalbumen/Creatinine ratio:  No components found for: RUCREAT  24 Hour Urine for Protein:  No components found for: RAWUPRO, UHRS3, [id number], UTV3  24 Hour Urine for Creatinine Clearance:  No components found for: CREAT4, UHRS10, UTV10  Urine Toxicology:  No components found for: IAMMENTA, IBARBIT, IBENZO, ICOCAINE, IMARTHC, IOPIATES, IPHENCYC    HgBA1c:  No results found for: LABA1C  RPR:  No results found for: RPR  HIV:  No results found for: HIV  PORTER:  No results found for: ANATITER, PORTER  RF:  No results found for: RF  DSDNA:  No components found for: DNA  AMYLASE:    Lab Results   Component Value Date    AMYLASE 168 01/17/2018 LIPASE:    Lab Results   Component Value Date    LIPASE 24.0 05/22/2022     Fibrinogen Level:  No components found for: FIB       BELOW MENTIONED RADIOLOGY STUDY RESULTS BY ME (AS NEEDED FOR MY EVALUATION AND MANAGEMENT). XR CHEST PORTABLE    Result Date: 5/22/2022  EXAMINATION: ONE XRAY VIEW OF THE CHEST 5/22/2022 1:16 pm COMPARISON: February 1, 2022 HISTORY: ORDERING SYSTEM PROVIDED HISTORY: SOB TECHNOLOGIST PROVIDED HISTORY: Reason for exam:->SOB Reason for Exam: Fatigue ([facility] from home d/t weakness and frequent falls x 2 days. per EMS, also states some confusion. ) FINDINGS: The cardiomediastinal silhouette is mildly enlarged, stable. There is a right-sided port with distal tip at the cavoatrial junction. Patchy airspace disease involving the parahilar region of the right upper lobe is concerning for pneumonia. Left lung is clear. No pleural effusion or pneumothorax. 1. Suspected right upper lobe pneumonia. Radiographic follow-up recommended to ensure resolution. 2. Mild cardiomegaly, stable. New right-sided port with distal tip at the cavoatrial junction. CT CHEST ABDOMEN PELVIS W CONTRAST    Result Date: 5/19/2022  EXAM: CT CHEST ABDOMEN AND PELVIS INDICATION: Restaging. 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 COMPARISON: CT of the chest, abdomen, and pelvis 2/1/2022 and MRI of the right hip 3/31/2022. TECHNIQUE: Axial CT imaging obtained through the chest, abdomen and pelvis. Axial images and multiplanar reformatted images were reviewed. Up-to-date CT equipment and radiation dose reduction techniques were employed. IV Contrast: 80 mL Isovue-370. Oral Contrast: Yes. CT CHEST: Lungs and Pleura: Left upper lung pulmonary nodule axial image 18 has decreased in size compared to prior exam. Right upper lobe nodule axial image 26 has markedly decreased in size. Bilobed nodule in the anterior left upper lobe axial image 28 appears unchanged.  Dominant nodule in the right lower lobe superior segment has nearly completely resolved. The central nodule along the posterior aspect of the inferior left hilum has significantly decreased in size. 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 Representative nodule which is new and image 58 of series 601 measures 10 mm. Multiple nodules in the left lung base have markedly decreased in size. Representative nodule in the left lower lobe image 87 measures 13 x 15 mm, previously measuring 16 x 15 mm. No pleural effusion. Mediastinum: Markedly improved nodular thickening along the esophagus. A subcarinal lymph node and gastroesophageal lymph nodes are markedly decreased in size. Lymph node on image 58 in the inferior mediastinum measures 16 x 11 mm, previously this measured 33 x 19 mm. Left paratracheal and right paratracheal lymph nodes decreased in size compared to prior exam. Lower Neck and Chest Wall: No axillary lymphadenopathy. No supraclavicular lymphadenopathy. Thyroid gland is unremarkable. Bones: No suspicious osseous lesion identified. CT ABDOMEN AND PELVIS: Upper Abdomen: Numerous hepatic metastasis which appear less distinct on the current exam. Lesion in the right lobe of liver image 95 measures 23 x 20 mm, present measuring 30 x 22 mm. Lesion in the left lobe of liver image 98 measures 26 x 19 mm, previously measuring 29 x 19 mm. Lesion in the inferior right lobe liver image 103 measures 24 x 21 mm, previously measuring 19 x 17 mm. No definite new liver lesion clearly identified. Spleen is unchanged. Pancreas is unremarkable. Mixed response of upper abdominal lymphadenopathy. There is a retrocaval lymph node axial image 117 which has increased in size showing 31 x 15 mm, previously measuring 30 x 9 mm. Aortocaval lymph node axial image 117 measures 20 x 14 mm, previously measuring 23 x 16 mm.  Previous portal caval lymph node is no longer clearly measurable. A left paraceliac lymph node has decreased in size image 112 measuring 10 x 12 mm, previously measuring 20 x 28 mm. Additional retroperitoneal lymph nodes appear similar in size or slightly decreased. No pancreatic abnormality. Retroperitoneum: No hydronephrosis. Stable adrenal glands. Bowel and Peritoneum: Nonobstructive bowel gas pattern. No free air. No significant free fluid. Extensive sigmoid diverticulosis. Extensive vascular calcifications of the aorta. Pelvis: No pelvic lymphadenopathy identified. Bladder is unremarkable. Prostate is mildly enlarged with calcifications. Bones: The lytic lesion in the right acetabulum is difficult to visualize. There is a small incomplete fracture along the roof of the acetabulum suggesting a pathologic fracture. Clinical correlation recommended. No new osseous lesion clearly identified. CHEST: 1. 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 2. Numerous previously identified nodules in the lungs bilaterally have decreased in size in the interval. Some of the nodules are no longer clearly visualized. The mediastinal lymphadenopathy has markedly decreased in the interval compatible with response to therapy. No progressive lymphadenopathy identified. Overall mixed response with new nodularity in the superior segment left lower lobe. 3. Marked improvement in nodular thickening of the mid and lower esophagus compared to previous exam. ABDOMEN/PELVIS: 1. New incomplete pathologic fracture along the roof of the right acetabulum. Correlate clinically. 2. Extensive hepatic metastatic disease with many of the nodules measuring stable or slightly decreased in size. A few nodules measure slightly larger.  3. Next response of upper abdominal lymphadenopathy with a few lymph nodes measuring slightly larger and others measuring decreased in size in comparison to prior exam. 4. Extensive diverticulosis without diverticulitis. 5. No new osseous lesion identified.

## 2022-05-27 NOTE — PROGRESS NOTES
Hospitalist Progress Note      PCP: Praneeth Brewster MD    Date of Admission: 5/22/2022    Hospital Course: This 78-year-old male with PMHx of stage IV metastatic distal esophageal carcinoma, DVT on Eliquis presented with generalized weakness. According to the patient he has been feeling weak for 3 to 4 days, he also has generalized weakness and has been having difficulty ambulation. Patient was also found positive for COVID-19. \"     Interval history  Pt seen and examined today. Overnight events noted, interval ancillary notes and labs reviewed. Reported that his diarrhea is improved. Denied cough, SOB, chest pain, nausea, vomiting or abdominal pain  Wife appealed against discharge. Appeal was denied  Pre-CERT started for SNF      Assessment/Plan:    Active Hospital Problems    Diagnosis     Sepsis ([facility] [address] [A41.9]      Priority: Medium     Assessment    Right upper lobe pneumonia: Likely secondary to COVID-19/superimposed bacterial pneumonia? Febrile with elevated procalcitonin on admission. 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 Last treatment of trastuzumab and pembrolizumab only was given on 3/29/2022. 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 New incomplete pathological fracture along the roof of right acetabulum  Oncology consulted:  Follow-up with Dr. Tang Rodriguez as outpatient to discuss further treatment plan    Hx of DVT: On Eliquis    Hyponatremia: Likely secondary from hypovolemia  Nephrology on board: Appreciate their recs  Monitor sodium levels closely    Lactic acidosis likely secondary to dehydration: Resolved    Atrial fibrillation: Continue Eliquis and beta-blocker. Monitor telemetry    Elevated troponin likely demand ischemia: Remained flat    QTC prolongation: Avoid QTC prolonging drugs    Generalized weakness: PT OT consulted       DVT Prophylaxis: Eliquis  Diet: ADULT DIET; Regular  ADULT ORAL NUTRITION SUPPLEMENT; Dinner, Breakfast; Standard High Calorie/High Protein Oral Supplement  Code Status: Full Code    PT/OT Eval Status: As needed    Dispo -Home in 1 to 2 days            Medications:  Reviewed    Infusion Medications    sodium chloride       Scheduled Medications    brimonidine  1 drop Right Eye TID    doxycycline hyclate  100 mg Oral 2 times per day    ferrous sulfate  325 mg Oral Daily with breakfast    cetirizine  5 mg Oral Daily    dorzolamide  1 drop Right Eye TID    cefTRIAXone (ROCEPHIN) IV  1,000 mg IntraVENous Q24H    sodium chloride flush  10 mL IntraVENous 2 times per day    apixaban  5 mg Oral BID    atenolol  50 mg Oral Daily    lisinopril  5 mg Oral Daily    pantoprazole  40 mg Oral QAM AC    atorvastatin  20 mg Oral Nightly     PRN Meds: loperamide, oxyCODONE-acetaminophen, sodium chloride flush, sodium chloride, potassium chloride **OR** potassium alternative oral replacement **OR** potassium chloride, potassium chloride, magnesium sulfate, promethazine **OR** [DISCONTINUED] ondansetron, magnesium hydroxide, acetaminophen **OR** acetaminophen, zolpidem      Intake/Output Summary (Last 24 hours) at 5/27/2022 0857  Last data filed at 5/27/2022 0547  Gross per 24 hour   Intake 54.11 ml   Output 425 ml   Net -370.89 ml       Physical Exam Performed:    /62   Pulse 70   Temp 97.4 °F (36.3 °C) (Temporal)   Resp 16   Ht 5' 10\" (1.778 m)   Wt 132 lb 12.8 oz (60.2 kg)   SpO2 96%   BMI 19.05 kg/m²     General appearance: No apparent distress, appears stated age and cooperative. HEENT:MMM  Neck: Supple, with full range of motion.   Trachea midline. Respiratory:  Normal respiratory effort. Diminished at bases  Cardiovascular: Regular rate and rhythm with normal S1/S2 without murmurs,+Port-a-cath. Abdomen: Soft, non-tender, non-distended with normal bowel sounds. Musculoskeletal: No clubbing, cyanosis or edema bilaterally. Skin: Skin color, texture, turgor normal.  No rashes or lesions. Neurologic:  Neurovascularly intact without any focal sensory/motor deficits. grossly non-focal.  Psychiatric: Alert and oriented, thought content appropriate, normal insight      Labs:   Recent Labs     05/27/22  0321   WBC 4.9   HGB 11.1*   HCT 34.1*        Recent Labs     05/25/22  0542 05/26/22  0320 05/27/22  0321   * 136 137   K 4.2 4.1 4.9    105 107   CO2 21 18* 22   BUN 24* 22* 28*   CREATININE 0.8 0.8 0.9   CALCIUM 8.6 8.5 8.7   PHOS 2.3* 3.0 2.8     No results for input(s): AST, ALT, BILIDIR, BILITOT, ALKPHOS in the last 72 hours. No results for input(s): INR in the last 72 hours. No results for input(s): Donah Keens in the last 72 hours. Urinalysis:      Lab Results   Component Value Date    NITRU Negative 05/24/2022    WBCUA 1 05/24/2022    BACTERIA None Seen 05/24/2022    RBCUA 1 05/24/2022    BLOODU Negative 05/24/2022    SPECGRAV 1.015 05/24/2022    GLUCOSEU Negative 05/24/2022       Radiology:  XR CHEST PORTABLE   Final Result   1. Suspected right upper lobe pneumonia. Radiographic follow-up recommended   to ensure resolution. 2. Mild cardiomegaly, stable. New right-sided port with distal tip at the   cavoatrial junction.                  Helen Park MD

## 2022-05-27 NOTE — PROGRESS NOTES
Assessment completed. VSS. Patient awake, alert, and in bed. Medications given per MAR. Safety and isolation precautions maintained. Call light within reach. Will continue to monitor.

## 2022-05-27 NOTE — PLAN OF CARE
Problem: Discharge Planning  Goal: Discharge to home or other facility with appropriate resources  5/27/2022 1132 by Araceli Tillman RN  Outcome: Progressing  Problem: Pain  Goal: Verbalizes/displays adequate comfort level or baseline comfort level  5/27/2022 1132 by Araceli Tillman RN  Outcome: Progressing       Problem: Safety - Adult  Goal: Free from fall injury  5/27/2022 1132 by Araceli Tillman RN  Outcome: Progressing     Problem: ABCDS Injury Assessment  Goal: Absence of physical injury  5/27/2022 1132 by Araceli Tillman RN  Outcome: Progressing  Problem: Skin/Tissue Integrity  Goal: Absence of new skin breakdown  Description: 1. Monitor for areas of redness and/or skin breakdown  2. Assess vascular access sites hourly  3. Every 4-6 hours minimum:  Change oxygen saturation probe site  4. Every 4-6 hours:  If on nasal continuous positive airway pressure, respiratory therapy assess nares and determine need for appliance change or resting period.   5/27/2022 1132 by Araceli Tillman RN  Outcome: Progressing     Problem: Nutrition Deficit:  Goal: Optimize nutritional status  5/27/2022 1132 by Araceli Tillman RN  Outcome: Progressing

## 2022-05-27 NOTE — CARE COORDINATION
Discharge Planning  The SW has been notified that the  [address] appeal has been denied and the patient becomes finically responsible for their hospital stay starting  Tomorrow 5/28/2022 at 12pm. The patient and his wife have been notified of the following information. The SW informed the patient and his wife that pre-cert is still pending for [address] and their has not been a decision as of yet. The SW informed the patient and the patient's spouse if pre-cert is still pending tomorrow he would still have to discharge home by 12pm tomorrow or he will become finically responsible for his hospital stay. The patient continued to express concern with going home stating his wife will be unable to assist him at home with his needs. The SW informed the patient he is currently active with [place] [id number] through [facility] and they could provide [place] [phone] services. Also the SW discussed with the patient regarding the option of paying privately for Aide services at home for additional support at home. The SW also dicussed services such as [employer] and [employer] that could assist when the patient gets home with possible LTC placement if needed.     Electronically signed by EDUIN Coppola on 5/27/2022 at 2:27 PM

## 2022-05-28 VITALS
WEIGHT: 134.2 LBS | BODY MASS INDEX: 19.21 KG/M2 | RESPIRATION RATE: 18 BRPM | HEIGHT: 70 IN | SYSTOLIC BLOOD PRESSURE: 107 MMHG | TEMPERATURE: 97.1 F | OXYGEN SATURATION: 97 % | DIASTOLIC BLOOD PRESSURE: 90 MMHG | HEART RATE: 81 BPM

## 2022-05-28 LAB
ALBUMIN SERPL-MCNC: 2.6 G/DL (ref 3.4–5)
ANION GAP SERPL CALCULATED.3IONS-SCNC: 9 MMOL/L (ref 3–16)
BUN BLDV-MCNC: 33 MG/DL (ref 7–20)
CALCIUM SERPL-MCNC: 8.6 MG/DL (ref 8.3–10.6)
CHLORIDE BLD-SCNC: 106 MMOL/L (ref 99–110)
CO2: 21 MMOL/L (ref 21–32)
CREAT SERPL-MCNC: 0.9 MG/DL (ref 0.8–1.3)
GFR AFRICAN AMERICAN: >60
GFR NON-AFRICAN AMERICAN: >60
GLUCOSE BLD-MCNC: 102 MG/DL (ref 70–99)
PHOSPHORUS: 2.5 MG/DL (ref 2.5–4.9)
POTASSIUM SERPL-SCNC: 4.6 MMOL/L (ref 3.5–5.1)
SODIUM BLD-SCNC: 136 MMOL/L (ref 136–145)

## 2022-05-28 PROCEDURE — 2580000003 HC RX 258: Performed by: INTERNAL MEDICINE

## 2022-05-28 PROCEDURE — 6370000000 HC RX 637 (ALT 250 FOR IP): Performed by: INTERNAL MEDICINE

## 2022-05-28 PROCEDURE — 80069 RENAL FUNCTION PANEL: CPT

## 2022-05-28 PROCEDURE — 36415 COLL VENOUS BLD VENIPUNCTURE: CPT

## 2022-05-28 RX ORDER — DOXYCYCLINE HYCLATE 100 MG
100 TABLET ORAL EVERY 12 HOURS SCHEDULED
Qty: 4 TABLET | Refills: 0
Start: 2022-05-28 | End: 2022-05-28

## 2022-05-28 RX ORDER — LOPERAMIDE HYDROCHLORIDE 2 MG/1
2 CAPSULE ORAL 4 TIMES DAILY PRN
Qty: 12 CAPSULE | Refills: 0
Start: 2022-05-28 | End: 2022-05-31

## 2022-05-28 RX ORDER — OXYCODONE AND ACETAMINOPHEN 10; 325 MG/1; MG/1
1 TABLET ORAL EVERY 8 HOURS PRN
Qty: 9 TABLET | Refills: 0 | Status: SHIPPED | OUTPATIENT
Start: 2022-05-28 | End: 2022-05-31

## 2022-05-28 RX ORDER — DOXYCYCLINE HYCLATE 100 MG
100 TABLET ORAL EVERY 12 HOURS SCHEDULED
Qty: 1 TABLET | Refills: 0 | Status: SHIPPED | OUTPATIENT
Start: 2022-05-28 | End: 2022-05-29

## 2022-05-28 RX ORDER — DOXYCYCLINE HYCLATE 100 MG
100 TABLET ORAL EVERY 12 HOURS SCHEDULED
Qty: 1 TABLET | Refills: 0
Start: 2022-05-28 | End: 2022-05-28

## 2022-05-28 RX ADMIN — FERROUS SULFATE TAB 325 MG (65 MG ELEMENTAL FE) 325 MG: 325 (65 FE) TAB at 09:43

## 2022-05-28 RX ADMIN — ATENOLOL 50 MG: 50 TABLET ORAL at 09:43

## 2022-05-28 RX ADMIN — BRIMONIDINE TARTRATE 1 DROP: 2 SOLUTION OPHTHALMIC at 09:42

## 2022-05-28 RX ADMIN — APIXABAN 5 MG: 5 TABLET, FILM COATED ORAL at 09:43

## 2022-05-28 RX ADMIN — CETIRIZINE HYDROCHLORIDE 5 MG: 10 TABLET, FILM COATED ORAL at 09:43

## 2022-05-28 RX ADMIN — PANTOPRAZOLE SODIUM 40 MG: 40 TABLET, DELAYED RELEASE ORAL at 06:22

## 2022-05-28 RX ADMIN — DORZOLAMIDE HYDROCHLORIDE 1 DROP: 20 SOLUTION/ DROPS OPHTHALMIC at 09:44

## 2022-05-28 RX ADMIN — Medication 10 ML: at 09:45

## 2022-05-28 RX ADMIN — DOXYCYCLINE HYCLATE 100 MG: 100 TABLET, COATED ORAL at 09:43

## 2022-05-28 ASSESSMENT — PAIN SCALES - GENERAL
PAINLEVEL_OUTOF10: 0

## 2022-05-28 ASSESSMENT — PAIN SCALES - WONG BAKER
WONGBAKER_NUMERICALRESPONSE: 0

## 2022-05-28 NOTE — PROGRESS NOTES
MD Haleigh Finley MD Rueben Bowling, MD               Office: [phone]                      Fax: [phone]             [address] [place] [address]                   NEPHROLOGY INPATIENT PROGRESS NOTE:     PATIENT NAME: Lilly Bond  : 1940  MRN: [id number]         Treatment plan update    Hyponatremia-improved to baseline-repeat sodium is 136. Acute kidney injury-improving-repeat creatinine 0.9. Rest of the electrolytes are stable. Volume status improving. Medications reviewed. Stable from renal for discharge                   Admitted for:  Sepsis ([facility] [A41.9]  Pneumonia of right upper lobe due to infectious organism [J18.9]  COVID-19 [U07.1]        - BL Na -low 130s   -: Etiology - likely hypovolemia + underlying mild SIADH type effect w/ PNA , COVID-19 w/ hypotension    Associated problems:   - Volume status: hypo-volemic  : BP: hypotension  - renal fx WNL  - Electrolytes: hypokalemia   Hypophosphatemia     - Acid-Base: acidosis - high AGMA w/ lactic acidosis   - Anemia: of chronic disease - mild        Other major problems: Management per primary and other consulting teams.   //   Hospital Problems           Last Modified POA    * (Principal) Sepsis ([facility] 2022 Yes             Please refer to the orders. High Complexity. Multiple complex problems. Discussed with patient and treatment team-    Time spent > 30 (~35) minutes. Thank you for allowing me to participate in this patient's care. Please do not hesitate to contact me anytime. We will follow along with you.        Geronimo Jackson MD,  [facility] [address] [place] [address]: [phone] or [address]  Fax: [phone]        =======================================================================================   =======================================================================================  SUBJECTIVE  Seen resting in bed   No active complaints   Remains on room air, low BP is better  No distress  Labs noted     Past medical, Surgical, Social, Family medical history reviewed by me. MEDICATIONS: reviewed by me. Medications Prior to Admission:  No current facility-administered medications on file prior to encounter. Current Outpatient Medications on File Prior to Encounter   Medication Sig Dispense Refill    oxyCODONE-acetaminophen (PERCOCET)  MG per tablet Take 1 tablet by mouth every 8 hours as needed for Pain.       apixaban (ELIQUIS) 5 MG TABS tablet Take 1 tablet by mouth 2 times daily 60 tablet 1    ferrous sulfate (IRON 325) 325 (65 Fe) MG tablet Take 325 mg by mouth daily (with breakfast)       Cholecalciferol (VITAMIN D3) 50 MCG (2000 UT) CAPS Take by mouth      Cholecalciferol (VITAMIN D3) 125 MCG (5000 UT) TABS Take by mouth      pantoprazole (PROTONIX) 40 MG tablet Take 1 tablet by mouth daily 30 tablet 0    loratadine (CLARITIN) 10 MG tablet Take 10 mg by mouth as needed       ondansetron (ZOFRAN) 4 MG tablet Take 1 tablet by mouth every 4 hours as needed for Nausea or Vomiting 15 tablet 0    atenolol (TENORMIN) 25 MG tablet Take 50 mg by mouth daily       ALPHAGAN P 0.1 % SOLN Place 1 drop into the left eye 3 times daily       dorzolamide (TRUSOPT) 2 % ophthalmic solution Place 1 drop into the right eye 3 times daily       lisinopril (PRINIVIL;ZESTRIL) 5 MG tablet Take 5 mg by mouth daily       simvastatin (ZOCOR) 40 MG tablet Take 40 mg by mouth nightly       zolpidem (AMBIEN) 5 MG tablet 2.5 mg.            Current Facility-Administered Medications:     loperamide (IMODIUM) capsule 2 mg, 2 mg, Oral, 4x Daily PRN, Des Mayorga MD, 2 mg at 05/27/22 2115    brimonidine (ALPHAGAN) 0.2 % ophthalmic solution 1 drop, 1 drop, Right Eye, TID, Des Mayorga MD, 1 drop at 05/28/22 0942    doxycycline hyclate (VIBRA-TABS) tablet 100 mg, 100 mg, Oral, 2 times per day, Shelton Glover MD, 100 mg at 05/28/22 0918    ferrous sulfate (IRON 325) tablet 325 mg, 325 mg, Oral, Daily with breakfast, Randy Araujo MD, 325 mg at 05/28/22 0943    cetirizine (ZYRTEC) tablet 5 mg, 5 mg, Oral, Daily, Randy Araujo MD, 5 mg at 05/28/22 0943    oxyCODONE-acetaminophen (PERCOCET)  MG per tablet 1 tablet, 1 tablet, Oral, Q8H PRN, Randy Araujo MD, 1 tablet at 05/26/22 1536    dorzolamide (TRUSOPT) 2 % ophthalmic solution 1 drop, 1 drop, Right Eye, TID, Sophie Mercado MD, 1 drop at 05/28/22 0944    cefTRIAXone (ROCEPHIN) 1000 mg in sterile water 10 mL IV syringe, 1,000 mg, IntraVENous, Q24H, 2135 Alicia Cox, PA-C, 1,000 mg at 05/27/22 1521    sodium chloride flush 0.9 % injection 10 mL, 10 mL, IntraVENous, 2 times per day, Tatyana Samaniego MD, 10 mL at 05/28/22 0945    sodium chloride flush 0.9 % injection 10 mL, 10 mL, IntraVENous, PRN, Tatyana Samaniego MD    0.9 % sodium chloride infusion, , IntraVENous, PRN, Tatyana Samaniego MD    potassium chloride (KLOR-CON M) extended release tablet 40 mEq, 40 mEq, Oral, PRN, 40 mEq at 05/24/22 1259 **OR** potassium bicarb-citric acid (EFFER-K) effervescent tablet 40 mEq, 40 mEq, Oral, PRN **OR** potassium chloride 10 mEq/100 mL IVPB (Peripheral Line), 10 mEq, IntraVENous, PRN, Tatyana Samaniego MD    potassium chloride 10 mEq/100 mL IVPB (Peripheral Line), 10 mEq, IntraVENous, PRN, Tatyana Samaniego MD    magnesium sulfate 2000 mg in 50 mL IVPB premix, 2,000 mg, IntraVENous, PRN, Tatyana Samaniego MD, Stopped at 05/26/22 1046    promethazine (PHENERGAN) tablet 12.5 mg, 12.5 mg, Oral, Q6H PRN **OR** [DISCONTINUED] ondansetron (ZOFRAN) injection 4 mg, 4 mg, IntraVENous, Q6H PRN, Tatyana Samaniego MD    magnesium hydroxide (MILK OF MAGNESIA) 400 MG/5ML suspension 30 mL, 30 mL, Oral, Daily PRN, Tatyana Samaniego MD    acetaminophen (TYLENOL) tablet 650 mg, 650 mg, Oral, Q6H PRN **OR** acetaminophen (TYLENOL) suppository 650 mg, 650 mg, Rectal, Q6H PRN, Tatyana Samaniego MD    apixaban (ELIQUIS) tablet 5 mg, 5 mg, Oral, BID, Brandon Murillo MD, 5 mg at 05/28/22 0943    atenolol (TENORMIN) tablet 50 mg, 50 mg, Oral, Daily, Brandon Murillo MD, 50 mg at 05/28/22 0943    lisinopril (PRINIVIL;ZESTRIL) tablet 5 mg, 5 mg, Oral, Daily, Brandon Murillo MD, 5 mg at 05/26/22 0855    pantoprazole (PROTONIX) tablet 40 mg, 40 mg, Oral, QAM AC, Lazaro Murray MD, 40 mg at 05/28/22 0622    atorvastatin (LIPITOR) tablet 20 mg, 20 mg, Oral, Nightly, Lazaro Murray MD, 20 mg at 05/27/22 2115    zolpidem (AMBIEN) tablet 2.5 mg, 2.5 mg, Oral, Nightly PRN, Brandon Murillo MD, 2.5 mg at 05/27/22 2115       =======================================================================================     PHYSICAL EXAM:  Recent vital signs and recent I/Os reviewed by me. Wt Readings from Last 3 Encounters:   05/28/22 134 lb 3.2 oz (60.9 kg)   04/15/22 124 lb 11.2 oz (56.6 kg)   02/15/22 121 lb 4.1 oz (55 kg)     BP Readings from Last 3 Encounters:   05/28/22 (!) 107/90   04/22/22 121/77   03/02/22 (!) 144/71     Patient Vitals for the past 24 hrs:   BP Temp Temp src Pulse Resp SpO2 Weight   05/28/22 0930 (!) 107/90 97.1 °F (36.2 °C) Temporal -- 18 97 % --   05/28/22 0423 -- -- -- -- -- -- 134 lb 3.2 oz (60.9 kg)   05/28/22 0400 (!) 149/74 97.5 °F (36.4 °C) Temporal 81 18 95 % --   05/28/22 0000 98/62 97.3 °F (36.3 °C) Temporal 72 16 96 % --   05/27/22 2000 104/62 97.4 °F (36.3 °C) Temporal 81 18 95 % --   05/27/22 1600 -- 97.3 °F (36.3 °C) Temporal -- 16 96 % --   05/27/22 1400 94/63 -- -- 73 -- -- --   05/27/22 1200 98/66 97.2 °F (36.2 °C) Temporal 76 -- 95 % --       Intake/Output Summary (Last 24 hours) at 5/28/2022 1100  Last data filed at 5/28/2022 9569  Gross per 24 hour   Intake 250 ml   Output 1050 ml   Net -800 ml         Physical Exam  Vitals reviewed. Constitutional:    Remains on room air     General: He is not in acute distress. Appearance: Normal appearance. He is ill-appearing. HENT:      Head: Normocephalic and atraumatic.       Right Ear: External ear normal.      Left Ear: External ear normal.      Nose: Nose normal.      Mouth/Throat:      Mouth: Mucous membranes are moist. Mucous membranes are not dry. Eyes:      General: No scleral icterus. Conjunctiva/sclera: Conjunctivae normal.   Neck:      Vascular: No JVD. Cardiovascular:      Rate and Rhythm: Normal rate and regular rhythm. Heart sounds: S1 normal and S2 normal.   Pulmonary:      Effort: Pulmonary effort is normal. No respiratory distress. Breath sounds: Rhonchi present. Abdominal:      General: Bowel sounds are normal. There is no distension. Musculoskeletal:         General: No swelling or deformity. Cervical back: Normal range of motion and neck supple. Skin:     General: Skin is dry. Coloration: Skin is not jaundiced. Neurological:      Mental Status: He is alert and oriented to person, place, and time. Mental status is at baseline. Psychiatric:         Mood and Affect: Mood normal.         Behavior: Behavior normal.          =======================================================================================     DATA:  Diagnostic tests reviewed by me for today's visit:   (AS NEEDED FOR MY EVALUATION AND MANAGEMENT). Recent Labs     05/27/22 0321   WBC 4.9   HCT 34.1*        Iron Saturation:  No components found for: PERCENTFE  FERRITIN:    Lab Results   Component Value Date    FERRITIN 29.2 02/01/2022     IRON:    Lab Results   Component Value Date    IRON 27 02/01/2022     TIBC:    Lab Results   Component Value Date    TIBC 267 02/01/2022       Recent Labs     05/26/22  0320 05/27/22  0321 05/28/22  0256    137 136   K 4.1 4.9 4.6    107 106   CO2 18* 22 21   BUN 22* 28* 33*   CREATININE 0.8 0.9 0.9     Recent Labs     05/26/22 0320 05/27/22 0321 05/28/22  0256   CALCIUM 8.5 8.7 8.6   MG 1.60*  --   --    PHOS 3.0 2.8 2.5     No results for input(s): PH, PCO2, PO2 in the last 72 hours.     Invalid input(s): [id number], INSPIREDO2    ABG:  No results found for: PH, PCO2, PO2, HCO3, BE, THGB, TCO2, O2SAT  VBG:  No results found for: PHVEN, KGI0VPQ, BEVEN, T9MRSVIX    LDH:    Lab Results   Component Value Date     02/02/2022     Uric Acid:    Lab Results   Component Value Date    LABURIC 3.6 05/22/2022       PT/INR:    Lab Results   Component Value Date    PROTIME 22.6 05/22/2022    INR 1.95 05/22/2022     Warfarin PT/INR:  No components found for: Venu Dewey  PTT:    Lab Results   Component Value Date    APTT 38.4 05/22/2022   [APTT}  Last 3 Troponin:    Lab Results   Component Value Date    TROPONINI 0.02 05/22/2022    TROPONINI 0.03 02/01/2022    TROPONINI <0.01 04/28/2021       U/A:    Lab Results   Component Value Date    COLORU Yellow 05/24/2022    PROTEINU TRACE 05/24/2022    PHUR 6.5 05/24/2022    WBCUA 1 05/24/2022    RBCUA 1 05/24/2022    BACTERIA None Seen 05/24/2022    CLARITYU Clear 05/24/2022    SPECGRAV 1.015 05/24/2022    LEUKOCYTESUR Negative 05/24/2022    UROBILINOGEN 1.0 05/24/2022    BILIRUBINUR Negative 05/24/2022    BLOODU Negative 05/24/2022    GLUCOSEU Negative 05/24/2022     Microalbumen/Creatinine ratio:  No components found for: RUCREAT  24 Hour Urine for Protein:  No components found for: RAWUPRO, UHRS3, BXLK84ZJ, UTV3  24 Hour Urine for Creatinine Clearance:  No components found for: CREAT4, UHRS10, UTV10  Urine Toxicology:  No components found for: IAMMENTA, IBARBIT, IBENZO, ICOCAINE, IMARTHC, IOPIATES, IPHENCYC    HgBA1c:  No results found for: LABA1C  RPR:  No results found for: RPR  HIV:  No results found for: HIV  PORTER:  No results found for: ANATITER, PORTER  RF:  No results found for: RF  DSDNA:  No components found for: DNA  AMYLASE:    Lab Results   Component Value Date    AMYLASE 168 01/17/2018     LIPASE:    Lab Results   Component Value Date    LIPASE 24.0 05/22/2022     Fibrinogen Level:  No components found for: FIB       BELOW MENTIONED RADIOLOGY STUDY RESULTS BY ME (AS NEEDED FOR MY EVALUATION AND MANAGEMENT). XR CHEST PORTABLE    Result Date: 5/22/2022  EXAMINATION: ONE XRAY VIEW OF THE CHEST 5/22/2022 1:16 pm COMPARISON: February 1, 2022 HISTORY: ORDERING SYSTEM PROVIDED HISTORY: SOB TECHNOLOGIST PROVIDED HISTORY: Reason for exam:->SOB Reason for Exam: Fatigue ([facility] from home d/t weakness and frequent falls x 2 days. per EMS, also states some confusion. ) FINDINGS: The cardiomediastinal silhouette is mildly enlarged, stable. There is a right-sided port with distal tip at the cavoatrial junction. Patchy airspace disease involving the parahilar region of the right upper lobe is concerning for pneumonia. Left lung is clear. No pleural effusion or pneumothorax. 1. Suspected right upper lobe pneumonia. Radiographic follow-up recommended to ensure resolution. 2. Mild cardiomegaly, stable. New right-sided port with distal tip at the cavoatrial junction. CT CHEST ABDOMEN PELVIS W CONTRAST    Result Date: 5/19/2022  EXAM: CT CHEST ABDOMEN AND PELVIS INDICATION: Restaging. 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 COMPARISON: CT of the chest, abdomen, and pelvis 2/1/2022 and MRI of the right hip 3/31/2022. TECHNIQUE: Axial CT imaging obtained through the chest, abdomen and pelvis. Axial images and multiplanar reformatted images were reviewed. Up-to-date CT equipment and radiation dose reduction techniques were employed. IV Contrast: 80 mL Isovue-370. Oral Contrast: Yes. CT CHEST: Lungs and Pleura: Left upper lung pulmonary nodule axial image 18 has decreased in size compared to prior exam. Right upper lobe nodule axial image 26 has markedly decreased in size. Bilobed nodule in the anterior left upper lobe axial image 28 appears unchanged. Dominant nodule in the right lower lobe superior segment has nearly completely resolved. The central nodule along the posterior aspect of the inferior left hilum has significantly decreased in size.  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 Representative nodule which is new and image 58 of series 601 measures 10 mm. Multiple nodules in the left lung base have markedly decreased in size. Representative nodule in the left lower lobe image 87 measures 13 x 15 mm, previously measuring 16 x 15 mm. No pleural effusion. Mediastinum: Markedly improved nodular thickening along the esophagus. A subcarinal lymph node and gastroesophageal lymph nodes are markedly decreased in size. Lymph node on image 58 in the inferior mediastinum measures 16 x 11 mm, previously this measured 33 x 19 mm. Left paratracheal and right paratracheal lymph nodes decreased in size compared to prior exam. Lower Neck and Chest Wall: No axillary lymphadenopathy. No supraclavicular lymphadenopathy. Thyroid gland is unremarkable. Bones: No suspicious osseous lesion identified. CT ABDOMEN AND PELVIS: Upper Abdomen: Numerous hepatic metastasis which appear less distinct on the current exam. Lesion in the right lobe of liver image 95 measures 23 x 20 mm, present measuring 30 x 22 mm. Lesion in the left lobe of liver image 98 measures 26 x 19 mm, previously measuring 29 x 19 mm. Lesion in the inferior right lobe liver image 103 measures 24 x 21 mm, previously measuring 19 x 17 mm. No definite new liver lesion clearly identified. Spleen is unchanged. Pancreas is unremarkable. Mixed response of upper abdominal lymphadenopathy. There is a retrocaval lymph node axial image 117 which has increased in size showing 31 x 15 mm, previously measuring 30 x 9 mm. Aortocaval lymph node axial image 117 measures 20 x 14 mm, previously measuring 23 x 16 mm. Previous portal caval lymph node is no longer clearly measurable. A left paraceliac lymph node has decreased in size image 112 measuring 10 x 12 mm, previously measuring 20 x 28 mm.  Additional retroperitoneal lymph nodes appear similar in size or slightly decreased. No pancreatic abnormality. Retroperitoneum: No hydronephrosis. Stable adrenal glands. Bowel and Peritoneum: Nonobstructive bowel gas pattern. No free air. No significant free fluid. Extensive sigmoid diverticulosis. Extensive vascular calcifications of the aorta. Pelvis: No pelvic lymphadenopathy identified. Bladder is unremarkable. Prostate is mildly enlarged with calcifications. Bones: The lytic lesion in the right acetabulum is difficult to visualize. There is a small incomplete fracture along the roof of the acetabulum suggesting a pathologic fracture. Clinical correlation recommended. No new osseous lesion clearly identified. CHEST: 1. 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 2. Numerous previously identified nodules in the lungs bilaterally have decreased in size in the interval. Some of the nodules are no longer clearly visualized. The mediastinal lymphadenopathy has markedly decreased in the interval compatible with response to therapy. No progressive lymphadenopathy identified. Overall mixed response with new nodularity in the superior segment left lower lobe. 3. Marked improvement in nodular thickening of the mid and lower esophagus compared to previous exam. ABDOMEN/PELVIS: 1. New incomplete pathologic fracture along the roof of the right acetabulum. Correlate clinically. 2. Extensive hepatic metastatic disease with many of the nodules measuring stable or slightly decreased in size. A few nodules measure slightly larger. 3. Next response of upper abdominal lymphadenopathy with a few lymph nodes measuring slightly larger and others measuring decreased in size in comparison to prior exam. 4. Extensive diverticulosis without diverticulitis. 5. No new osseous lesion identified.

## 2022-05-28 NOTE — PROGRESS NOTES
Pt with a d/c order. Report called to [facility], transport here to transport pt to the facility, will continue to monitor.

## 2022-05-28 NOTE — CARE COORDINATION
Discharge Planning. Discharge Plan:     Patient discharged to:  [address]  Phone: [phone]  Fax: [phone]  SW/DC Planner faxed, [address]:[phone]  Narcotic Prescriptions faxed were:n/a  RN: (nurse's name) will call report to:    [phone]    Medical Transport with:[facility]  ([phone])   time: 11:45am  Family advised of discharge?:yes  HENS Submitted?:  yes  All discharge needs met per case management.     Electronically signed by EDUIN Coelho on 5/28/2022 at 9:40 AM

## 2022-05-28 NOTE — PLAN OF CARE
Problem: Discharge Planning  Goal: Discharge to home or other facility with appropriate resources  5/27/2022 2208 by Vonda Del Toro RN  Outcome: Progressing  5/27/2022 1132 by Roby Miller RN  Outcome: Progressing     Problem: Pain  Goal: Verbalizes/displays adequate comfort level or baseline comfort level  5/27/2022 2208 by Vonda Del Toro RN  Outcome: Progressing  5/27/2022 1132 by Roby Miller RN  Outcome: Progressing     Problem: Safety - Adult  Goal: Free from fall injury  5/27/2022 2208 by Vonda Del Toro RN  Outcome: Progressing  [address] (Taken 5/27/2022 2205)  Free From Fall Injury: Instruct family/caregiver on patient safety  5/27/2022 1132 by Roby Miller RN  Outcome: Progressing     Problem: ABCDS Injury Assessment  Goal: Absence of physical injury  5/27/2022 2208 by Vonda Del Toro RN  Outcome: Progressing  5/27/2022 1132 by Roby Miller RN  Outcome: Progressing     Problem: Skin/Tissue Integrity  Goal: Absence of new skin breakdown  Description: 1. Monitor for areas of redness and/or skin breakdown  2. Assess vascular access sites hourly  3. Every 4-6 hours minimum:  Change oxygen saturation probe site  4. Every 4-6 hours:  If on nasal continuous positive airway pressure, respiratory therapy assess nares and determine need for appliance change or resting period.   5/27/2022 2208 by Vonda Del Toro RN  Outcome: Progressing  5/27/2022 1132 by Roby Miller RN  Outcome: Progressing     Problem: Nutrition Deficit:  Goal: Optimize nutritional status  5/27/2022 2208 by Vonda Del Toro RN  Outcome: Progressing  5/27/2022 1132 by Roby Miller RN  Outcome: Progressing

## 2022-05-28 NOTE — DISCHARGE SUMMARY
Hospital Medicine Discharge Summary    Patient ID: [facility] Body      Patient's PCP: Jacinto Bhatt MD    Admit Date: 5/22/2022     Discharge Date: 5/28/2022     Admitting Physician: Shalini Hylton MD     Discharge Physician: Melinda Marquez MD        Active Hospital Problems    Diagnosis     Sepsis [facility]) [A41.9]      Priority: Medium         Hospital Course: This 22-year-old male with PMHx of stage IV metastatic distal esophageal carcinoma, DVT on Eliquis presented with generalized weakness      Right upper lobe pneumonia: Likely 2/2 COVID-19/superimposed bacterial pneumonia? Febrile with elevated procalcitonin on admission. 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 Last treatment of trastuzumab and pembrolizumab only was given on 3/29/2022. 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 New incomplete pathological fracture along the roof of right acetabulum  Oncology consulted: Follow-up with Dr. Marlen Phipps as outpatient to discuss further treatment plan     Hx of DVT: Continue Eliquis     Hyponatremia: Likely secondary from hypovolemia: Resolved    Lactic acidosis likely secondary to dehydration: Resolved     Atrial fibrillation: Continue Eliquis and beta-blocker.      Elevated troponin likely demand ischemia: Remained flat     QTC prolongation: Avoid QTC prolonging drugs       Physical Exam Performed:     BP (!) 107/90   Pulse 81   Temp 97.1 °F (36.2 °C) (Temporal)   Resp 18   Ht 5' 10\" (1.778 m)   Wt 134 lb 3.2 oz (60.9 kg)   SpO2 97%   BMI 19.26 kg/m²     General appearance:  No apparent distress, appears stated age and cooperative. Eyes: Sclera clear.  Pupils equal.  ENT: Moist oral mucosa. Trachea midline, no adenopathy. Cardiovascular: Regular rhythm, normal S1, S2. No murmur. No edema in lower extremities  Respiratory:Not usingaccessory muscles. Clear to auscultation bilaterally, no wheeze or crackles. GI: Abdomen soft, no tenderness, not distended, normal bowel sounds  Musculoskeletal: Normal ROM, No cyanosis in digits. Neurology: CN 2-12 grossly intact. No speech or motor deficits  Psych: Normal affect. Alert and oriented in time, place and person  Skin: Warm, dry, normal turgor      Labs: For convenience and continuity at follow-up the following most recent labs are provided:      CBC:    Lab Results   Component Value Date    WBC 4.9 05/27/2022    HGB 11.1 05/27/2022    HCT 34.1 05/27/2022     05/27/2022       Renal:    Lab Results   Component Value Date     05/28/2022    K 4.6 05/28/2022    K 4.0 04/22/2022     05/28/2022    CO2 21 05/28/2022    BUN 33 05/28/2022    CREATININE 0.9 05/28/2022    CALCIUM 8.6 05/28/2022    PHOS 2.5 05/28/2022         Significant Diagnostic Studies    Radiology:   XR CHEST PORTABLE   Final Result   1. Suspected right upper lobe pneumonia. Radiographic follow-up recommended   to ensure resolution. 2. Mild cardiomegaly, stable. New right-sided port with distal tip at the   cavoatrial junction. Consults:     IP CONSULT TO NEPHROLOGY  IP CONSULT TO ONCOLOGY  IP CONSULT TO HOME CARE NEEDS    Disposition: SNF    Condition at Discharge: Stable     Discharge Instructions/Follow-up:   Follow up with your PCP within 7-10 days of discharge. Follow up with oncology as instructed   Take all your medications as prescribed.       Discharge Medications:     Discharge Medication List as of 5/28/2022 10:50 AM           Details   loperamide (IMODIUM) 2 MG capsule Take 1 capsule by mouth 4 times daily as needed for Diarrhea, Disp-12 capsule, R-0NO PRINT              Details   doxycycline hyclate (VIBRA-TABS) 100 MG tablet Take 1 tablet by mouth every 12 hours for 1 dose, Disp-1 tablet, R-0NO PRINT              Details   oxyCODONE-acetaminophen (PERCOCET)  MG per tablet Take 1 tablet by mouth every 8 hours as needed for Pain. Historical Med      apixaban (ELIQUIS) 5 MG TABS tablet Take 1 tablet by mouth 2 times daily, Disp-60 tablet, R-1Normal      ferrous sulfate (IRON 325) 325 (65 Fe) MG tablet Take 325 mg by mouth daily (with breakfast) Historical Med      Cholecalciferol (VITAMIN D3) 50 MCG (2000 UT) CAPS Take by mouthHistorical Med      Cholecalciferol (VITAMIN D3) 125 MCG (5000 UT) TABS Take by mouthHistorical Med      pantoprazole (PROTONIX) 40 MG tablet Take 1 tablet by mouth daily, Disp-30 tablet, R-0Normal      loratadine (CLARITIN) 10 MG tablet Take 10 mg by mouth as needed Historical Med      ondansetron (ZOFRAN) 4 MG tablet Take 1 tablet by mouth every 4 hours as needed for Nausea or Vomiting, Disp-15 tablet, R-0Normal      atenolol (TENORMIN) 25 MG tablet Take 50 mg by mouth daily Historical Med      ALPHAGAN P 0.1 % SOLN Place 1 drop into the left eye 3 times daily , DAWHistorical Med      dorzolamide (TRUSOPT) 2 % ophthalmic solution Place 1 drop into the right eye 3 times daily Historical Med      lisinopril (PRINIVIL;ZESTRIL) 5 MG tablet Take 5 mg by mouth daily Historical Med      simvastatin (ZOCOR) 40 MG tablet Take 40 mg by mouth nightly Historical Med      zolpidem (AMBIEN) 5 MG tablet 2.5 mg. Historical Med               The patient was seen and examined on day of discharge and this discharge summary is in conjunction with any daily progress note from day of discharge. Time Spent on discharge is 45 minutes  in the examination, evaluation, counseling and review of medications and discharge plan.       Note that greater  than 30 minutes was spent in preparing discharge papers, discussing discharge with patient, medication review, etc.     Signed:    Rachel Cristina MD   5/28/2022      Thank you Luis Enrique Dewey MD for the opportunity to be involved in this patient's care. If you have any questions or concerns please feel free to contact me at [phone].

## 2022-05-29 NOTE — PROGRESS NOTES
MD Janell Patel MD Arlice Slice, MD               Office: [phone]                      Fax: [phone]             [address] [place] [address]                   NEPHROLOGY INPATIENT PROGRESS NOTE:     PATIENT NAME: Rylie Morel  : 1940  MRN: [id number]         Treatment plan update    Hyponatremia-improved to baseline-repeat sodium is 136. Acute kidney injury-improving-repeat creatinine 0.9. Rest of the electrolytes are stable. Volume status improving. Medications reviewed. Stable from renal for discharge                   Admitted for:  Sepsis ([facility] [address] [A41.9]  Pneumonia of right upper lobe due to infectious organism [J18.9]  COVID-19 [U07.1]        - BL Na -low 130s   -: Etiology - likely hypovolemia + underlying mild SIADH type effect w/ PNA , COVID-19 w/ hypotension    Associated problems:   - Volume status: hypo-volemic  : BP: hypotension  - renal fx WNL  - Electrolytes: hypokalemia   Hypophosphatemia     - Acid-Base: acidosis - high AGMA w/ lactic acidosis   - Anemia: of chronic disease - mild        Other major problems: Management per primary and other consulting teams.   //   Hospital Problems           Last Modified POA    * (Principal) Sepsis ([facility] [address] 2022 Yes             Please refer to the orders. High Complexity. Multiple complex problems. Discussed with patient and treatment team-    Time spent > 30 (~35) minutes. Thank you for allowing me to participate in this patient's care. Please do not hesitate to contact me anytime. We will follow along with you.        Hilaria Hinton MD,  [facility] [address] [place] [address] [place]: [phone] or [address]  Fax: [phone]        =======================================================================================   =======================================================================================  SUBJECTIVE  Seen resting in bed   No active complaints   Remains on room air, low BP is better  No distress  Labs noted     Past medical, Surgical, Social, Family medical history reviewed by me. MEDICATIONS: reviewed by me. Medications Prior to Admission:  No current facility-administered medications on file prior to encounter. Current Outpatient Medications on File Prior to Encounter   Medication Sig Dispense Refill    apixaban (ELIQUIS) 5 MG TABS tablet Take 1 tablet by mouth 2 times daily 60 tablet 1    ferrous sulfate (IRON 325) 325 (65 Fe) MG tablet Take 325 mg by mouth daily (with breakfast)       Cholecalciferol (VITAMIN D3) 50 MCG (2000 UT) CAPS Take by mouth      Cholecalciferol (VITAMIN D3) 125 MCG (5000 UT) TABS Take by mouth      pantoprazole (PROTONIX) 40 MG tablet Take 1 tablet by mouth daily 30 tablet 0    loratadine (CLARITIN) 10 MG tablet Take 10 mg by mouth as needed       ondansetron (ZOFRAN) 4 MG tablet Take 1 tablet by mouth every 4 hours as needed for Nausea or Vomiting 15 tablet 0    atenolol (TENORMIN) 25 MG tablet Take 50 mg by mouth daily       ALPHAGAN P 0.1 % SOLN Place 1 drop into the left eye 3 times daily       dorzolamide (TRUSOPT) 2 % ophthalmic solution Place 1 drop into the right eye 3 times daily       lisinopril (PRINIVIL;ZESTRIL) 5 MG tablet Take 5 mg by mouth daily       simvastatin (ZOCOR) 40 MG tablet Take 40 mg by mouth nightly       zolpidem (AMBIEN) 5 MG tablet 2.5 mg. No current facility-administered medications for this encounter. Current Outpatient Medications:     loperamide (IMODIUM) 2 MG capsule, Take 1 capsule by mouth 4 times daily as needed for Diarrhea, Disp: 12 capsule, Rfl: 0    oxyCODONE-acetaminophen (PERCOCET)  MG per tablet, Take 1 tablet by mouth every 8 hours as needed for Pain for up to 3 days. , Disp: 9 tablet, Rfl: 0    doxycycline hyclate (VIBRA-TABS) 100 MG tablet, Take 1 tablet by mouth every 12 hours for 1 dose, Disp: 1 tablet, Rfl: 0    apixaban (ELIQUIS) 5 MG TABS tablet, Take 1 tablet by mouth 2 times daily, Disp: 60 tablet, Rfl: 1    ferrous sulfate (IRON 325) 325 (65 Fe) MG tablet, Take 325 mg by mouth daily (with breakfast) , Disp: , Rfl:     Cholecalciferol (VITAMIN D3) 50 MCG (2000 UT) CAPS, Take by mouth, Disp: , Rfl:     Cholecalciferol (VITAMIN D3) 125 MCG (5000 UT) TABS, Take by mouth, Disp: , Rfl:     pantoprazole (PROTONIX) 40 MG tablet, Take 1 tablet by mouth daily, Disp: 30 tablet, Rfl: 0    loratadine (CLARITIN) 10 MG tablet, Take 10 mg by mouth as needed , Disp: , Rfl:     ondansetron (ZOFRAN) 4 MG tablet, Take 1 tablet by mouth every 4 hours as needed for Nausea or Vomiting, Disp: 15 tablet, Rfl: 0    atenolol (TENORMIN) 25 MG tablet, Take 50 mg by mouth daily , Disp: , Rfl:     ALPHAGAN P 0.1 % SOLN, Place 1 drop into the left eye 3 times daily , Disp: , Rfl:     dorzolamide (TRUSOPT) 2 % ophthalmic solution, Place 1 drop into the right eye 3 times daily , Disp: , Rfl:     lisinopril (PRINIVIL;ZESTRIL) 5 MG tablet, Take 5 mg by mouth daily , Disp: , Rfl:     simvastatin (ZOCOR) 40 MG tablet, Take 40 mg by mouth nightly , Disp: , Rfl:     zolpidem (AMBIEN) 5 MG tablet, 2.5 mg. , Disp: , Rfl:        =======================================================================================     PHYSICAL EXAM:  Recent vital signs and recent I/Os reviewed by me. Wt Readings from Last 3 Encounters:   05/28/22 134 lb 3.2 oz (60.9 kg)   04/15/22 124 lb 11.2 oz (56.6 kg)   02/15/22 121 lb 4.1 oz (55 kg)     BP Readings from Last 3 Encounters:   05/28/22 (!) 107/90   04/22/22 121/77   03/02/22 (!) 144/71     No data found. No intake or output data in the 24 hours ending 05/29/22 1209      Physical Exam  Vitals reviewed. Constitutional:    Remains on room air     General: He is not in acute distress. Appearance: Normal appearance. He is ill-appearing. HENT:      Head: Normocephalic and atraumatic.       Right Ear: External ear normal.      Left Ear: External ear normal.      Nose: Nose normal.      Mouth/Throat:      Mouth: Mucous membranes are moist. Mucous membranes are not dry. Eyes:      General: No scleral icterus. Conjunctiva/sclera: Conjunctivae normal.   Neck:      Vascular: No JVD. Cardiovascular:      Rate and Rhythm: Normal rate and regular rhythm. Heart sounds: S1 normal and S2 normal.   Pulmonary:      Effort: Pulmonary effort is normal. No respiratory distress. Breath sounds: Rhonchi present. Abdominal:      General: Bowel sounds are normal. There is no distension. Musculoskeletal:         General: No swelling or deformity. Cervical back: Normal range of motion and neck supple. Skin:     General: Skin is dry. Coloration: Skin is not jaundiced. Neurological:      Mental Status: He is alert and oriented to person, place, and time. Mental status is at baseline. Psychiatric:         Mood and Affect: Mood normal.         Behavior: Behavior normal.          =======================================================================================     DATA:  Diagnostic tests reviewed by me for today's visit:   (AS NEEDED FOR MY EVALUATION AND MANAGEMENT). Recent Labs     05/27/22 0321   WBC 4.9   HCT 34.1*        Iron Saturation:  No components found for: PERCENTFE  FERRITIN:    Lab Results   Component Value Date    FERRITIN 29.2 02/01/2022     IRON:    Lab Results   Component Value Date    IRON 27 02/01/2022     TIBC:    Lab Results   Component Value Date    TIBC 267 02/01/2022       Recent Labs     05/27/22 0321 05/28/22  0256    136   K 4.9 4.6    106   CO2 22 21   BUN 28* 33*   CREATININE 0.9 0.9     Recent Labs     05/27/22 0321 05/28/22  0256   CALCIUM 8.7 8.6   PHOS 2.8 2.5     No results for input(s): PH, PCO2, PO2 in the last 72 hours.     Invalid input(s): [id number], INSPIREDO2    ABG:  No results found for: PH, PCO2, PO2, HCO3, BE, THGB, TCO2, O2SAT  VBG:  No results found for: Shasta Rasmussen, BEVEN, [id number]    LDH:    Lab Results   Component Value Date     02/02/2022     Uric Acid:    Lab Results   Component Value Date    LABURIC 3.6 05/22/2022       PT/INR:    Lab Results   Component Value Date    PROTIME 22.6 05/22/2022    INR 1.95 05/22/2022     Warfarin PT/INR:  No components found for: [employer]  PTT:    Lab Results   Component Value Date    APTT 38.4 05/22/2022   [APTT}  Last 3 Troponin:    Lab Results   Component Value Date    TROPONINI 0.02 05/22/2022    TROPONINI 0.03 02/01/2022    TROPONINI <0.01 04/28/2021       U/A:    Lab Results   Component Value Date    COLORU Yellow 05/24/2022    PROTEINU TRACE 05/24/2022    PHUR 6.5 05/24/2022    WBCUA 1 05/24/2022    RBCUA 1 05/24/2022    BACTERIA None Seen 05/24/2022    CLARITYU Clear 05/24/2022    SPECGRAV 1.015 05/24/2022    LEUKOCYTESUR Negative 05/24/2022    UROBILINOGEN 1.0 05/24/2022    BILIRUBINUR Negative 05/24/2022    BLOODU Negative 05/24/2022    GLUCOSEU Negative 05/24/2022     Microalbumen/Creatinine ratio:  No components found for: RUCREAT  24 Hour Urine for Protein:  No components found for: RAWUPRO, UHRS3, IWUD01SL, UTV3  24 Hour Urine for Creatinine Clearance:  No components found for: CREAT4, UHRS10, UTV10  Urine Toxicology:  No components found for: IAMMENTA, IBARBIT, IBENZO, ICOCAINE, IMARTHC, IOPIATES, IPHENCYC    HgBA1c:  No results found for: LABA1C  RPR:  No results found for: RPR  HIV:  No results found for: HIV  PORTER:  No results found for: ANATITER, PORTER  RF:  No results found for: RF  DSDNA:  No components found for: DNA  AMYLASE:    Lab Results   Component Value Date    AMYLASE 168 01/17/2018     LIPASE:    Lab Results   Component Value Date    LIPASE 24.0 05/22/2022     Fibrinogen Level:  No components found for: FIB       BELOW MENTIONED RADIOLOGY STUDY RESULTS BY ME (AS NEEDED FOR MY EVALUATION AND MANAGEMENT).      XR CHEST PORTABLE    Result Date: 5/22/2022  EXAMINATION: ONE XRAY VIEW OF THE CHEST 5/22/2022 1:16 pm COMPARISON: February 1, 2022 HISTORY: ORDERING SYSTEM PROVIDED HISTORY: SOB TECHNOLOGIST PROVIDED HISTORY: Reason for exam:->SOB Reason for Exam: Fatigue ([facility] from home d/t weakness and frequent falls x 2 days. per EMS, also states some confusion. ) FINDINGS: The cardiomediastinal silhouette is mildly enlarged, stable. There is a right-sided port with distal tip at the cavoatrial junction. Patchy airspace disease involving the parahilar region of the right upper lobe is concerning for pneumonia. Left lung is clear. No pleural effusion or pneumothorax. 1. Suspected right upper lobe pneumonia. Radiographic follow-up recommended to ensure resolution. 2. Mild cardiomegaly, stable. New right-sided port with distal tip at the cavoatrial junction. CT CHEST ABDOMEN PELVIS W CONTRAST    Result Date: 5/19/2022  EXAM: CT CHEST ABDOMEN AND PELVIS INDICATION: Restaging. 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 COMPARISON: CT of the chest, abdomen, and pelvis 2/1/2022 and MRI of the right hip 3/31/2022. TECHNIQUE: Axial CT imaging obtained through the chest, abdomen and pelvis. Axial images and multiplanar reformatted images were reviewed. Up-to-date CT equipment and radiation dose reduction techniques were employed. IV Contrast: 80 mL Isovue-370. Oral Contrast: Yes. CT CHEST: Lungs and Pleura: Left upper lung pulmonary nodule axial image 18 has decreased in size compared to prior exam. Right upper lobe nodule axial image 26 has markedly decreased in size. Bilobed nodule in the anterior left upper lobe axial image 28 appears unchanged. Dominant nodule in the right lower lobe superior segment has nearly completely resolved. The central nodule along the posterior aspect of the inferior left hilum has significantly decreased in size.  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 Representative nodule which is new and image 58 of series 601 measures 10 mm. Multiple nodules in the left lung base have markedly decreased in size. Representative nodule in the left lower lobe image 87 measures 13 x 15 mm, previously measuring 16 x 15 mm. No pleural effusion. Mediastinum: Markedly improved nodular thickening along the esophagus. A subcarinal lymph node and gastroesophageal lymph nodes are markedly decreased in size. Lymph node on image 58 in the inferior mediastinum measures 16 x 11 mm, previously this measured 33 x 19 mm. Left paratracheal and right paratracheal lymph nodes decreased in size compared to prior exam. Lower Neck and Chest Wall: No axillary lymphadenopathy. No supraclavicular lymphadenopathy. Thyroid gland is unremarkable. Bones: No suspicious osseous lesion identified. CT ABDOMEN AND PELVIS: Upper Abdomen: Numerous hepatic metastasis which appear less distinct on the current exam. Lesion in the right lobe of liver image 95 measures 23 x 20 mm, present measuring 30 x 22 mm. Lesion in the left lobe of liver image 98 measures 26 x 19 mm, previously measuring 29 x 19 mm. Lesion in the inferior right lobe liver image 103 measures 24 x 21 mm, previously measuring 19 x 17 mm. No definite new liver lesion clearly identified. Spleen is unchanged. Pancreas is unremarkable. Mixed response of upper abdominal lymphadenopathy. There is a retrocaval lymph node axial image 117 which has increased in size showing 31 x 15 mm, previously measuring 30 x 9 mm. Aortocaval lymph node axial image 117 measures 20 x 14 mm, previously measuring 23 x 16 mm. Previous portal caval lymph node is no longer clearly measurable. A left paraceliac lymph node has decreased in size image 112 measuring 10 x 12 mm, previously measuring 20 x 28 mm. Additional retroperitoneal lymph nodes appear similar in size or slightly decreased. No pancreatic abnormality. Retroperitoneum: No hydronephrosis.  Stable adrenal glands. Bowel and Peritoneum: Nonobstructive bowel gas pattern. No free air. No significant free fluid. Extensive sigmoid diverticulosis. Extensive vascular calcifications of the aorta. Pelvis: No pelvic lymphadenopathy identified. Bladder is unremarkable. Prostate is mildly enlarged with calcifications. Bones: The lytic lesion in the right acetabulum is difficult to visualize. There is a small incomplete fracture along the roof of the acetabulum suggesting a pathologic fracture. Clinical correlation recommended. No new osseous lesion clearly identified. CHEST: 1. 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 2. Numerous previously identified nodules in the lungs bilaterally have decreased in size in the interval. Some of the nodules are no longer clearly visualized. The mediastinal lymphadenopathy has markedly decreased in the interval compatible with response to therapy. No progressive lymphadenopathy identified. Overall mixed response with new nodularity in the superior segment left lower lobe. 3. Marked improvement in nodular thickening of the mid and lower esophagus compared to previous exam. ABDOMEN/PELVIS: 1. New incomplete pathologic fracture along the roof of the right acetabulum. Correlate clinically. 2. Extensive hepatic metastatic disease with many of the nodules measuring stable or slightly decreased in size. A few nodules measure slightly larger. 3. Next response of upper abdominal lymphadenopathy with a few lymph nodes measuring slightly larger and others measuring decreased in size in comparison to prior exam. 4. Extensive diverticulosis without diverticulitis. 5. No new osseous lesion identified.

## 2022-06-08 ENCOUNTER — HOSPITAL ENCOUNTER (INPATIENT)
Age: 82
LOS: 1 days | Discharge: HOSPICE | DRG: 378 | End: 2022-06-09
Attending: EMERGENCY MEDICINE | Admitting: INTERNAL MEDICINE
Payer: MEDICARE

## 2022-06-08 DIAGNOSIS — K92.2 UPPER GI BLEED: ICD-10-CM

## 2022-06-08 DIAGNOSIS — D62 ACUTE BLOOD LOSS ANEMIA: Primary | ICD-10-CM

## 2022-06-08 LAB — OCCULT BLOOD DIAGNOSTIC: ABNORMAL

## 2022-06-08 PROCEDURE — 86901 BLOOD TYPING SEROLOGIC RH(D): CPT

## 2022-06-08 PROCEDURE — 36415 COLL VENOUS BLD VENIPUNCTURE: CPT

## 2022-06-08 PROCEDURE — 86900 BLOOD TYPING SEROLOGIC ABO: CPT

## 2022-06-08 PROCEDURE — 85025 COMPLETE CBC W/AUTO DIFF WBC: CPT

## 2022-06-08 PROCEDURE — 82270 OCCULT BLOOD FECES: CPT

## 2022-06-08 PROCEDURE — 80053 COMPREHEN METABOLIC PANEL: CPT

## 2022-06-08 PROCEDURE — 86850 RBC ANTIBODY SCREEN: CPT

## 2022-06-08 PROCEDURE — P9016 RBC LEUKOCYTES REDUCED: HCPCS

## 2022-06-08 PROCEDURE — 86923 COMPATIBILITY TEST ELECTRIC: CPT

## 2022-06-08 PROCEDURE — 99285 EMERGENCY DEPT VISIT HI MDM: CPT

## 2022-06-09 VITALS
SYSTOLIC BLOOD PRESSURE: 86 MMHG | TEMPERATURE: 97.8 F | OXYGEN SATURATION: 97 % | WEIGHT: 126 LBS | BODY MASS INDEX: 18.04 KG/M2 | DIASTOLIC BLOOD PRESSURE: 55 MMHG | HEART RATE: 84 BPM | HEIGHT: 70 IN | RESPIRATION RATE: 16 BRPM

## 2022-06-09 LAB
A/G RATIO: 1.4 (ref 1.1–2.2)
ABO/RH: NORMAL
ALBUMIN SERPL-MCNC: 3 G/DL (ref 3.4–5)
ALP BLD-CCNC: 109 U/L (ref 40–129)
ALT SERPL-CCNC: 12 U/L (ref 10–40)
ANION GAP SERPL CALCULATED.3IONS-SCNC: 9 MMOL/L (ref 3–16)
ANTIBODY SCREEN: NORMAL
AST SERPL-CCNC: 28 U/L (ref 15–37)
BASOPHILS ABSOLUTE: 0 K/UL (ref 0–0.2)
BASOPHILS RELATIVE PERCENT: 0.6 %
BILIRUB SERPL-MCNC: 0.5 MG/DL (ref 0–1)
BLOOD BANK DISPENSE STATUS: NORMAL
BLOOD BANK PRODUCT CODE: NORMAL
BPU ID: NORMAL
BUN BLDV-MCNC: 51 MG/DL (ref 7–20)
CALCIUM SERPL-MCNC: 8.5 MG/DL (ref 8.3–10.6)
CHLORIDE BLD-SCNC: 109 MMOL/L (ref 99–110)
CO2: 19 MMOL/L (ref 21–32)
CREAT SERPL-MCNC: 0.9 MG/DL (ref 0.8–1.3)
DESCRIPTION BLOOD BANK: NORMAL
EOSINOPHILS ABSOLUTE: 0 K/UL (ref 0–0.6)
EOSINOPHILS RELATIVE PERCENT: 0.5 %
GFR AFRICAN AMERICAN: >60
GFR NON-AFRICAN AMERICAN: >60
GLUCOSE BLD-MCNC: 111 MG/DL (ref 70–99)
HCT VFR BLD CALC: 18.3 % (ref 40.5–52.5)
HCT VFR BLD CALC: 20.1 % (ref 40.5–52.5)
HEMOGLOBIN: 6 G/DL (ref 13.5–17.5)
HEMOGLOBIN: 6.3 G/DL (ref 13.5–17.5)
INR BLD: 1.91 (ref 0.87–1.14)
LYMPHOCYTES ABSOLUTE: 1.2 K/UL (ref 1–5.1)
LYMPHOCYTES RELATIVE PERCENT: 25.1 %
MCH RBC QN AUTO: 32.1 PG (ref 26–34)
MCHC RBC AUTO-ENTMCNC: 31.5 G/DL (ref 31–36)
MCV RBC AUTO: 101.9 FL (ref 80–100)
MONOCYTES ABSOLUTE: 0.5 K/UL (ref 0–1.3)
MONOCYTES RELATIVE PERCENT: 11.2 %
NEUTROPHILS ABSOLUTE: 3 K/UL (ref 1.7–7.7)
NEUTROPHILS RELATIVE PERCENT: 62.6 %
PDW BLD-RTO: 21.5 % (ref 12.4–15.4)
PLATELET # BLD: 205 K/UL (ref 135–450)
PMV BLD AUTO: 7 FL (ref 5–10.5)
POTASSIUM REFLEX MAGNESIUM: 4.1 MMOL/L (ref 3.5–5.1)
PROTHROMBIN TIME: 21.9 SEC (ref 11.7–14.5)
RBC # BLD: 1.98 M/UL (ref 4.2–5.9)
SODIUM BLD-SCNC: 137 MMOL/L (ref 136–145)
TOTAL PROTEIN: 5.2 G/DL (ref 6.4–8.2)
WBC # BLD: 4.7 K/UL (ref 4–11)

## 2022-06-09 PROCEDURE — 2580000003 HC RX 258: Performed by: EMERGENCY MEDICINE

## 2022-06-09 PROCEDURE — 2580000003 HC RX 258: Performed by: NURSE PRACTITIONER

## 2022-06-09 PROCEDURE — C9113 INJ PANTOPRAZOLE SODIUM, VIA: HCPCS | Performed by: EMERGENCY MEDICINE

## 2022-06-09 PROCEDURE — 6360000002 HC RX W HCPCS: Performed by: EMERGENCY MEDICINE

## 2022-06-09 PROCEDURE — P9016 RBC LEUKOCYTES REDUCED: HCPCS

## 2022-06-09 PROCEDURE — 6370000000 HC RX 637 (ALT 250 FOR IP): Performed by: INTERNAL MEDICINE

## 2022-06-09 PROCEDURE — 6370000000 HC RX 637 (ALT 250 FOR IP): Performed by: NURSE PRACTITIONER

## 2022-06-09 PROCEDURE — 85610 PROTHROMBIN TIME: CPT

## 2022-06-09 PROCEDURE — 85018 HEMOGLOBIN: CPT

## 2022-06-09 PROCEDURE — 85014 HEMATOCRIT: CPT

## 2022-06-09 PROCEDURE — 36430 TRANSFUSION BLD/BLD COMPNT: CPT

## 2022-06-09 PROCEDURE — 1200000000 HC SEMI PRIVATE

## 2022-06-09 RX ORDER — POLYETHYLENE GLYCOL 3350 17 G/17G
17 POWDER, FOR SOLUTION ORAL DAILY PRN
Status: DISCONTINUED | OUTPATIENT
Start: 2022-06-09 | End: 2022-06-09 | Stop reason: HOSPADM

## 2022-06-09 RX ORDER — SODIUM CHLORIDE 9 MG/ML
INJECTION, SOLUTION INTRAVENOUS PRN
Status: DISCONTINUED | OUTPATIENT
Start: 2022-06-09 | End: 2022-06-09

## 2022-06-09 RX ORDER — ONDANSETRON 2 MG/ML
4 INJECTION INTRAMUSCULAR; INTRAVENOUS EVERY 6 HOURS PRN
Status: DISCONTINUED | OUTPATIENT
Start: 2022-06-09 | End: 2022-06-09 | Stop reason: HOSPADM

## 2022-06-09 RX ORDER — SODIUM CHLORIDE 0.9 % (FLUSH) 0.9 %
5-40 SYRINGE (ML) INJECTION PRN
Status: DISCONTINUED | OUTPATIENT
Start: 2022-06-09 | End: 2022-06-09 | Stop reason: HOSPADM

## 2022-06-09 RX ORDER — ACETAMINOPHEN 325 MG/1
650 TABLET ORAL EVERY 6 HOURS PRN
Status: DISCONTINUED | OUTPATIENT
Start: 2022-06-09 | End: 2022-06-09 | Stop reason: HOSPADM

## 2022-06-09 RX ORDER — SODIUM CHLORIDE 0.9 % (FLUSH) 0.9 %
5-40 SYRINGE (ML) INJECTION EVERY 12 HOURS SCHEDULED
Status: DISCONTINUED | OUTPATIENT
Start: 2022-06-09 | End: 2022-06-09 | Stop reason: HOSPADM

## 2022-06-09 RX ORDER — BRIMONIDINE TARTRATE 2 MG/ML
1 SOLUTION/ DROPS OPHTHALMIC 2 TIMES DAILY
Status: DISCONTINUED | OUTPATIENT
Start: 2022-06-09 | End: 2022-06-09 | Stop reason: HOSPADM

## 2022-06-09 RX ORDER — ONDANSETRON 4 MG/1
4 TABLET, ORALLY DISINTEGRATING ORAL EVERY 8 HOURS PRN
Status: DISCONTINUED | OUTPATIENT
Start: 2022-06-09 | End: 2022-06-09 | Stop reason: HOSPADM

## 2022-06-09 RX ORDER — HYDROMORPHONE HYDROCHLORIDE 1 MG/ML
0.25 INJECTION, SOLUTION INTRAMUSCULAR; INTRAVENOUS; SUBCUTANEOUS
Status: DISCONTINUED | OUTPATIENT
Start: 2022-06-09 | End: 2022-06-09 | Stop reason: HOSPADM

## 2022-06-09 RX ORDER — SODIUM CHLORIDE, SODIUM LACTATE, POTASSIUM CHLORIDE, CALCIUM CHLORIDE 600; 310; 30; 20 MG/100ML; MG/100ML; MG/100ML; MG/100ML
INJECTION, SOLUTION INTRAVENOUS CONTINUOUS
Status: DISCONTINUED | OUTPATIENT
Start: 2022-06-09 | End: 2022-06-09

## 2022-06-09 RX ORDER — LORAZEPAM 2 MG/ML
1 INJECTION INTRAMUSCULAR EVERY 6 HOURS PRN
Status: DISCONTINUED | OUTPATIENT
Start: 2022-06-09 | End: 2022-06-09 | Stop reason: HOSPADM

## 2022-06-09 RX ORDER — ACETAMINOPHEN 650 MG/1
650 SUPPOSITORY RECTAL EVERY 6 HOURS PRN
Status: DISCONTINUED | OUTPATIENT
Start: 2022-06-09 | End: 2022-06-09 | Stop reason: HOSPADM

## 2022-06-09 RX ORDER — HYDROMORPHONE HYDROCHLORIDE 1 MG/ML
0.5 INJECTION, SOLUTION INTRAMUSCULAR; INTRAVENOUS; SUBCUTANEOUS
Status: DISCONTINUED | OUTPATIENT
Start: 2022-06-09 | End: 2022-06-09 | Stop reason: HOSPADM

## 2022-06-09 RX ORDER — DORZOLAMIDE HCL 20 MG/ML
1 SOLUTION/ DROPS OPHTHALMIC 3 TIMES DAILY
Status: DISCONTINUED | OUTPATIENT
Start: 2022-06-09 | End: 2022-06-09 | Stop reason: HOSPADM

## 2022-06-09 RX ORDER — SCOLOPAMINE TRANSDERMAL SYSTEM 1 MG/1
1 PATCH, EXTENDED RELEASE TRANSDERMAL
Status: DISCONTINUED | OUTPATIENT
Start: 2022-06-09 | End: 2022-06-09 | Stop reason: HOSPADM

## 2022-06-09 RX ORDER — SODIUM CHLORIDE 9 MG/ML
INJECTION, SOLUTION INTRAVENOUS PRN
Status: DISCONTINUED | OUTPATIENT
Start: 2022-06-09 | End: 2022-06-09 | Stop reason: HOSPADM

## 2022-06-09 RX ADMIN — SODIUM CHLORIDE 8 MG/HR: 9 INJECTION, SOLUTION INTRAVENOUS at 04:26

## 2022-06-09 RX ADMIN — DORZOLAMIDE HCL 1 DROP: 20 SOLUTION/ DROPS OPHTHALMIC at 09:26

## 2022-06-09 RX ADMIN — Medication 10 ML: at 09:34

## 2022-06-09 RX ADMIN — SODIUM CHLORIDE, POTASSIUM CHLORIDE, SODIUM LACTATE AND CALCIUM CHLORIDE: 600; 310; 30; 20 INJECTION, SOLUTION INTRAVENOUS at 06:39

## 2022-06-09 RX ADMIN — BRIMONIDINE TARTRATE 1 DROP: 2 SOLUTION/ DROPS OPHTHALMIC at 09:26

## 2022-06-09 RX ADMIN — SODIUM CHLORIDE 80 MG: 9 INJECTION, SOLUTION INTRAVENOUS at 04:00

## 2022-06-09 ASSESSMENT — ENCOUNTER SYMPTOMS: BLOOD IN STOOL: 1

## 2022-06-09 NOTE — H&P
HOSPITALISTS HISTORY AND PHYSICAL    6/9/2022 1:29 AM    Patient Information:  Cristian Allison is a 78 y.o. male [id number]  PCP:  Jacinto Bhatt MD (Tel: [phone] )    Chief complaint:    Chief Complaint   Patient presents with    Other     Abnormal labs- H&H 6.5 / 19.8 brought in by [employer]  from [facility]; mTook insomina meds already tonight. History of Present Illness:  Breezy Yepez is a 78 y.o. male with hx metastatic distal esophageal carcinoma, DVT right leg on Eliquis, A-fib, and HTN. Presents the emergency room from [facility] for anemia with hgb 6.5. Patient received Ambien prior to transfer to the emergency room and remains drowsy only answer simple questions unable to provide any history. Wife and patient's son who is a physician are at bedside. They state patient noticed 3 large black stools on Monday and felt very weak afterwards, labs were drawn but did not result until this evening. He was told he need blood and transferred to ER. Per wife patient did mention that he wanted the blood transfusion. Patient has metastatic esophogeal carcinoma diagnosed in Feb 2022, when he presented with a GI bleed at that time. He has been following with Dr Marlen Phipps and until recently was on chemotherapy. Son states patient became to weak to continue chemotherapy. He has had multiple admissions recently and has progressively declined and losing weight. Son states that they were requesting hospice at the [facility] but had not been set up yet. Both son and wife request to keep patient comfortable. They do not want any invasive testing.    Son states if he would of had opportunity to talk to patient about hgb first, they probably would of opted not to even come to hospital.       History obtained from patient       REVIEW OF SYSTEMS:   Review of Systems   Unable to perform ROS: Other 2.5 mg. Allergies:  No Known Allergies     Social History:  Patient Lives Count includes the [facility]   reports that he quit smoking about 33 years ago. His smoking use included cigarettes. He has a 35.00 pack-year smoking history. He has never used smokeless tobacco. He reports current alcohol use. He reports that he does not use drugs. Family History:  family history includes Cancer in his mother; Dementia in his mother; Stroke in his father. ,     Physical Exam:  BP (!) 102/57   Pulse 94   Temp 97.7 °F (36.5 °C) (Oral)   Resp 17   SpO2 100%   Physical Exam  Vitals and nursing note reviewed. Constitutional:       Appearance: He is ill-appearing. Comments: Patient drowsy  Pale, ill appearing, cachetic    HENT:      Head: Normocephalic. Mouth/Throat:      Mouth: Mucous membranes are dry. Cardiovascular:      Rate and Rhythm: Regular rhythm. Pulses: Normal pulses. Heart sounds: Murmur heard. Pulmonary:      Effort: Pulmonary effort is normal. No respiratory distress. Breath sounds: Normal breath sounds. Abdominal:      General: Abdomen is flat. Bowel sounds are normal. There is no distension. Palpations: Abdomen is soft. Tenderness: There is no abdominal tenderness. Musculoskeletal:         General: Normal range of motion. Cervical back: Normal range of motion. Right lower leg: No edema. Left lower leg: No edema. Skin:     General: Skin is warm and dry. Capillary Refill: Capillary refill takes less than 2 seconds. Coloration: Skin is pale. Findings: No rash. Neurological:      Comments: Patient drowsy, oriented to person and place not time.    Follows simple commands  Unable to provide any meaningful hx         Labs:  CBC:   Lab Results   Component Value Date    WBC 4.7 06/08/2022    RBC 1.98 06/08/2022    HGB 6.3 06/08/2022    HCT 20.1 06/08/2022    .9 06/08/2022    MCH 32.1 06/08/2022    MCHC 31.5 06/08/2022    RDW 21.5 06/08/2022  06/08/2022    MPV 7.0 06/08/2022     BMP:    Lab Results   Component Value Date     06/08/2022    K 4.1 06/08/2022     06/08/2022    CO2 19 06/08/2022    BUN 51 06/08/2022    CREATININE 0.9 06/08/2022    CALCIUM 8.5 06/08/2022    GFRAA >60 06/08/2022    GFRAA >60 04/12/2013    LABGLOM >60 06/08/2022    GLUCOSE 111 06/08/2022     No orders to display     Chest Xray:   EKG:    I visualized CXR images and EKG strips    Problem List  Active Problems:    * No active hospital problems. *  Resolved Problems:    * No resolved hospital problems. *        Assessment/Plan:   1. Acute GI Bleed  Admit inpatient   IV protonix and 1 unit of PRBC  Hold eliquis  Family at bedside, they do not want an EGD done and request he be made comfortable so will hold on GI consult per family wishes. They would like to have hospice consulted,     2. Severe Anemia secondary to Acute Blood Loss   1 unit PRBC  Recheck hgb     3. 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 Smita Read, not currently on chemotherapy     4. Right Lower Extremity DVT  4/12/22 on Eliquis  Hold Eliquis secondary to GI bleed    5. Paroxsymal A-fib    6. Hypertension  Hold BP medications now secondary to GI bleed          DVT prophylaxis no scd has DVT  Code status DNR-CC  Diet NPO  IV access peripheral   Sultana Catheter none    Admit as inpatient. I anticipate hospitalization spanning more than two midnights for investigation and treatment of the above medically necessary diagnoses. Please note that some part of this chart was generated using Dragon dictation software. Although every effort was made to ensure the accuracy of this automated transcription, some errors in transcription may have occurred inadvertently. If you may need any clarification, please do not hesitate to contact me through [facility].        Cuco Clark, APRN - CNP 6/9/2022 1:29 AM

## 2022-06-09 NOTE — ACP (ADVANCE CARE PLANNING)
Advanced Care Planning Note. Purpose of Encounter: Advanced care planning in light of Metastatic Esophageal Cancer  Parties In Attendance: Patient,  Wife and Son, Kevin Duarte NP  Decisional Capacity: patient drowsy from medication, son and wife at bedside, patient has expressed comfort wishes previously   Subjective:family understand that patient continues to deteriorate and no longer want further curative interventions, if there is no long-term benefit. Son states if he would of been able to talk to patient about hgb results first they would of most likely opted to not come to hospital  Objective: hgb 6.3, malnourished, weight loss, multiple recent hospital admissions, chemotherapy has been stopped. Goals of Care Determination: Family wishes to focus on comfort and expressed the desired to pursue hospice.   Plan:  Palliative Care consult and Hospice   Code Status: DNR CC   Time spent on Advanced care [address] [place] [address], KENDELL - CNP  2022 2:04 AM

## 2022-06-09 NOTE — PROGRESS NOTES
Pt son came up and spoke to this RN about the pt and the family's wishes. They want hospice care, which has already being consulted, as well as palliative care and case management. Also requesting his continuous Protonix and IVF to be d/c'd and focus mainly on comfort care. Hospitalist notified. Comfort care orders initiated. Son left to go  pt's wife and call their family, they will come up to the floor to speak with palliative/hospice care.  continuous IVF and Protonix okay to d/c per Hospitalist as per family request.

## 2022-06-09 NOTE — CARE COORDINATION
Discharge Planning:     (MIREYA) attempted to give patient IMM Letter for discharge today, but was informed by [facility] that patient's transportation arrived early an that patient has left already.       Kit DENNY, TINY, [facility] -   [phone]    Electronically signed by EDUIN Roy on 6/9/2022 at 12:42 PM

## 2022-06-09 NOTE — ED PROVIDER NOTES
Emergency Department Provider Note  Location: [address] [place] [address] Emily [facility]  6/8/2022     Patient Identification  Leopoldo Campanile is a 78 y.o. male    Chief Complaint  Other (Abnormal labs- H&H 6.5 / 19.8 brought in by [employer]  from [facility]; mTook insomina meds already tonight.)      Mode of Arrival  EMS    HPI  (History provided by EMS report)  This is a 78 y.o. male with a PMH significant for a-fib on Eliquis, CAD, HTN presented today for abnormal labs. Patient was given his insomnia medication (Ambien) prior to transport to our ED. He was really sleepy in the ED. Unable to provide further details of the history. Unclear why labs were drawn at nursing home. Wife later arrived. She said the patient had 3 large bowel movement on Monday, all of which appear dark. That prompted nursing home to check lab. ROS  Review of Systems   Unable to perform ROS: Other   patient was very sleepy on arrival. He would open his eyes with stimulation but promptly goes back to sleep. 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 Past surgical history:  has a past surgical history that includes Coronary angioplasty with stent; eye surgery; hernia repair; Colonoscopy; ERCP (01/16/2018); Cholecystectomy, laparoscopic (01/17/2018); Upper gastrointestinal endoscopy (N/A, 2/2/2022); Colonoscopy (N/A, 2/2/2022); and IR PORT PLACEMENT > 5 YEARS (2/15/2022).     Home medications:   Prior to Admission medications    Medication Sig Start Date End Date Taking? Authorizing Provider   apixaban (ELIQUIS) 5 MG TABS tablet Take 1 tablet by mouth 2 times daily 4/22/22   Shilpa Everett MD   ferrous sulfate (IRON 325) 325 (65 Fe) MG tablet Take 325 mg by mouth daily (with breakfast)     Historical Provider, MD   Cholecalciferol (VITAMIN D3) 50 MCG (2000 UT) CAPS Take by mouth    Historical Provider, MD   Cholecalciferol (VITAMIN D3) 125 MCG (5000 UT) TABS Take by mouth    Historical Provider, MD   pantoprazole (PROTONIX) 40 MG tablet Take 1 tablet by mouth daily 2/4/22   Shilpa Everett MD   loratadine (CLARITIN) 10 MG tablet Take 10 mg by mouth as needed     Historical Provider, MD   ondansetron (ZOFRAN) 4 MG tablet Take 1 tablet by mouth every 4 hours as needed for Nausea or Vomiting 1/19/18   Lee Gay MD   atenolol (TENORMIN) 25 MG tablet Take 50 mg by mouth daily  10/15/17   Historical Provider, MD   ALPHAGAN P 0.1 % SOLN Place 1 drop into the left eye 3 times daily  11/18/17   Historical Provider, MD   dorzolamide (TRUSOPT) 2 % ophthalmic solution Place 1 drop into the right eye 3 times daily  3/6/16   Historical Provider, MD   lisinopril (PRINIVIL;ZESTRIL) 5 MG tablet Take 5 mg by mouth daily  10/15/17   Historical Provider, MD   simvastatin (ZOCOR) 40 MG tablet Take 40 mg by mouth nightly  6/1/17   Historical Provider, MD   zolpidem (AMBIEN) 5 MG tablet 2.5 mg.  10/25/16   Historical Provider, MD       Social history:  reports that he quit smoking about 33 years ago. His smoking use included cigarettes. He has a 35.00 pack-year smoking history. He has never used smokeless tobacco. He reports current alcohol use. He reports that he does not use drugs.     Family history:    Family History   Problem Relation Age of Onset    Cancer Mother     Dementia Mother     Stroke Father        Exam  ED Triage Vitals [06/08/22 2356]   BP Temp Temp Source Heart Rate Resp SpO2 Height Weight   (!) 111/90 97.9 °F (36.6 °C) Axillary 80 20 95 % -- --   Physical Exam  Vitals and nursing note reviewed. Constitutional:       General: He is not in acute distress. Appearance: He is well-developed. He is not diaphoretic. HENT:      Head: Normocephalic and atraumatic. Eyes:      General:         Right eye: No discharge. Left eye: No discharge. Conjunctiva/sclera: Conjunctivae normal.      Pupils: Pupils are equal, round, and reactive to light. Neck:      Trachea: No tracheal deviation. Cardiovascular:      Rate and Rhythm: Normal rate. Rhythm irregular. Pulmonary:      Effort: Pulmonary effort is normal. No respiratory distress. Breath sounds: Normal breath sounds. No stridor. No wheezing. Abdominal:      General: There is no distension. Palpations: Abdomen is soft. Genitourinary:     Rectum: Normal.      Comments: Dark, tarry stool noted on rectal exam  Musculoskeletal:         General: No deformity. Cervical back: Neck supple. Skin:     General: Skin is warm and dry. Coloration: Skin is pale. Findings: No petechiae. Neurological:      Mental Status: He is lethargic. Motor: No abnormal muscle tone.            MDM/ED Course  ED Medication Orders (From admission, onward)    Start Ordered     Status Ordering Provider    06/09/22 0100 06/09/22 0045  pantoprazole (PROTONIX) 80 mg in sodium chloride 0.9 % 50 mL bolus  ONCE         Acknowledged [facility]    06/09/22 0100 06/09/22 0045  pantoprazole (PROTONIX) 80 mg in sodium chloride 0.9 % 100 mL infusion  CONTINUOUS         Acknowledged [facility]    06/09/22 0044 06/09/22 0045  0.9 % sodium chloride infusion  PRN         Acknowledged [facility]              Labs  Results for orders placed or performed during the hospital encounter of 06/08/22   CBC with Auto Differential   Result Value Ref Range    WBC 4.7 4.0 - 11.0 K/uL    RBC 1.98 (L) 4.20 - 5.90 M/uL    Hemoglobin 6.3 (LL) 13.5 - 17.5 g/dL    Hematocrit 20.1 (LL) 40.5 - 52.5 %    MCV 101.9 (H) 80.0 - 100.0 fL    MCH 32.1 26.0 - 34.0 pg    MCHC 31.5 31.0 - 36.0 g/dL    RDW 21.5 (H) 12.4 - 15.4 %    Platelets 823 196 - 772 K/uL    MPV 7.0 5.0 - 10.5 fL    Neutrophils % 62.6 %    Lymphocytes % 25.1 %    Monocytes % 11.2 %    Eosinophils % 0.5 %    Basophils % 0.6 %    Neutrophils Absolute 3.0 1.7 - 7.7 K/uL    Lymphocytes Absolute 1.2 1.0 - 5.1 K/uL    Monocytes Absolute 0.5 0.0 - 1.3 K/uL    Eosinophils Absolute 0.0 0.0 - 0.6 K/uL    Basophils Absolute 0.0 0.0 - 0.2 K/uL   Comprehensive Metabolic Panel w/ Reflex to MG   Result Value Ref Range    Sodium 137 136 - 145 mmol/L    Potassium reflex Magnesium 4.1 3.5 - 5.1 mmol/L    Chloride 109 99 - 110 mmol/L    CO2 19 (L) 21 - 32 mmol/L    Anion Gap 9 3 - 16    Glucose 111 (H) 70 - 99 mg/dL    BUN 51 (H) 7 - 20 mg/dL    CREATININE 0.9 0.8 - 1.3 mg/dL    GFR Non-African American >60 >60    GFR African American >60 >60    Calcium 8.5 8.3 - 10.6 mg/dL    Total Protein 5.2 (L) 6.4 - 8.2 g/dL    Albumin 3.0 (L) 3.4 - 5.0 g/dL    Albumin/Globulin Ratio 1.4 1.1 - 2.2    Total Bilirubin 0.5 0.0 - 1.0 mg/dL    Alkaline Phosphatase 109 40 - 129 U/L    ALT 12 10 - 40 U/L    AST 28 15 - 37 U/L   Protime-INR   Result Value Ref Range    Protime 21.9 (H) 11.7 - 14.5 sec    INR 1.91 (H) 0.87 - 1.14   Blood occult stool #1   Result Value Ref Range    Occult Blood Diagnostic Result: POSITIVE  Normal range: Negative   (A)    TYPE AND SCREEN   Result Value Ref Range    ABO/Rh O POS     Antibody Screen NEG    PREPARE RBC (CROSSMATCH), 1 Units   Result Value Ref Range    Product Code Blood Bank [id number]     Description Blood Bank Red Blood Cells, Leuko-reduced     Unit Number [id number]     Dispense Status Blood Bank issued          - Patient seen and evaluated in room 6.  78 y.o. male presented for abnormal H/H. Exam showed dark tarry stool, concerning for upper GI bleed. Protonix bolus followed by drip ordered. Stool occult blood positive.   H&H significantly lower than his baseline today. I order a unit of packed red blood cell. Patient is on Eliquis for A. fib. We will need to hold the Eliquis. I spoke with Dr. Jeramie Rangel, hospitalist. We thoroughly discussed the history, physical exam, laboratory and imaging studies, as well as, emergency department course. Based upon that discussion, we've decided to admit Madelyn Robertson for further observation and evaluation of Sandro Ren's upper GI bleed, anemia  As I have deemed necessary from their history, physical and studies, I have considered and evaluated Madelyn Robertson for the following diagnoses:  ACUTE APPENDICITIS, BOWEL OBSTRUCTION, CHOLECYSTITIS, DIVERTICULITIS, INCARCERATED HERNIA, PANCREATITIS, or PERFORATED BOWEL or ULCER. Clinical Impression:  1. Acute blood loss anemia    2. Upper GI bleed          Disposition:  Admit to hospitalist in guarded condition. Total critical care time is 35 minutes, which excludes separately billable procedures and updating family. Time spent is specifically for management of the presenting complaint and symptoms initially, direct bedside care, reevaluation, review of records, and consultation. There was a high probability of clinically significant life-threatening deterioration in the patient's condition, which required my urgent intervention. This chart was generated in part by using Dragon Dictation system and may contain errors related to that system including errors in grammar, punctuation, and spelling, as well as words and phrases that may be inappropriate. If there are any questions or concerns please feel free to contact the dictating provider for clarification.      Iram Ordonez MD  [facility] Jsoe Durand MD  06/09/22 9137

## 2022-06-09 NOTE — CONSULTS
PALLIATIVE MEDICINE CONSULTATION     Patient name:Sandro Squires   HKW:[id number]    :1940  Room[facility]Bed:[facility]3377/3377-01   LOS: 0 days         Date of consult:2022    Consult Information  Palliative Medicine Consult performed by: KENDELL Arita CNP, CNP    Inpatient consult to Palliative Care  Consult performed by: KENDELL Arita CNP  Consult ordered by: KENDELL Ibarra CNP  Reason for consult: [address]            ASSESSMENT/RECOMMENDATIONS     78 y.o. male with dysphagia and acute GI blood loss      Symptom Management:  1. Dysphagia- pt with garbled likely related to esophageal cancer, oriented to self only   2. GI blood loss- pt has alessandra blood in his mouth and on his pillow, Hgb dropped this am to 6.0   3. Goals of Care- pt disoriented and does not appear to have capacity to make decisions on chart review family had made decision last night to focus on comfort care and code status updated to [facility] hospice consult placed. Talked to wife Marilyn Chauhan this am she has chosen [address] and is hopeful that pt can go to inpatient unit in [facility]. Referral faxed to [address] [place] [address] and care team updated. Patient/Family Goals of Care :    pt disoriented and does not appear to have capacity to make decisions on chart review family had made decision last night to focus on comfort care and code status updated to [facility] hospice consult placed. Talked to wife Marilyn Chauhan this am she has chosen [address] and is hopeful that pt can go to inpatient unit in [facility]. Referral faxed to [address] [place] [address] Cir and care team updated. Disposition/Discharge Plan:   Plan to transfer to [place] [address]. meeting with family at [address]:  · Surrogate Decision Maker: Lisa-spouse  · Code status:  DNR-CC    Case discussed with: patient, floor RN, Dr Faustina Garces  Thank you for allowing us to participate in the care of this patient.       HISTORY     CC: dysphagia  HPI: The patient is a 78 y.o. male with hx metastatic distal esophageal carcinoma, DVT right leg on Eliquis, A-fib, and HTN. Presents the emergency room from [facility] for anemia with hgb 6.5. Palliative Medicine SymptomScreening/ROS:    Review of Systems   Unable to perform ROS: Mental status change     Patient unable to complete full ROS due to current cognitive status. Information that is obtained from nursing and chart. Palliative Performance Scale:     [] 60%  Amb reduced; Sig dz. Can't do hobbies/housework; Intake normal or reduced, Occasional assist; LOC full/confusion   [] 50%  Mainly sit/lie; Extensive disease. Mainly assist, Intake normal or reduced; Occasional assist; LOC full/confusion   [x] 40%  Mainly in bed; Extensive disease; Mainly assist; Intake normal or reduced; Occasional assist; LOC full/confusion   [] 30%  Bed bound, Extensive disease; Total care; Intake reduced; LOC full/confusion   [] 20%  Bed bound; Extensive disease; Total care; Intake minimal; Drowsy/coma   [] 10%  Bed bound; Extensive disease; Total care; Mouth care only; Drowsy/coma   []  0%   Death       Home med list and hospital medications reviewed in chart as of 6/9/2022     EXAM     Vitals:    06/09/22 0730   BP: 101/63   Pulse: 82   Resp:    Temp: 97.6 °F (36.4 °C)   SpO2: 98%       Physical Exam  Constitutional:       Appearance: He is ill-appearing. HENT:      Head: Normocephalic and atraumatic. Nose: Nose normal.      Mouth/Throat:      Mouth: Mucous membranes are dry. Comments: Flavio Sane blood in mouth and on face  Eyes:      Pupils: Pupils are equal, round, and reactive to light. Cardiovascular:      Rate and Rhythm: Normal rate. Pulses: Normal pulses. Heart sounds: No murmur heard. No gallop. Pulmonary:      Effort: Pulmonary effort is normal. No respiratory distress. Abdominal:      General: There is distension. Tenderness: There is abdominal tenderness. Musculoskeletal:      Cervical back: Neck supple. Right lower leg: No edema.       Left lower leg: No edema. Skin:     General: Skin is dry. Coloration: Skin is pale. Neurological:      Mental Status: He is alert. Mental status is at baseline. He is disoriented. Motor: Weakness present.                 Current labs in the epic chart reviewed as of 6/9/2022   Review of previous notes, admits, labs, radiology and testing relevant to this consult done in this chart today 6/9/2022      Total time: 70 minutes  >50% of time spent counseling patient at bedside or POA/family member if applicable , reviewing information and discussing care, coordinating with care team  Signed By: Electronically signed by KENDELL Charles CNP on 6/9/2022 at 8:20 AM  Palliative Medicine   [phone]    June 9, 2022

## 2022-06-09 NOTE — ED NOTES
I stayed in room with patient monitoring vitals for the first 15 minutes of the transfusion.  VSS stable and Patient had no adverse reaction     Merline Diez RN  06/09/22 0182

## 2022-06-09 NOTE — DISCHARGE SUMMARY
Pt was alert and oriented, family at bedside. Patient discharged to [address] [place] [address] with transportation. Patient did not have any belongings with him besides the hearing aids in bilateral ears and his prescription glasses.

## 2022-06-09 NOTE — SIGNIFICANT EVENT
Family/patient requested to withdraw care. Hospice already consulted.   Comfort care orders initiated

## 2022-06-10 NOTE — DISCHARGE SUMMARY
Hospital Medicine Discharge Summary    Patient ID: Ramón Griffin      Patient's PCP: Praneeth Brewster MD    Admit Date: 6/8/2022     Discharge Date: 6/9/2022     Admitting Physician: Jesús Fang DO     Discharge Physician: Martinez Riley MD        Active Hospital Problems    Diagnosis     Acute GI bleeding [facility] Course: This 64-year-old male with PMHx of metastatic distal esophageal carcinoma, DVT; on Eliquis, A. fib and hypertension presented with abnormal labs and was noted to have hemoglobin of 6.5. Of note, patient was diagnosed with  metastatic esophageal cancer on February 2022 and was undergoing chemotherapy which was recently discontinued due to patient's progressive decline and inability to tolerate chemotherapy. Patient's son requested for hospice and did not want any interventions or blood transfusion. Palliative care and hospice were consulted and patient transferred to inpatient hospice. Physical Exam Performed:     BP (!) 86/55   Pulse 84   Temp 97.8 °F (36.6 °C) (Oral)   Resp 16   Ht 5' 10\" (1.778 m)   Wt 126 lb (57.2 kg)   SpO2 97%   BMI 18.08 kg/m²     General appearance: Ill-appearing, cachectic, drowsy  Cardiovascular: Regular rhythm, normal S1, S2. No murmur. No edema in lower extremities  Respiratory:Clear to auscultation bilaterally, no wheeze or crackles.    GI: Abdomen soft, no tenderness, not distended, normal bowel sounds  Musculoskeletal: Unable to obtain due to patient factors  Neurology: Drowsy; unable to obtain due to patient factors  Psych: Unable to obtain due to patient factors  Skin: Pale dry    Consults:     IP CONSULT TO PALLIATIVE CARE  IP CONSULT TO HOSPICE  IP CONSULT TO CASE MANAGEMENT    Disposition: Inpatient hospice    Condition at Discharge: Stable        Discharge Medications:     Discharge Medication List as of 6/9/2022 11:37 AM           Details   ferrous sulfate (IRON 325) 325 (65 Fe) MG tablet Take 325 mg by mouth daily (with breakfast) Historical Med      Cholecalciferol (VITAMIN D3) 50 MCG (2000 UT) CAPS Take by mouthHistorical Med      pantoprazole (PROTONIX) 40 MG tablet Take 1 tablet by mouth daily, Disp-30 tablet, R-0Normal      loratadine (CLARITIN) 10 MG tablet Take 10 mg by mouth as needed Historical Med      ondansetron (ZOFRAN) 4 MG tablet Take 1 tablet by mouth every 4 hours as needed for Nausea or Vomiting, Disp-15 tablet, R-0Normal      atenolol (TENORMIN) 25 MG tablet Take 50 mg by mouth daily Historical Med      dorzolamide (TRUSOPT) 2 % ophthalmic solution Place 1 drop into the right eye 3 times daily Historical Med      lisinopril (PRINIVIL;ZESTRIL) 5 MG tablet Take 5 mg by mouth daily Historical Med      zolpidem (AMBIEN) 5 MG tablet 2.5 mg. Historical Med             The patient was seen and examined on day of discharge and this discharge summary is in conjunction with any daily progress note from day of discharge. Time Spent on discharge is 45 minutes  in the examination, evaluation, counseling and review of medications and discharge plan. Note that greater  than 30 minutes was spent in preparing discharge papers, discussing discharge with patient, medication review, etc.     Signed:    Eleno Yeager MD   6/10/2022      Thank you Kiki Ibrahim MD for the opportunity to be involved in this patient's care. If you have any questions or concerns please feel free to contact me at [phone].
